# Patient Record
Sex: FEMALE | Race: WHITE | NOT HISPANIC OR LATINO | Employment: OTHER | URBAN - METROPOLITAN AREA
[De-identification: names, ages, dates, MRNs, and addresses within clinical notes are randomized per-mention and may not be internally consistent; named-entity substitution may affect disease eponyms.]

---

## 2017-01-24 ENCOUNTER — ALLSCRIPTS OFFICE VISIT (OUTPATIENT)
Dept: OTHER | Facility: OTHER | Age: 67
End: 2017-01-24

## 2017-07-19 ENCOUNTER — ALLSCRIPTS OFFICE VISIT (OUTPATIENT)
Dept: OTHER | Facility: OTHER | Age: 67
End: 2017-07-19

## 2017-07-19 DIAGNOSIS — Z13.820 ENCOUNTER FOR SCREENING FOR OSTEOPOROSIS: ICD-10-CM

## 2017-07-19 DIAGNOSIS — J45.909 UNCOMPLICATED ASTHMA: ICD-10-CM

## 2017-07-24 ENCOUNTER — OFFICE VISIT (OUTPATIENT)
Dept: URGENT CARE | Facility: MEDICAL CENTER | Age: 67
End: 2017-07-24
Payer: MEDICARE

## 2017-07-24 PROCEDURE — G0463 HOSPITAL OUTPT CLINIC VISIT: HCPCS

## 2017-07-24 PROCEDURE — 99203 OFFICE O/P NEW LOW 30 MIN: CPT

## 2017-08-02 ENCOUNTER — HOSPITAL ENCOUNTER (OUTPATIENT)
Dept: BONE DENSITY | Facility: MEDICAL CENTER | Age: 67
Discharge: HOME | End: 2017-08-02
Payer: MEDICARE

## 2017-08-02 DIAGNOSIS — Z13.820 ENCOUNTER FOR SCREENING FOR OSTEOPOROSIS: ICD-10-CM

## 2017-08-02 PROCEDURE — 77080 DXA BONE DENSITY AXIAL: CPT

## 2017-08-08 ENCOUNTER — GENERIC CONVERSION - ENCOUNTER (OUTPATIENT)
Dept: OTHER | Facility: OTHER | Age: 67
End: 2017-08-08

## 2017-08-12 LAB
A/G RATIO (HISTORICAL): 1.8 (CALC) (ref 1–2.5)
ALBUMIN SERPL BCP-MCNC: 4.3 G/DL (ref 3.6–5.1)
ALP SERPL-CCNC: 67 U/L (ref 33–130)
ALT SERPL W P-5'-P-CCNC: 16 U/L (ref 6–29)
AST SERPL W P-5'-P-CCNC: 20 U/L (ref 10–35)
BASOPHILS # BLD AUTO: 0.8 %
BASOPHILS # BLD AUTO: 71 CELLS/UL (ref 0–200)
BILIRUB SERPL-MCNC: 0.4 MG/DL (ref 0.2–1.2)
BUN SERPL-MCNC: 10 MG/DL (ref 7–25)
BUN/CREA RATIO (HISTORICAL): NORMAL (CALC) (ref 6–22)
CALCIUM SERPL-MCNC: 8.9 MG/DL (ref 8.6–10.4)
CHLORIDE SERPL-SCNC: 105 MMOL/L (ref 98–110)
CO2 SERPL-SCNC: 29 MMOL/L (ref 20–31)
CREAT SERPL-MCNC: 0.78 MG/DL (ref 0.5–0.99)
DEPRECATED RDW RBC AUTO: 12.8 % (ref 11–15)
EGFR AFRICAN AMERICAN (HISTORICAL): 92 ML/MIN/1.73M2
EGFR-AMERICAN CALC (HISTORICAL): 80 ML/MIN/1.73M2
EOSINOPHIL # BLD AUTO: 1.7 %
EOSINOPHIL # BLD AUTO: 151 CELLS/UL (ref 15–500)
GAMMA GLOBULIN (HISTORICAL): 2.4 G/DL (CALC) (ref 1.9–3.7)
GLUCOSE (HISTORICAL): 94 MG/DL (ref 65–99)
HCT VFR BLD AUTO: 45.2 % (ref 35–45)
HGB BLD-MCNC: 15.1 G/DL (ref 11.7–15.5)
LYMPHOCYTES # BLD AUTO: 2278 CELLS/UL (ref 850–3900)
LYMPHOCYTES # BLD AUTO: 25.6 %
MCH RBC QN AUTO: 29.5 PG (ref 27–33)
MCHC RBC AUTO-ENTMCNC: 33.4 G/DL (ref 32–36)
MCV RBC AUTO: 88.3 FL (ref 80–100)
MONOCYTES # BLD AUTO: 703 CELLS/UL (ref 200–950)
MONOCYTES (HISTORICAL): 7.9 %
NEUTROPHILS # BLD AUTO: 5696 CELLS/UL (ref 1500–7800)
NEUTROPHILS # BLD AUTO: 64 %
PLATELET # BLD AUTO: 228 THOUSAND/UL (ref 140–400)
PMV BLD AUTO: 11 FL (ref 7.5–12.5)
POTASSIUM SERPL-SCNC: 4.7 MMOL/L (ref 3.5–5.3)
RBC # BLD AUTO: 5.12 MILLION/UL (ref 3.8–5.1)
SODIUM SERPL-SCNC: 138 MMOL/L (ref 135–146)
TOTAL PROTEIN (HISTORICAL): 6.7 G/DL (ref 6.1–8.1)
WBC # BLD AUTO: 8.9 THOUSAND/UL (ref 3.8–10.8)

## 2017-08-15 ENCOUNTER — GENERIC CONVERSION - ENCOUNTER (OUTPATIENT)
Dept: OTHER | Facility: OTHER | Age: 67
End: 2017-08-15

## 2017-09-11 ENCOUNTER — ALLSCRIPTS OFFICE VISIT (OUTPATIENT)
Dept: OTHER | Facility: OTHER | Age: 67
End: 2017-09-11

## 2017-10-23 ENCOUNTER — LAB REQUISITION (OUTPATIENT)
Dept: LAB | Facility: HOSPITAL | Age: 67
End: 2017-10-23
Payer: MEDICARE

## 2017-10-23 ENCOUNTER — ALLSCRIPTS OFFICE VISIT (OUTPATIENT)
Dept: OTHER | Facility: OTHER | Age: 67
End: 2017-10-23

## 2017-10-23 DIAGNOSIS — Z12.4 ENCOUNTER FOR SCREENING FOR MALIGNANT NEOPLASM OF CERVIX: ICD-10-CM

## 2017-10-23 DIAGNOSIS — Z12.31 ENCOUNTER FOR SCREENING MAMMOGRAM FOR MALIGNANT NEOPLASM OF BREAST: ICD-10-CM

## 2017-10-23 PROCEDURE — G0145 SCR C/V CYTO,THINLAYER,RESCR: HCPCS | Performed by: OBSTETRICS & GYNECOLOGY

## 2017-10-24 NOTE — PROGRESS NOTES
Assessment  1  Encounter for screening mammogram for breast cancer (V76 12) (Z12 31)  2  Encounter for cervical Pap smear with pelvic exam (V76 2,V72 31) (Z01 419)1   3  Encounter for preventive health examination (V70 0) (Z00 00)1      1 Amended By: Payton Galarza; Oct 23 2017 9:37 AM EST    Plan  Cervical cancer screening    · (1) THIN PREP PAP WITH IMAGING; Status:Active - Retrospective By Protocol  Authorization; Requested F:[id number];   Perform:[address]; BMJ:[id number]; Last Updated   Katarina Ricketts; 10/23/2017 9:30:08 AM;Ordered; For:Cervical cancer screening;   Ordered By:Riedel, C William;  PAP Maturation index required? : No  LMP: : menopause  Reflex HPV? : if ASCUS  Encounter for screening mammogram for breast cancer    · MAMMO SCREENING BILATERAL W 3D & CAD; Status:Hold For - Scheduling; Requested  BKY:[id number];   Perform:[facility]; [phone]; Ordered; For:Encounter for screening   mammogram for breast cancer; Ordered By:Riedel, C William;    Discussion/Summary  health maintenance visit healthy adult female Currently, she eats a healthy diet  the risks and benefits of cervical cancer screening were discussed cervical cancer screening is current Pap test was done today Pap test with reflex HPV testing was done today Breast cancer screening: the risks and benefits of breast cancer screening were discussed, self breast exam technique was taught, monthly self breast exam was advised, mammogram is current and mammogram has been ordered  Colorectal cancer screening: the risks and benefits of colorectal cancer screening were discussed and colorectal cancer screening is current  Osteoporosis screening: the risks and benefits of osteoporosis screening were discussed and bone mineral density testing is current  Patient discussion: discussed with the patient, 30 minute visit, greater than half of the time was spent on counseling       As above patient here for annual exam Pap pelvic and breast exam  Her exam today was normal screening mammography ordered  DEXA bone scan had been previously ordered by her PCP  She is scheduled to follow up with her PCP soon to order any additional screening laboratory studies as indicated  Patient is otherwise in good health and has recently retired from her position as a  for over 28 years  Recommend follow-up in 2 years and lyle Peguero Chief Complaint  PT IS HERE FOR HER ANNUAL EXAM, PT NEED A MAMMO SCRIP AND PT WOULD LIKE TO HAVE SOME BLOOD WORK SCRIPT TOO, PT HAD NO COMPLAINTS  History of Present Illness  HPI: 19-year-old female nulliparous here for annual exam Pap pelvic and breast exam  Patient does have occasional hot flashes and night sweats  Is not currently on any hormone replacement therapy  Otherwise she has no gyn complaints or concerns at this time  GYN HM, Adult Female [facility]: The patient is being seen for a health maintenance and gynecology evaluation  The last health maintenance visit was 2016 year(s) ago  General Health: The patient's health since the last visit is described as good  She has regular dental visits  -- She denies vision problems  -- She denies hearing loss  Immunizations status: up to date  Lifestyle:  She consumes a diverse and healthy diet  -- She does not have any weight concerns  -- She exercises regularly  -- She does not use tobacco -- She denies alcohol use  -- She denies drug use  Reproductive health: the patient is postmenopausal    Screening: cancer screening reviewed and current  Review of Systems    Constitutional: No fever, no chills, feels well, no tiredness, no recent weight gain or loss  ENT: no ear ache, no loss of hearing, no nosebleeds or nasal discharge, no sore throat or hoarseness  Cardiovascular: no complaints of slow or fast heart rate, no chest pain, no palpitations, no leg claudication or lower extremity edema     Respiratory: no complaints of shortness of breath, no wheezing, no dyspnea on exertion, no orthopnea or PND  Breasts: no complaints of breast pain, breast lump or nipple discharge  Gastrointestinal: no complaints of abdominal pain, no constipation, no nausea or diarrhea, no vomiting, no bloody stools  Genitourinary: no complaints of dysuria, no incontinence, no pelvic pain, no dysmenorrhea, no vaginal discharge or abnormal vaginal bleeding  Musculoskeletal: no complaints of arthralgia, no myalgia, no joint swelling or stiffness, no limb pain or swelling  Integumentary: no complaints of skin rash or lesion, no itching or dry skin, no skin wounds  Neurological: no complaints of headache, no confusion, no numbness or tingling, no dizziness or fainting  Active Problems  1  Acute maxillary sinusitis (461 0) (J01 00)  2  Allergic rhinitis (477 9) (J30 9)  3  Asthma (493 90) (J45 909)  4  Cellulitis of left lower leg (682 6) (L03 116)  5  Depression screening (V79 0) (Z13 89)  6  Dysfunction of left eustachian tube (381 81) (H69 82)  7  Edema (782 3) (R60 9)  8  Encounter for cervical Pap smear with pelvic exam (V76 2,V72 31) (Z01 419)  9  Encounter for screening colonoscopy (V76 51) (Z12 11)  10  Encounter for screening mammogram for breast cancer (V76 12) (Z12 31)  11  Fatigue (780 79) (R53 83)  12  History of dysthymic disorder (V11 9) (Z86 59)  13  Hyperlipidemia (272 4) (E78 5)  14  Nocturnal leg cramps (327 52) (G47 62)  15  Osteoporosis screening (V82 81) (Z13 820)  16  Screening for other and unspecified genitourinary condition (V81 6) (Z13 89)  17  Special screening for other neurological conditions (V80 09) (Z13 89)  18   Visit for routine gyn exam (V72 31) (Z01 419)    Surgical History   · History of Breast Surgery Lumpectomy   · History of Oral Surgery Tooth Extraction Bearden Tooth   · History of Tonsillectomy    Family History  Mother    · Family history of hypertension (V17 49) (Z82 49)   · Family history of malignant neoplasm of uterus (V16 49) (Z80 49)  Father    · Family history of cardiac disorder (V17 49) (Z82 49)  Brother    · Family history of heart attack (V17 3) (Z82 49)  Grandmother    · Family history of diabetes mellitus (V18 0) (Z83 3)    Social History   · Always uses seat belt   · Caffeine use (V49 89) (F15 90)   · Former smoker (V15 82) (W15 609)   · Single   · Social alcohol use (Z78 9)    Current Meds  1  LevoFLOXacin 500 MG Oral Tablet; Take 1 tablet daily; Therapy: 36Jkk8694 to (Last Rx:91Dph8401)  Requested for: 97Ays9625 Ordered  2  PredniSONE 10 MG Oral Tablet; Take 6 pills x 3 days, 4 pills x 3 days, 2 pills x 3   days, then 1 pill x 3 days; Therapy: 18Zko3308 to (Evaluate:00Qqh9529)  Requested for: 79Isr3683; Last   Rx:30Kqd8655 Ordered  3  Ventolin  (90 Base) MCG/ACT Inhalation Aerosol Solution; INHALE 1 TO 2   PUFFS EVERY 4 TO 6 HOURS AS NEEDED; Therapy: 80GSY9749 to (Last Rx:45Zyz9709)  Requested for: 99LWW3558 Ordered    Allergies  1  Penicillins  2  Animal dander - Cats  3  Animal dander - Dogs  4  Grass  5  Mold  6  Ragweed  7  Soy  8   Wasp    Vitals   Recorded: 61OGL9615 22:29UL   Systolic 592, LUE, Sitting   Diastolic 65, LUE, Sitting   Height 5 ft 4 in   Weight 126 lb    BMI Calculated 21 63   BSA Calculated 1 61   LMP MENOPAUSE     Signatures   Electronically signed by : Jose David Salazar DO; Oct 23 2017  9:37AM EST                       (Author)

## 2017-10-31 LAB
LAB AP GYN PRIMARY INTERPRETATION: NORMAL
Lab: NORMAL

## 2017-12-01 ENCOUNTER — ALLSCRIPTS OFFICE VISIT (OUTPATIENT)
Dept: OTHER | Facility: OTHER | Age: 67
End: 2017-12-01

## 2017-12-01 DIAGNOSIS — J06.9 ACUTE UPPER RESPIRATORY INFECTION: ICD-10-CM

## 2017-12-01 DIAGNOSIS — J45.909 UNCOMPLICATED ASTHMA: ICD-10-CM

## 2017-12-01 DIAGNOSIS — E55.9 VITAMIN D DEFICIENCY: ICD-10-CM

## 2017-12-04 ENCOUNTER — APPOINTMENT (OUTPATIENT)
Dept: LAB | Facility: HOSPITAL | Age: 67
End: 2017-12-04
Payer: MEDICARE

## 2017-12-04 ENCOUNTER — HOSPITAL ENCOUNTER (OUTPATIENT)
Dept: RADIOLOGY | Facility: HOSPITAL | Age: 67
Discharge: HOME | End: 2017-12-04
Payer: MEDICARE

## 2017-12-04 DIAGNOSIS — J45.909 UNCOMPLICATED ASTHMA: ICD-10-CM

## 2017-12-04 DIAGNOSIS — J06.9 ACUTE UPPER RESPIRATORY INFECTION: ICD-10-CM

## 2017-12-04 DIAGNOSIS — E55.9 VITAMIN D DEFICIENCY: ICD-10-CM

## 2017-12-04 LAB — 25(OH)D3 SERPL-MCNC: 22.4 NG/ML (ref 30–100)

## 2017-12-04 PROCEDURE — 71020 HB CHEST X-RAY 2VW FRONTAL&LATL: CPT

## 2017-12-04 PROCEDURE — 82306 VITAMIN D 25 HYDROXY: CPT

## 2017-12-04 PROCEDURE — 36415 COLL VENOUS BLD VENIPUNCTURE: CPT

## 2017-12-05 ENCOUNTER — GENERIC CONVERSION - ENCOUNTER (OUTPATIENT)
Dept: OTHER | Facility: OTHER | Age: 67
End: 2017-12-05

## 2017-12-08 ENCOUNTER — GENERIC CONVERSION - ENCOUNTER (OUTPATIENT)
Dept: OTHER | Facility: OTHER | Age: 67
End: 2017-12-08

## 2017-12-08 PROCEDURE — 88305 TISSUE EXAM BY PATHOLOGIST: CPT | Performed by: FAMILY MEDICINE

## 2017-12-11 ENCOUNTER — LAB REQUISITION (OUTPATIENT)
Dept: LAB | Facility: HOSPITAL | Age: 67
End: 2017-12-11
Payer: MEDICARE

## 2017-12-11 DIAGNOSIS — L98.9 DISORDER OF SKIN OR SUBCUTANEOUS TISSUE: ICD-10-CM

## 2017-12-12 ENCOUNTER — HOSPITAL ENCOUNTER (OUTPATIENT)
Dept: MAMMOGRAPHY | Facility: MEDICAL CENTER | Age: 67
Discharge: HOME | End: 2017-12-12
Payer: MEDICARE

## 2017-12-12 DIAGNOSIS — Z12.31 ENCOUNTER FOR SCREENING MAMMOGRAM FOR MALIGNANT NEOPLASM OF BREAST: ICD-10-CM

## 2017-12-12 PROCEDURE — 77063 BREAST TOMOSYNTHESIS BI: CPT

## 2017-12-12 PROCEDURE — G0202 SCR MAMMO BI INCL CAD: HCPCS

## 2017-12-19 ENCOUNTER — GENERIC CONVERSION - ENCOUNTER (OUTPATIENT)
Dept: OTHER | Facility: OTHER | Age: 67
End: 2017-12-19

## 2018-01-05 ENCOUNTER — GENERIC CONVERSION - ENCOUNTER (OUTPATIENT)
Dept: OTHER | Facility: OTHER | Age: 68
End: 2018-01-05

## 2018-01-12 VITALS
HEIGHT: 64 IN | SYSTOLIC BLOOD PRESSURE: 120 MMHG | DIASTOLIC BLOOD PRESSURE: 80 MMHG | WEIGHT: 124.13 LBS | BODY MASS INDEX: 21.19 KG/M2

## 2018-01-12 NOTE — RESULT NOTES
Verified Results  (1) CBC/PLT/DIFF 80WMP5757 11:26AM Tamie Dee Dee   REPORT COMMENT:  FASTING:YES     Test Name Result Flag Reference   WHITE BLOOD CELL COUNT 8 9 Thousand/uL  3 8-10 8   RED BLOOD CELL COUNT 5 12 Million/uL H 3 80-5 10   HEMOGLOBIN 15 1 g/dL  11 7-15 5   HEMATOCRIT 45 2 % H 35 0-45 0   MCV 88 3 fL  80 0-100 0   MCH 29 5 pg  27 0-33 0   MCHC 33 4 g/dL  32 0-36 0   RDW 12 8 %  11 0-15 0   PLATELET COUNT 794 Thousand/uL  140-400   ABSOLUTE NEUTROPHILS 5696 cells/uL  5431-1498   ABSOLUTE LYMPHOCYTES 2278 cells/uL  850-3900   ABSOLUTE MONOCYTES 703 cells/uL  200-950   ABSOLUTE EOSINOPHILS 151 cells/uL     ABSOLUTE BASOPHILS 71 cells/uL  0-200   NEUTROPHILS 64 %     LYMPHOCYTES 25 6 %     MONOCYTES 7 9 %     EOSINOPHILS 1 7 %     BASOPHILS 0 8 %     MPV 11 0 fL  7 5-12 5     (1) COMPREHENSIVE METABOLIC PANEL 13UWJ7975 22:34WZ Tamie Dee Dee     Test Name Result Flag Reference   GLUCOSE 94 mg/dL  65-99   Fasting reference interval   UREA NITROGEN (BUN) 10 mg/dL  7-25   CREATININE 0 78 mg/dL  0 50-0 99   For patients >52years of age, the reference limit  for Creatinine is approximately 13% higher for people  identified as -American  eGFR NON-AFR   AMERICAN 80 mL/min/1 73m2  > OR = 60   eGFR AFRICAN AMERICAN 92 mL/min/1 73m2  > OR = 60   BUN/CREATININE RATIO   3-20   NOT APPLICABLE (calc)   SODIUM 138 mmol/L  135-146   POTASSIUM 4 7 mmol/L  3 5-5 3   CHLORIDE 105 mmol/L     CARBON DIOXIDE 29 mmol/L  20-31   CALCIUM 8 9 mg/dL  8 6-10 4   PROTEIN, TOTAL 6 7 g/dL  6 1-8 1   ALBUMIN 4 3 g/dL  3 6-5 1   GLOBULIN 2 4 g/dL (calc)  1 9-3 7   ALBUMIN/GLOBULIN RATIO 1 8 (calc)  1 0-2 5   BILIRUBIN, TOTAL 0 4 mg/dL  0 2-1 2   ALKALINE PHOSPHATASE 67 U/L     AST 20 U/L  10-35   ALT 16 U/L  6-29       Signatures   Electronically signed by : ABBY Sánchez ; Aug 15 2017  1:22PM EST                       (Author)

## 2018-01-13 VITALS
DIASTOLIC BLOOD PRESSURE: 70 MMHG | WEIGHT: 123 LBS | HEIGHT: 65 IN | BODY MASS INDEX: 20.49 KG/M2 | SYSTOLIC BLOOD PRESSURE: 122 MMHG

## 2018-01-13 VITALS
HEIGHT: 64 IN | SYSTOLIC BLOOD PRESSURE: 112 MMHG | DIASTOLIC BLOOD PRESSURE: 65 MMHG | WEIGHT: 126 LBS | BODY MASS INDEX: 21.51 KG/M2

## 2018-01-14 VITALS
BODY MASS INDEX: 21.25 KG/M2 | SYSTOLIC BLOOD PRESSURE: 110 MMHG | TEMPERATURE: 98.5 F | WEIGHT: 124.5 LBS | HEIGHT: 64 IN | DIASTOLIC BLOOD PRESSURE: 70 MMHG

## 2018-01-15 NOTE — RESULT NOTES
Verified Results  * DXA BONE DENSITY SPINE HIP AND PELVIS 02Wtu6045 07:47AM [facility] Order Number: [id number]    - Patient Instructions: To schedule this appointment, please contact Central Scheduling at [phone]  Test Name Result Flag Reference   DXA BONE DENSITY SPINE HIP AND PELVIS (Report)     CENTRAL DXA SCAN     CLINICAL HISTORY:  74year old post-menopausal  female risk factors include estrogen deficiency  TECHNIQUE: Bone densitometry was performed using a Blitsy's W bone densitometer  Regions of interest appear properly placed  There are no obvious fractures or other confounding variables which could limit the study  Degenerative or    osteoarthritic changes present in the spine image which may falsely alter the true bone density  A Lateral thoracic lumbar spine x-ray may be of interest in this patient  COMPARISON: Baseline     RESULTS:    LUMBAR SPINE: L1-L4:   BMD 1 086 gm/cm2   T-score normal, 0 4   Z-score +2 2     LEFT TOTAL HIP:   BMD 0 960 gm/cm2   T-score normal, 0 1   Z-score +1 4     LEFT FEMORAL NECK:   BMD 0 918 gm/cm2   T-score normal, 0 6   Z-score +2 2             IMPRESSION:   1  Based on the [facility] classification, this study is normal and the patient is considered at low risk for fracture  2  A daily intake of calcium of at least 1200 mg and vitamin D, 800-1000 IU, as well as weight bearing and muscle strengthening exercise, fall prevention and avoidance of tobacco and excessive alcohol intake as basic preventive measures are recommended  3  Repeat DXA scan on the same equipment in 24-36 months as clinically indicated  The 10 year risk of hip fracture is 0 1%, with the 10 year risk of major osteoporotic fracture being 5 5%, as calculated by the [facility] fracture risk assessment tool (FRAX)   The current NOF guidelines recommend treating patients with FRAX 10 year risk score   of >3% for hip fracture and >20% for major osteoporotic fracture  WHO CLASSIFICATION:   Normal (a T-score of -1 0 or higher)   Low bone mineral density (a T-score of less than -1 0 but higher than -2 5)   Osteoporosis (a T-score of -2 5 or less)   Severe osteoporosis (a T-score of -2 5 or less with a fragility fracture)      Thank you for allowing us to participate in your patient care  The expanded DXA report will no longer be arriving in your mail  If you desire to view the full report please contact [address] [place] [address] or access the PACS system         Workstation performed: [id number]     Signed by:   Milton Connelly MD   8/7/17       Signatures   Electronically signed by : ABBY Garcia ; Aug  8 2017  7:49AM EST                       (Author)

## 2018-01-23 VITALS
BODY MASS INDEX: 21.68 KG/M2 | TEMPERATURE: 99.2 F | DIASTOLIC BLOOD PRESSURE: 78 MMHG | HEIGHT: 64 IN | SYSTOLIC BLOOD PRESSURE: 118 MMHG | WEIGHT: 127 LBS

## 2018-01-23 NOTE — RESULT NOTES
Verified Results  * XR CHEST PA & LATERAL [id number] 04:16PM Nataly Beltran Order Number: [id number]     Test Name Result Flag Reference   XR CHEST PA & LATERAL (Report)     CHEST      INDICATION: Chronic cough, uncomplicated asthma  COMPARISON: 5/11/2017     VIEWS: Frontal and lateral projections     IMAGES: 2     FINDINGS:        Cardiomediastinal silhouette appears unremarkable  The lungs are clear  Mild right apical pleural thickening  No pneumothorax or pleural effusion  Degenerative changes of the spine  IMPRESSION:     No active pulmonary disease         Workstation performed: [id number]     Signed by:   Alex Loera DO   12/7/17       Signatures   Electronically signed by : ABBY Puri ; Dec  8 2017  8:09AM EST                       (Author)

## 2018-01-23 NOTE — RESULT NOTES
Verified Results  (1) TISSUE EXAM 47YTQ1979 10:13AM Fredy Alvarez     Test Name Result Flag Reference   LAB AP CASE REPORT (Report)     Surgical Pathology Report             Case: [id number]                   Authorizing Provider: Peter Smith MD   Collected:      12/08/2017           Pathologist:      Susie Arriaza MD      Received:      12/12/2017 1024        Specimen:  Skin, Other, Lower back   LAB AP FINAL DIAGNOSIS      A  Skin, Lower back, shave biopsy:  - Seborrheic keratosis, pigmented and hyperkeratotic  Interpretation performed at [place] [address]  Electronically signed by Susie Arriaza MD on 12/15/2017 at 10:13 AM   LAB AP SURGICAL ADDITIONAL INFORMATION (Report)     All controls performed with the immunohistochemical stains reported above   reacted appropriately  These tests were developed and their performance   characteristics determined by [facility] or   [facility]  They may not be cleared or approved by the [place]  Food and Drug Administration  The FDA has determined that such clearance   or approval is not necessary  These tests are used for clinical purposes  They should not be regarded as investigational or for research  This   laboratory has been approved by [place] 88, designated as a high-complexity   laboratory and is qualified to perform these tests  LAB AP GROSS DESCRIPTION (Report)     A  The specimen is received in formalin, labeled with the patient's name   and hospital number, and is designated tissue specimen from lower back  The specimen consists of a tan superficial shave of skin measuring 0 7 x   0 4 x 0 1 cm  The skin surface appears keratotic  The margin of   resection is inked green  The skin surface is inked red  The specimen is   bisected lengthwise  Entirely submitted  One cassette      Note: The estimated total formalin fixation time based upon information   provided by the submitting clinician and the standard processing schedule   is over 72 hours   [facility]       Signatures   Electronically signed by : ABBY Esparza ; Dec 19 2017  7:41AM EST                       (Author)

## 2018-01-23 NOTE — PROGRESS NOTES
Assessment    1  Asthma (493 90) (J45 909)   2  Acute upper respiratory infection (465 9) (J06 9)   3  Varicose veins with pain (454 8) (I83 819)   4  Changing skin lesion (709 9) (L98 9)   5  Vitamin D deficiency (268 9) (E55 9)   6  Depression (311) (F32 9)    Plan  Acute upper respiratory infection, Asthma    · Azithromycin 250 MG Oral Tablet; TAKE 2 TABLETS ON DAY 1 THEN TAKE 1  TABLET A DAY FOR 4 DAYS   · * XR CHEST PA & LATERAL; Status:Active; Requested for:55Agy2408; Asthma    · Flovent  MCG/ACT Inhalation Aerosol; INHALE 2 PUFFS TWICE DAILY  RINSE MOUTH AFTER USE   · PredniSONE 20 MG Oral Tablet; TAKE 2 TABLETS DAILY  Depression    · Escitalopram Oxalate 5 MG Oral Tablet; TAKE 1 TABLET DAILY  Health Maintenance    · EKG/ECG- POC; Status:Complete;   Done: 07FJX7467 11:56AM  Varicose veins with pain    · *1 -  [facility]-Management  *  Status: Active  Requested for:  31XIV1047  Care Summary provided  : Yes  Vitamin D deficiency    · (1) VITAMIN D 25-HYDROXY; Status:Active; Requested for:93Nnq1491;     Discussion/Summary    19-year-old woman with: Welcome to Medicare visit  Discussed various safety and health maintenance issues  Encouraged healthy diet like the Mediterranean diet, exercise, stress reduction ample sleep  Patient courage to follow up with her gyn  Patient received the flu shot  Impression: Welcome to Medicare Visit  Chief Complaint  Patient presents today for welcome to Medicare AWV  She requests an EKG and chest Xray referral       History of Present Illness  Patient is a 19-year-old woman who presents for a welcome to Medicare visit  Patient admits that she is physically active in eats a healthy diet patient also follows with her gynecologist    The patient is being seen for the welcome to medicare visit  Medicare Screening and Risk Factors   Hospitalizations: no previous hospitalizations  Medicare Screening Tests Risk Questions   Drug and Alcohol Use:  The patient is a former cigarette smoker  The patient reports rare alcohol use  She has never used illicit drugs  Diet and Physical Activity: Current diet includes well balanced meals, 3 servings of fruit per day, 3 servings of vegetables per day, 1 servings of meat per day, 2 servings of whole grains per day, 4 servings of dairy products per day and 2 cups of coffee per day  She exercises daily  Exercise: walking, strength training, YOGA  Mood Disorder and Cognitive Impairment Screening: She reports feeling down, depressed, or hopeless over the past two weeks  She reports feeling little interest or pleasure in doing things over the past two weeks  Cognitive impairment screening: denies difficulty learning/retaining new information, denies difficulty handling complex tasks, denies difficulty with reasoning, denies difficulty with spatial ability and orientation, denies difficulty with language and denies difficulty with behavior  Functional Ability/Level of Safety: She denies hearing difficulties  She does not use a hearing aid  Fall risk factors: The patient fell 0 times in the past 12 months  Home safety risk factors:  no unfamiliar surroundings, no loose rugs, no poor household lighting, no uneven floors, no household clutter, grab bars in the bathroom and handrails on the stairs  Advance Directives: Advance directives: no living will, no durable power of  for health care directives and no advance directives  Co-Managers and Medical Equipment/Suppliers: See Patient Care Team      Patient Care Team    Care Team Member Role Specialty Office Number   [phone]     Review of Systems    Constitutional: negative  Eyes: negative  ENT: negative  Cardiovascular: negative  Respiratory: cough, but as noted in HPI  Gastrointestinal: negative  Genitourinary: negative  Musculoskeletal: negative  Integumentary and Breasts: negative  Neurological: negative  Psychiatric: depression, but as noted in HPI  Endocrine: negative  Hematologic and Lymphatic: negative  Active Problems    1  Acute maxillary sinusitis (461 0) (J01 00)   2  Allergic rhinitis (477 9) (J30 9)   3  Asthma (493 90) (J45 909)   4  Cellulitis of left lower leg (682 6) (L03 116)   5  Cervical cancer screening (V76 2) (Z12 4)   6  Depression screening (V79 0) (Z13 89)   7  Dysfunction of left eustachian tube (381 81) (H69 82)   8  Edema (782 3) (R60 9)   9  Encounter for cervical Pap smear with pelvic exam (V76 2,V72 31) (Z01 419)   10  Encounter for screening colonoscopy (V76 51) (Z12 11)   11  Encounter for screening mammogram for breast cancer (V76 12) (Z12 31)   12  Fatigue (780 79) (R53 83)   13  History of dysthymic disorder (V11 9) (Z86 59)   14  Hyperlipidemia (272 4) (E78 5)   15  Nocturnal leg cramps (327 52) (G47 62)   16  Osteoporosis screening (V82 81) (Z13 820)   17  Screening for other and unspecified genitourinary condition (V81 6) (Z13 89)   18  Special screening for other neurological conditions (V80 09) (Z13 89)   19  Visit for routine gyn exam (V72 31) (Z01 419)    Past Medical History    1  History of Cervical cancer screening (V76 2) (Z12 4)    Surgical History    1  History of Breast Surgery Lumpectomy   2  History of Oral Surgery Tooth Extraction Cade Tooth   3  History of Tonsillectomy    Family History  Mother    1  Family history of hypertension (V17 49) (Z82 49)   2  Family history of malignant neoplasm of uterus (V16 49) (Z80 49)  Father    3  Family history of cardiac disorder (V17 49) (Z82 49)  Brother    4  Family history of heart attack (V17 3) (Z82 49)  Grandmother    5  Family history of diabetes mellitus (V18 0) (Z83 3)    Social History    · Always uses seat belt   · Caffeine use (V49 89) (F15 90)   · Former smoker (V15 82) (P43 077)   · Single   · Social alcohol use (Z78 9)    Current Meds   1   LevoFLOXacin 500 MG Oral Tablet; Take 1 tablet daily; Therapy: 20Ala9216 to (Last Rx:55Byq8378)  Requested for: 58Orj9278 Ordered   2  PredniSONE 10 MG Oral Tablet; Take 6 pills x 3 days, 4 pills x 3 days, 2 pills x 3   days, then 1 pill x 3 days; Therapy: 78Hvf3938 to (Evaluate:11Oct2017)  Requested for: 50Oqj3569; Last   Rx:47Lya0886 Ordered   3  Ventolin  (90 Base) MCG/ACT Inhalation Aerosol Solution; INHALE 1 TO 2 PUFFS   EVERY 4 TO 6 HOURS AS NEEDED; Therapy: 91SGW0146 to (Last Rx:91Ogh4217)  Requested for: 37AGW6412 Ordered    The medication list was reviewed and updated today  Allergies    1  Penicillins    2  Animal dander - Cats   3  Animal dander - Dogs   4  Grass   5  Mold   6  Ragweed   7  Soy   8  Wasp    Vitals  Signs   Recorded: 11BRS6131 11:03AM   Temperature: 99 2 F, Tympanic  Systolic: 588, RUE, Sitting  Diastolic: 78, RUE, Sitting  Height: 5 ft 4 in  Weight: 127 lb   BMI Calculated: 21 8  BSA Calculated: 1 61    Results/Data  EKG/ECG- POC 08LYC7780 11:56AM John Leiva     Test Name Result Flag Reference   EKG/ECG see scanned image         Future Appointments    Date/Time Provider Specialty Site   01/02/2018 08:30 AM ABBY Negron   [address] [facility] [address]     Signatures   Electronically signed by : ABBY Puri ; Dec  1 2017 12:33PM EST                       (Author)

## 2018-01-23 NOTE — RESULT NOTES
Verified Results  (1) VITAMIN D 25-HYDROXY 70BHK0278 04:18PM Adrianna Velazquez Order Number: [id number]     Test Name Result Flag Reference   VIT D 25-HYDROX 22 4 ng/mL L 30 0-100 0   This assay is a certified procedure of the CDC Vitamin D Standardization Certification Program (VDSCP)     Deficiency <20ng/ml   Insufficiency 20-30ng/ml   Sufficient  ng/ml     *Patients undergoing fluorescein dye angiography may retain small amounts of fluorescein in the body for 48-72 hours post procedure  Samples containing fluorescein can produce falsely elevated Vitamin D values  If the patient had this procedure, a specimen should be resubmitted post fluorescein clearance         Signatures   Electronically signed by : ABBY Be ; Dec  5 2017  9:43AM EST                       (Author)

## 2018-01-24 VITALS
HEART RATE: 94 BPM | BODY MASS INDEX: 22.11 KG/M2 | WEIGHT: 129.5 LBS | OXYGEN SATURATION: 99 % | DIASTOLIC BLOOD PRESSURE: 74 MMHG | SYSTOLIC BLOOD PRESSURE: 120 MMHG | TEMPERATURE: 97.6 F | HEIGHT: 64 IN

## 2018-01-24 VITALS
TEMPERATURE: 99.4 F | DIASTOLIC BLOOD PRESSURE: 66 MMHG | HEIGHT: 64 IN | WEIGHT: 127 LBS | BODY MASS INDEX: 21.68 KG/M2 | SYSTOLIC BLOOD PRESSURE: 118 MMHG

## 2018-02-12 ENCOUNTER — CONSULT (OUTPATIENT)
Dept: VASCULAR SURGERY | Facility: CLINIC | Age: 68
End: 2018-02-12
Payer: MEDICARE

## 2018-02-12 VITALS
BODY MASS INDEX: 21.56 KG/M2 | RESPIRATION RATE: 20 BRPM | HEART RATE: 77 BPM | HEIGHT: 65 IN | DIASTOLIC BLOOD PRESSURE: 64 MMHG | SYSTOLIC BLOOD PRESSURE: 118 MMHG | WEIGHT: 129.4 LBS

## 2018-02-12 DIAGNOSIS — E78.5 HYPERLIPIDEMIA, UNSPECIFIED HYPERLIPIDEMIA TYPE: ICD-10-CM

## 2018-02-12 DIAGNOSIS — E55.9 VITAMIN D DEFICIENCY: ICD-10-CM

## 2018-02-12 DIAGNOSIS — I83.819 VARICOSE VEINS WITH PAIN: Primary | ICD-10-CM

## 2018-02-12 PROBLEM — J92.9 PLEURAL THICKENING: Status: ACTIVE | Noted: 2018-01-05

## 2018-02-12 PROBLEM — L98.9 CHANGING SKIN LESION: Status: ACTIVE | Noted: 2017-12-01

## 2018-02-12 PROBLEM — F32.A DEPRESSION: Status: ACTIVE | Noted: 2017-12-01

## 2018-02-12 PROCEDURE — 99204 OFFICE O/P NEW MOD 45 MIN: CPT | Performed by: PHYSICIAN ASSISTANT

## 2018-02-12 RX ORDER — EPINEPHRINE 0.3 MG/.3ML
0.3 INJECTION SUBCUTANEOUS
COMMUNITY
End: 2020-08-19 | Stop reason: SDUPTHER

## 2018-02-12 RX ORDER — DEXAMETHASONE 4 MG/1
2 TABLET ORAL 2 TIMES DAILY
Refills: 3 | COMMUNITY
Start: 2017-12-01 | End: 2018-05-05

## 2018-02-12 RX ORDER — LORATADINE 10 MG/1
10 TABLET ORAL
COMMUNITY
Start: 2015-07-31 | End: 2018-05-05

## 2018-02-12 RX ORDER — ALBUTEROL SULFATE 90 UG/1
1 AEROSOL, METERED RESPIRATORY (INHALATION) AS NEEDED
COMMUNITY
End: 2019-04-12 | Stop reason: SDUPTHER

## 2018-02-12 NOTE — ASSESSMENT & PLAN NOTE
Symptomatic bilateral lower extremity varicose veins, R>L  --compression stockings  --intermittent bilateral lower extremity elevation  --skin moisturization  --low-sodium diet  --continued aerobic activity  --return to office in 3 months with surgeon with [facility] for reassessment of symptoms and to discuss surgical options  --contact the office in the interim with any questions, concerns or new symptoms
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
## 2018-02-12 NOTE — PATIENT INSTRUCTIONS
Varicose Veins   AMBULATORY CARE:   Varicose veins  are veins that become large, twisted, and swollen  They are common on the back of the calves, knees, and thighs  Varicose veins are caused by valves in your veins that do not work properly  This causes blood to collect and increase pressure in the veins of your legs  The increased pressure causes your veins to stretch, get larger, swell, and twist        Common symptoms include the following: Your symptoms may be worse after you stand or sit for long periods of time  You may have any of the following:  · Blue, purple, or bulging veins in your legs     · Pain, swelling, or muscle cramps in your legs    · Feeling of fatigue or heaviness in your legs    · Cramping in your legs  Seek care immediately if:   · You have a wound that does not heal or is infected  · You have an injury that has broken your skin and caused your varicose veins to bleed  · Your leg is swollen and hard  · You notice that your legs or feet are turning blue or black  · Your leg feels warm, tender, and painful  It may look swollen and red  Contact your healthcare provider if:   · You have pain in your leg that does not go away or gets worse  · You notice sudden large bruising on your legs  · You have a rash on your leg  · Your symptoms keep you from doing your daily activities  · You have questions or concerns about your condition or care  Treatment of varicose veins  aims to decrease symptoms, improve appearance, and prevent further problems  Treatment will depend on which veins are affected and how severe your condition is  You may need procedures to treat or remove your varicose veins  For example, your healthcare provider may inject a solution or use a laser to close the varicose veins  Surgery to remove long veins may also be done  Ask your healthcare provider for more information about procedures used to treat varicose veins    Manage varicose veins:   · Do not sit or stand for long periods of time  This can cause the blood to collect in your legs and make your symptoms worse  Bend or rotate your ankles several times every hour  Walk around for a few minutes every hour to get blood moving in your legs  · Do not cross your legs when you sit  This decreases blood flow to your feet and can make your symptoms worse  · Do not wear tight clothing or shoes  Do not wear high-heeled shoes  Do not wear clothes that are tight around the waist or knees  · Maintain a healthy weight  Being overweight or obese can make your varicose veins worse  Ask your healthcare provider how much you should weigh  Ask him or her to help you create a weight loss plan if you are overweight  · Wear pressure stockings as directed  The stockings are tight and put pressure on your legs  They improve blood flow and help prevent clots  · Elevate your legs  Keep them above the level of your heart for 15 to 30 minutes several times a day  You can also prop the end of your bed up slightly to elevate your legs while you sleep  This will help blood to flow back to your heart  · Get regular exercise  Talk to your healthcare provider about the best exercise plan for you  Exercise can improve blood flow to your legs and feet  Follow up with your healthcare provider as directed:  Write down your questions so you remember to ask them during your visits  © 2017 [address] [employer] Information is for End User's use only and may not be sold, redistributed or otherwise used for commercial purposes  All illustrations and images included in CareNotes® are the copyrighted property of A D A M , Inc  or [employer]  The above information is an  only  It is not intended as medical advice for individual conditions or treatments   Talk to your doctor, nurse or pharmacist before following any medical regimen to see if it is safe and effective for you     --compression stockings, intermittent bilateral lower extremity elevation, low-sodium diet, skin moisturization and continued aerobic activity  --return to office in 3 months with surgeon with [facility] to reassess symptoms discuss surgical options  --contact the office in the interim with any questions, concerns or new symptoms

## 2018-03-16 ENCOUNTER — OFFICE VISIT (OUTPATIENT)
Dept: FAMILY MEDICINE CLINIC | Facility: CLINIC | Age: 68
End: 2018-03-16
Payer: MEDICARE

## 2018-03-16 VITALS
WEIGHT: 125 LBS | SYSTOLIC BLOOD PRESSURE: 130 MMHG | OXYGEN SATURATION: 96 % | BODY MASS INDEX: 20.83 KG/M2 | HEART RATE: 93 BPM | HEIGHT: 65 IN | DIASTOLIC BLOOD PRESSURE: 86 MMHG | TEMPERATURE: 98.7 F

## 2018-03-16 DIAGNOSIS — J01.81 OTHER ACUTE RECURRENT SINUSITIS: Primary | ICD-10-CM

## 2018-03-16 PROCEDURE — 99213 OFFICE O/P EST LOW 20 MIN: CPT | Performed by: FAMILY MEDICINE

## 2018-03-16 RX ORDER — AZITHROMYCIN 250 MG/1
TABLET, FILM COATED ORAL
Qty: 6 TABLET | Refills: 0 | Status: SHIPPED | OUTPATIENT
Start: 2018-03-16 | End: 2018-03-21

## 2018-03-16 RX ORDER — FLUTICASONE PROPIONATE 50 MCG
1 SPRAY, SUSPENSION (ML) NASAL DAILY
Qty: 16 G | Refills: 0 | Status: SHIPPED | OUTPATIENT
Start: 2018-03-16 | End: 2020-08-19

## 2018-03-16 NOTE — PROGRESS NOTES
Assessment/Plan:    72-year-old woman with:  Acute sinusitis  Discussed supportive care return parameters  Will give Z-Jayden and Flonase  Advised patient to call back if symptoms are not improving or if they worsen  No problem-specific Assessment & Plan notes found for this encounter  There are no diagnoses linked to this encounter  Subjective:   Chief Complaint   Patient presents with    Cold Like Symptoms    Fatigue    Nose Bleed      Patient ID: Kylie Liu is a 79 y o  female  Patient is a 72-year-old woman who presents complaining of 1 week of cough congestion and sinus pressure  Patient had a nose bleed as well  No fevers chills nausea vomiting  Tolerating p o  intake  The following portions of the patient's history were reviewed and updated as appropriate: allergies, current medications, past family history, past medical history, past social history, past surgical history and problem list     Review of Systems   Constitutional: Negative  HENT: Positive for congestion, sinus pressure and sore throat  Eyes: Negative  Respiratory: Positive for cough  Cardiovascular: Negative  Gastrointestinal: Negative  Endocrine: Negative  Genitourinary: Negative  Musculoskeletal: Negative  Allergic/Immunologic: Negative  Neurological: Negative  Hematological: Negative  Psychiatric/Behavioral: Negative  All other systems reviewed and are negative  Objective:      /86 (BP Location: Left arm, Patient Position: Sitting, Cuff Size: Standard)   Pulse 93   Temp 98 7 °F (37 1 °C) (Tympanic)   Ht 5' 5" (1 651 m)   Wt 56 7 kg (125 lb)   SpO2 96%   Breastfeeding? No   BMI 20 80 kg/m²          Physical Exam   Constitutional: She is oriented to person, place, and time  She appears well-developed and well-nourished  HENT:   Head: Atraumatic     Right Ear: External ear normal    Left Ear: External ear normal    Eyes: Conjunctivae and EOM are normal  Pupils are equal, round, and reactive to light  Neck: Normal range of motion  Cardiovascular: Normal rate, regular rhythm and normal heart sounds  Pulmonary/Chest: Effort normal and breath sounds normal  No respiratory distress  Abdominal: Soft  Bowel sounds are normal  She exhibits no distension  There is no tenderness  There is no rebound and no guarding  Musculoskeletal: Normal range of motion  Neurological: She is alert and oriented to person, place, and time  No cranial nerve deficit  Skin: Skin is warm and dry  Psychiatric: She has a normal mood and affect   Her behavior is normal  Judgment and thought content normal

## 2018-04-02 ENCOUNTER — OFFICE VISIT (OUTPATIENT)
Dept: OBGYN CLINIC | Facility: CLINIC | Age: 68
End: 2018-04-02
Payer: MEDICARE

## 2018-04-02 VITALS
SYSTOLIC BLOOD PRESSURE: 118 MMHG | BODY MASS INDEX: 21.39 KG/M2 | DIASTOLIC BLOOD PRESSURE: 80 MMHG | WEIGHT: 128.4 LBS | HEIGHT: 65 IN

## 2018-04-02 DIAGNOSIS — N76.0 BV (BACTERIAL VAGINOSIS): Primary | ICD-10-CM

## 2018-04-02 DIAGNOSIS — N89.8 DISCHARGE FROM THE VAGINA: ICD-10-CM

## 2018-04-02 DIAGNOSIS — B96.89 BV (BACTERIAL VAGINOSIS): Primary | ICD-10-CM

## 2018-04-02 LAB — SL AMB POCT WET MOUNT: ABNORMAL

## 2018-04-02 PROCEDURE — 87210 SMEAR WET MOUNT SALINE/INK: CPT | Performed by: OBSTETRICS & GYNECOLOGY

## 2018-04-02 PROCEDURE — 99214 OFFICE O/P EST MOD 30 MIN: CPT | Performed by: OBSTETRICS & GYNECOLOGY

## 2018-04-02 RX ORDER — METRONIDAZOLE 500 MG/1
500 TABLET ORAL EVERY 12 HOURS SCHEDULED
Qty: 14 TABLET | Refills: 0 | Status: SHIPPED | OUTPATIENT
Start: 2018-04-02 | End: 2018-04-09

## 2018-04-02 NOTE — PROGRESS NOTES
Assessment/Plan:   Vaginal discharge  Bacterial vaginosis     Diagnoses and all orders for this visit:    BV (bacterial vaginosis)  -     POCT wet mount  -     metroNIDAZOLE (FLAGYL) 500 mg tablet; Take 1 tablet (500 mg total) by mouth every 12 (twelve) hours for 7 days    Discharge from the vagina        Subjective:  Vaginal discharge     Patient ID: Nadege Perez is a 79 y o  female  HPI   51-year-old female here for complaints of vaginal discharge and odor  Patient states she was on antibiotics several weeks ago for a sinusitis infection  States she has never had this type of discharge before  Was recently here for annual exam 2 months ago is otherwise doing well  Review of Systems   Genitourinary: Positive for vaginal discharge  Negative for pelvic pain and vaginal bleeding  All other systems reviewed and are negative  Objective:     Physical Exam   Constitutional: She is oriented to person, place, and time  She appears well-developed and well-nourished  HENT:   Head: Normocephalic  Eyes: Pupils are equal, round, and reactive to light  Neck: Normal range of motion  Cardiovascular: Normal rate and regular rhythm  Pulmonary/Chest: Effort normal and breath sounds normal    Abdominal: Soft  Genitourinary: Vagina normal and uterus normal    Genitourinary Comments: Pelvic exam  Normal external genitalia  Light grayish discharge with odor  Flattened shortened cervix which is closed  Normal size uterus  No pelvic masses   Neurological: She is alert and oriented to person, place, and time  Skin: Skin is warm and dry  Psychiatric: She has a normal mood and affect   Her behavior is normal  Thought content normal

## 2018-05-05 ENCOUNTER — HOSPITAL ENCOUNTER (EMERGENCY)
Facility: HOSPITAL | Age: 68
Discharge: HOME | End: 2018-05-05
Attending: EMERGENCY MEDICINE
Payer: MEDICARE

## 2018-05-05 VITALS
OXYGEN SATURATION: 96 % | HEART RATE: 92 BPM | BODY MASS INDEX: 20.97 KG/M2 | SYSTOLIC BLOOD PRESSURE: 125 MMHG | WEIGHT: 126 LBS | TEMPERATURE: 98 F | DIASTOLIC BLOOD PRESSURE: 60 MMHG | RESPIRATION RATE: 18 BRPM

## 2018-05-05 DIAGNOSIS — H43.392 VITREOUS FLOATERS OF LEFT EYE: Primary | ICD-10-CM

## 2018-05-05 PROCEDURE — 99283 EMERGENCY DEPT VISIT LOW MDM: CPT

## 2018-05-05 RX ORDER — PROPARACAINE HYDROCHLORIDE 5 MG/ML
1 SOLUTION/ DROPS OPHTHALMIC ONCE
Status: COMPLETED | OUTPATIENT
Start: 2018-05-05 | End: 2018-05-05

## 2018-05-05 RX ORDER — CARBOXYMETHYLCELLULOSE SODIUM 5 MG/ML
2 SOLUTION/ DROPS OPHTHALMIC 3 TIMES DAILY PRN
Qty: 1 BOTTLE | Refills: 0 | Status: SHIPPED | OUTPATIENT
Start: 2018-05-05 | End: 2019-04-12

## 2018-05-05 RX ADMIN — FLUORESCEIN SODIUM 1 STRIP: 0.6 STRIP OPHTHALMIC at 13:47

## 2018-05-05 RX ADMIN — PROPARACAINE HYDROCHLORIDE 1 DROP: 5 SOLUTION/ DROPS OPHTHALMIC at 13:47

## 2018-05-05 NOTE — ED PROVIDER NOTES
History  Chief Complaint   Patient presents with    Eye Problem     Yesterday was gardening and noted black spot in visionof left eye  Today woke and there was a dark spot and has been seeing bright flashes       History provided by:  Patient   used: No    Eye Pain   Quality:  Left eye floaters  Severity:  Mild  Onset quality:  Gradual  Duration:  1 day  Timing:  Intermittent  Progression:  Waxing and waning  Chronicity:  New  Context:  Patient was doing yard work yesterday, has been having left eye floaters  Relieved by:  Nothing  Worsened by:  None  Ineffective treatments:  None  Associated symptoms: no abdominal pain, no chest pain, no congestion, no cough, no diarrhea, no fever, no headaches, no loss of consciousness, no nausea, no rash, no shortness of breath, no sore throat and no vomiting    Risk factors:  None      Prior to Admission Medications   Prescriptions Last Dose Informant Patient Reported? Taking? EPINEPHrine (EPIPEN) 0 3 mg/0 3 mL SOAJ   Yes Yes   Sig: Inject 0 3 mg into the shoulder, thigh, or buttocks   albuterol (PROVENTIL HFA,VENTOLIN HFA) 90 mcg/act inhaler   Yes Yes   Sig: Inhale 1 puff as needed     fluticasone (FLONASE) 50 mcg/act nasal spray   No Yes   Si spray into each nostril daily      Facility-Administered Medications: None       Past Medical History:   Diagnosis Date    [facility]     [facility]       Past Surgical History:   Procedure Laterality Date    BREAST LUMPECTOMY      BREAST LUMPECTOMY Bilateral     TONSILLECTOMY      WISDOM TOOTH EXTRACTION         Family History   Problem Relation Age of Onset    Uterine cancer Mother     Varicose Veins Mother     Hypertension Mother     Dysfunctional uterine bleeding Mother     COPD Father     Heart disease Father      Cardiac disorder    Heart attack Brother     Diabetes Family      I have reviewed and agree with the history as documented      Social History   Substance Use Topics    Smoking status: Former Smoker    Smokeless tobacco: Never Used      Comment: Per allscripts: former smoker    Alcohol use Yes      Comment: occasional        Review of Systems   Constitutional: Negative for activity change, chills and fever  HENT: Negative for congestion, facial swelling, sore throat and trouble swallowing  Eyes: Positive for pain  Negative for visual disturbance  Respiratory: Negative for cough, chest tightness and shortness of breath  Cardiovascular: Negative for chest pain and leg swelling  Gastrointestinal: Negative for abdominal pain, blood in stool, diarrhea, nausea and vomiting  Genitourinary: Negative for dysuria and flank pain  Musculoskeletal: Negative for back pain, neck pain and neck stiffness  Skin: Negative for pallor and rash  Allergic/Immunologic: Negative for environmental allergies and immunocompromised state  Neurological: Negative for dizziness, loss of consciousness and headaches  Hematological: Negative for adenopathy  Does not bruise/bleed easily  Psychiatric/Behavioral: Negative for agitation and behavioral problems  All other systems reviewed and are negative  Physical Exam  ED Triage Vitals   Temperature Pulse Respirations Blood Pressure SpO2   05/05/18 1243 05/05/18 1243 05/05/18 1314 05/05/18 1243 05/05/18 1243   98 °F (36 7 °C) 92 18 125/60 96 %      Temp src Heart Rate Source Patient Position - Orthostatic VS BP Location FiO2 (%)   -- -- -- -- --             Pain Score       05/05/18 1243       No Pain           Orthostatic Vital Signs  Vitals:    05/05/18 1243   BP: 125/60   Pulse: 92       Physical Exam   Constitutional: She is oriented to person, place, and time  She appears well-developed and well-nourished  No distress  HENT:   Head: Normocephalic and atraumatic  Eyes: EOM are normal  Pupils are equal, round, and reactive to light  Neck: Normal range of motion  Neck supple     Cardiovascular: Normal rate, regular rhythm, normal heart sounds and intact distal pulses  Pulmonary/Chest: Effort normal and breath sounds normal    Abdominal: Soft  Bowel sounds are normal  There is no tenderness  There is no rebound and no guarding  Musculoskeletal: Normal range of motion  Neurological: She is alert and oriented to person, place, and time  Skin: Skin is warm and dry  Psychiatric: She has a normal mood and affect  Nursing note and vitals reviewed  ED Medications  Medications   fluorescein sodium sterile ophthalmic strip 1 strip (1 strip Left Eye Given 5/5/18 1347)   proparacaine (ALCAINE) 0 5 % ophthalmic solution 1 drop (1 drop Left Eye Given 5/5/18 1347)       Diagnostic Studies  Results Reviewed     None                 No orders to display              Procedures  Procedures       Phone Contacts  ED Phone Contact    ED Course                               MDM  Number of Diagnoses or Management Options  Vitreous floaters of left eye: new and does not require workup  Diagnosis management comments: Left eye floaters, normal vision, no diplopia, no HA, fever, no eye lid trauma, no FB seen, Fluorescein stain negative  We will give Eye lubricant eye drops, follow up with ophtho on Monday  CritCare Time    Disposition  Final diagnoses:   Vitreous floaters of left eye     Time reflects when diagnosis was documented in both MDM as applicable and the Disposition within this note     Time User Action Codes Description Comment    5/5/2018  2:03 PM James Light Add [R80 [id number]] Vitreous floaters of left eye       ED Disposition     ED Disposition Condition Comment    Discharge  Brianna Omer discharge to home/self care  Condition at discharge: Stable        Follow-up Information     Follow up With Specialties Details Why 2000 Radha Ortega MD [facility] Medicine   [facility] [address] [address]      Stanley Marroquin MD Ophthalmology Schedule an appointment as soon as possible for a visit  [address] [facility]  [phone]          Discharge Medication List as of 5/5/2018  2:07 PM      START taking these medications    Details   carboxymethylcellulose 0 5 % SOLN Administer 2 drops into the left eye 3 (three) times a day as needed for dry eyes, Starting Sat 5/5/2018, Print         CONTINUE these medications which have NOT CHANGED    Details   albuterol (PROVENTIL HFA,VENTOLIN HFA) 90 mcg/act inhaler Inhale 1 puff as needed  , Historical Med      EPINEPHrine (EPIPEN) 0 3 mg/0 3 mL SOAJ Inject 0 3 mg into the shoulder, thigh, or buttocks, Historical Med      fluticasone (FLONASE) 50 mcg/act nasal spray 1 spray into each nostril daily, Starting Fri 3/16/2018, Normal           No discharge procedures on file      ED Provider  Electronically Signed by           Jose J Coto MD  05/05/18 5818

## 2018-05-05 NOTE — DISCHARGE INSTRUCTIONS
Visual Floaters   WHAT YOU NEED TO KNOW:   Floaters are specks that appear to move around in your field of vision  They are dark and shaped like spots or cobwebs  If you try to look straight at them, they seem to dart away  DISCHARGE INSTRUCTIONS:   Follow up with your healthcare provider as directed: You may be referred to an ophthalmologist  Write down your questions so you remember to ask them during your visits  Protect your eyes:   · Get annual eye exams  Your ophthalmologist or optometrist will examine your eyes and test your vision  · Wear a hat and sunglasses  with ultraviolet (UV) protection lenses to protect your eyes when you are outdoors  · Manage your health conditions  See your healthcare provider regularly if you have a health condition that may affect your vision, such as diabetes or high blood pressure  Contact your healthcare provider or ophthalmologist if:   · You start to see more floaters over time  · You have eye pain or blurred vision, or light hurts your eyes  · You have questions or concerns about your care  Return to the emergency department if:   · You suddenly see more floaters, or flashing lights  · You have a gradual or sudden loss of vision, or a change in your vision  © 2017 [address]  Information is for End User's use only and may not be sold, redistributed or otherwise used for commercial purposes  All illustrations and images included in CareNotes® are the copyrighted property of A D A M , Inc  or [facility]  The above information is an  only  It is not intended as medical advice for individual conditions or treatments  Talk to your doctor, nurse or pharmacist before following any medical regimen to see if it is safe and effective for you

## 2018-05-09 DIAGNOSIS — I83.819 VARICOSE VEINS WITH PAIN: Primary | ICD-10-CM

## 2018-05-14 ENCOUNTER — HOSPITAL ENCOUNTER (OUTPATIENT)
Dept: NON INVASIVE DIAGNOSTICS | Facility: CLINIC | Age: 68
Discharge: HOME | End: 2018-05-14
Payer: MEDICARE

## 2018-05-14 DIAGNOSIS — I83.819 VARICOSE VEINS WITH PAIN: ICD-10-CM

## 2018-05-14 PROBLEM — I83.893 SYMPTOMATIC VARICOSE VEINS OF BOTH LOWER EXTREMITIES: Status: ACTIVE | Noted: 2017-12-01

## 2018-05-14 PROCEDURE — 93970 EXTREMITY STUDY: CPT

## 2018-05-14 NOTE — ASSESSMENT & PLAN NOTE
81-year-old female with symptomatic varicose veins of the bilateral lower extremities, R>L, with associated R GSV incompetence  Patient's symptoms unchanged after 3 month trial conservative measures to include daily use of compression stockings  -recommend R EVLT with stab phlebectomies  -risks and benefits of treatment options discussed with patient by Dr Debra Brown and patient wishes to proceed    Operative consent obtained by Dr Debra Brown  -follow-up in office per postoperative protocol  -Clinical images obtained

## 2018-05-15 PROCEDURE — 93970 EXTREMITY STUDY: CPT | Performed by: SURGERY

## 2018-05-16 ENCOUNTER — OFFICE VISIT (OUTPATIENT)
Dept: VASCULAR SURGERY | Facility: CLINIC | Age: 68
End: 2018-05-16
Payer: MEDICARE

## 2018-05-16 VITALS
DIASTOLIC BLOOD PRESSURE: 76 MMHG | RESPIRATION RATE: 18 BRPM | HEIGHT: 65 IN | SYSTOLIC BLOOD PRESSURE: 102 MMHG | HEART RATE: 94 BPM | BODY MASS INDEX: 21.99 KG/M2 | WEIGHT: 132 LBS

## 2018-05-16 DIAGNOSIS — I83.893 SYMPTOMATIC VARICOSE VEINS OF BOTH LOWER EXTREMITIES: Primary | ICD-10-CM

## 2018-05-16 PROCEDURE — 99213 OFFICE O/P EST LOW 20 MIN: CPT | Performed by: PHYSICIAN ASSISTANT

## 2018-05-16 RX ORDER — SODIUM CHLORIDE 9 MG/ML
100 INJECTION, SOLUTION INTRAVENOUS CONTINUOUS
Status: CANCELLED | OUTPATIENT
Start: 2018-05-16

## 2018-05-16 NOTE — PATIENT INSTRUCTIONS
DISCHARGE INSTRUCTIONS  VARICOSE VEIN SURGERY    1) When released from the hospital, you should have a compression bandage in place on the operated leg  This bandage should feel snug but not too tight  If the bandage becomes blood soaked or painfully tight, elevate your leg and call your surgeon immediately  2) If the operated leg becomes increasingly painful or swollen, or if there is increasing redness or pain around your incision, contact our office  3) On the day of your operation, take it easy and elevate your leg as much as possible  You can take short walks around the house  When sitting, the leg should be elevated  The preferred position is to have the leg at or above the level of the heart  Starting on the first day after surgery, light walking is strongly encouraged as tolerated  After your ultrasound test, you can resume your normal activity, but no heavy lifting or strenuous exercise for 2 weeks  4) Some bruising of the skin is common after varicose vein surgery  This can be lessened by strict elevation of the leg  Many patients will notice some numbness of the shin, ankle, calf, or the top of the foot  This usually fades with time, but may be persistent  After surgery you can expect bruising, swelling and hard knots on your leg  As your body heals the bruising will fade and the swelling and knots will subside  5) Observe incisions daily  Report to our office any of the following:  a) Any areas that are red and angry in appearance  b) Any drainage that is milky or cloudy in appearance or that has a foul odor  c) Elevated temperature of 100 5 degrees F or greater  6) Apply sunscreen with SPF 30 to incisions while sun bathing for up to one year after surgery to reduce the chances of your incisions darkening  7) Your first post-operative appointment will be 2-3 days after your surgery   At this appointment your bandages will be removed and you will be seen by a Vascular Surgeon, Nurse Practicioner, or Physician Assistant  An appointment for a follow up Doppler study will be scheduled 5-7 days after surgery  8)         If have any questions, please call our office at ([phone])  Pre-Surgery Instructions:   Medication Instructions    albuterol (PROVENTIL HFA,VENTOLIN HFA) 90 mcg/act inhaler Take morning of surgery    carboxymethylcellulose 0 5 % SOLN Take morning of surgery    EPINEPHrine (EPIPEN) 0 3 mg/0 3 mL SOAJ per anesthesia guidelines     fluticasone (FLONASE) 50 mcg/act nasal spray Take morning of surgery     -recommend right lower extremity EVLT with stab phlebectomies as described in office  -office will be contacting you with date and time of scheduled surgery  -you will follow up in the office after surgery as discussed    At that time we can discuss surgery on the left leg in the future  -please contact the office in the interim with any questions, concerns or change in symptoms
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
## 2018-05-18 ENCOUNTER — PREP FOR PROCEDURE (OUTPATIENT)
Dept: VASCULAR SURGERY | Facility: CLINIC | Age: 68
End: 2018-05-18

## 2018-05-18 ENCOUNTER — TELEPHONE (OUTPATIENT)
Dept: VASCULAR SURGERY | Facility: CLINIC | Age: 68
End: 2018-05-18

## 2018-05-18 DIAGNOSIS — I83.813 VARICOSE VEINS OF BILATERAL LOWER EXTREMITIES WITH PAIN: Primary | ICD-10-CM

## 2018-05-18 NOTE — TELEPHONE ENCOUNTER
I spoke to pt yesterday to discuss date of EVLT surgery with Dr Sameera Ramirez  Pt was not at home and said she would call me back when she was home  I have not heard back as pf yet, so I called and LMOM

## 2018-05-18 NOTE — TELEPHONE ENCOUNTER
Pt called back and we confirmed date of surgery for 8/3/18 at [facility] with Dr Laurie Burns  She will go for pre admission testing ordered and have an H&P by PCP  All other instructions reviewed

## 2018-06-21 ENCOUNTER — OFFICE VISIT (OUTPATIENT)
Dept: FAMILY MEDICINE CLINIC | Facility: CLINIC | Age: 68
End: 2018-06-21
Payer: MEDICARE

## 2018-06-21 VITALS
DIASTOLIC BLOOD PRESSURE: 78 MMHG | SYSTOLIC BLOOD PRESSURE: 104 MMHG | BODY MASS INDEX: 20.49 KG/M2 | TEMPERATURE: 98.3 F | WEIGHT: 123 LBS | HEIGHT: 65 IN

## 2018-06-21 DIAGNOSIS — J30.1 ALLERGIC RHINITIS DUE TO POLLEN, UNSPECIFIED SEASONALITY: ICD-10-CM

## 2018-06-21 DIAGNOSIS — Z12.11 SCREENING FOR COLON CANCER: ICD-10-CM

## 2018-06-21 DIAGNOSIS — F32.A DEPRESSION, UNSPECIFIED DEPRESSION TYPE: ICD-10-CM

## 2018-06-21 DIAGNOSIS — L30.9 DERMATITIS: Primary | ICD-10-CM

## 2018-06-21 PROCEDURE — 99214 OFFICE O/P EST MOD 30 MIN: CPT | Performed by: FAMILY MEDICINE

## 2018-06-21 RX ORDER — TRIAMCINOLONE ACETONIDE 1 MG/G
CREAM TOPICAL 2 TIMES DAILY
Qty: 30 G | Refills: 0 | Status: SHIPPED | OUTPATIENT
Start: 2018-06-21 | End: 2018-08-01 | Stop reason: CLARIF

## 2018-06-21 RX ORDER — FLUTICASONE PROPIONATE 50 MCG
2 SPRAY, SUSPENSION (ML) NASAL DAILY
Qty: 16 G | Refills: 0 | Status: SHIPPED | OUTPATIENT
Start: 2018-06-21 | End: 2018-08-01 | Stop reason: SDUPTHER

## 2018-06-21 RX ORDER — ESCITALOPRAM OXALATE 10 MG/1
10 TABLET ORAL DAILY
Qty: 30 TABLET | Refills: 0 | Status: SHIPPED | OUTPATIENT
Start: 2018-06-21 | End: 2018-07-18 | Stop reason: SDUPTHER

## 2018-06-21 NOTE — PROGRESS NOTES
Assessment/Plan:    59-year-old woman with:  Dermatitis, depression and allergic rhinitis  Discussed workup and treatment options with risks and benefits  Will give topical steroid to use times 1-2 weeks max and Sarna lotion as as needed for itching  Patient to continue with Flonase and will increase Lexapro to 10 milligrams  Patient follow-up at next scheduled appointment  No problem-specific Assessment & Plan notes found for this encounter  Diagnoses and all orders for this visit:    Dermatitis  -     triamcinolone (KENALOG) 0 1 % cream; Apply topically 2 (two) times a day  -     camphor-menthol (SARNA) lotion; Apply topically as needed for itching    Depression, unspecified depression type  -     escitalopram (LEXAPRO) 10 mg tablet; Take 1 tablet (10 mg total) by mouth daily    Allergic rhinitis due to pollen, unspecified seasonality  -     fluticasone (FLONASE) 50 mcg/act nasal spray; 2 sprays into each nostril daily    Screening for colon cancer  -     Occult Bloood,Fecal Immunochemical; Future          Subjective:   Chief Complaint   Patient presents with    Rash     today is the worst and itchy          Patient ID: Sara Chavez is a 79 y o  female  Patient is a 59-year-old woman who presents complaining of a red itchy rash on her arms  No fevers chills nausea vomiting  Patient does admit some allergies and also would like to try higher dose of her Lexapro for her mood  Patient denies other complaints at this time  Rash         The following portions of the patient's history were reviewed and updated as appropriate: allergies, current medications, past family history, past medical history, past social history, past surgical history and problem list     Review of Systems   Constitutional: Negative  HENT: Negative  Eyes: Negative  Respiratory: Negative  Cardiovascular: Negative  Gastrointestinal: Negative  Endocrine: Negative  Genitourinary: Negative  Musculoskeletal: Negative  Skin: Positive for rash  Allergic/Immunologic: Negative  Neurological: Negative  Hematological: Negative  Psychiatric/Behavioral: Negative  All other systems reviewed and are negative  Objective:      /78 (BP Location: Left arm, Patient Position: Sitting, Cuff Size: Standard)   Temp 98 3 °F (36 8 °C) (Tympanic)   Ht 5' 5" (1 651 m)   Wt 55 8 kg (123 lb)   BMI 20 47 kg/m²          Physical Exam   Constitutional: She is oriented to person, place, and time  She appears well-developed and well-nourished  HENT:   Head: Atraumatic  Right Ear: External ear normal    Left Ear: External ear normal    Eyes: Conjunctivae and EOM are normal  Pupils are equal, round, and reactive to light  Neck: Normal range of motion  Cardiovascular: Normal rate, regular rhythm and normal heart sounds  Pulmonary/Chest: Effort normal and breath sounds normal  No respiratory distress  Abdominal: Soft  She exhibits no distension  There is no tenderness  There is no rebound and no guarding  Musculoskeletal: Normal range of motion  Neurological: She is alert and oriented to person, place, and time  No cranial nerve deficit  Skin: Skin is warm and dry  Psychiatric: She has a normal mood and affect   Her behavior is normal  Judgment and thought content normal

## 2018-07-06 ENCOUNTER — PREP FOR PROCEDURE (OUTPATIENT)
Dept: VASCULAR SURGERY | Facility: CLINIC | Age: 68
End: 2018-07-06

## 2018-07-06 ENCOUNTER — TELEPHONE (OUTPATIENT)
Dept: VASCULAR SURGERY | Facility: CLINIC | Age: 68
End: 2018-07-06

## 2018-07-06 DIAGNOSIS — I83.93 VARICOSE VEINS OF BOTH LOWER EXTREMITIES: Primary | ICD-10-CM

## 2018-07-06 NOTE — TELEPHONE ENCOUNTER
Pt called to reschedule her EVLT surgery with Dr Latha Funk due to a family emergency  She wishes to reschedule for October  I called pt back with a reschedule date of 10/5/18  I left a message on her voice mail that I am scheduling it for that date  I will mail to her new surgery papers, PAT Rxs and an HIP form, explaining that it needs to be done within 30 days of surgery  She is to call back if this date is not good for her

## 2018-07-18 DIAGNOSIS — F32.A DEPRESSION, UNSPECIFIED DEPRESSION TYPE: ICD-10-CM

## 2018-07-18 RX ORDER — ESCITALOPRAM OXALATE 10 MG/1
TABLET ORAL
Qty: 30 TABLET | Refills: 0 | Status: SHIPPED | OUTPATIENT
Start: 2018-07-18 | End: 2018-09-04 | Stop reason: SDUPTHER

## 2018-07-31 ENCOUNTER — TELEPHONE (OUTPATIENT)
Dept: VASCULAR SURGERY | Facility: CLINIC | Age: 68
End: 2018-07-31

## 2018-07-31 PROBLEM — I80.01 SUPERFICIAL PHLEBITIS OF LEG, RIGHT: Status: ACTIVE | Noted: 2018-07-31

## 2018-07-31 NOTE — TELEPHONE ENCOUNTER
Pt called, she is sched for R evlt in October  Area of 2 bulging VV on R calf are now larger, she has a redness along the vein, slightly tender to touch, a little warm  She noticed approx one week ago after working in garden  She is wearing comp hose and elevating  It has improved but not resolved completely  S/w R Denis CRESPO  Pt to cont comp hose, elevation, she should use warm compresses and nsaids  She should f/u w/ someone in office or pcp  Pt informed of same, she has been using nsaids also, will cont to use, advised to take w/ food  She will begin warm compresses, if no improvement or worsens she will call us  She would like to sched ov here, transf to [facility] to sched

## 2018-08-01 ENCOUNTER — OFFICE VISIT (OUTPATIENT)
Dept: VASCULAR SURGERY | Facility: CLINIC | Age: 68
End: 2018-08-01
Payer: MEDICARE

## 2018-08-01 VITALS
WEIGHT: 128 LBS | SYSTOLIC BLOOD PRESSURE: 122 MMHG | TEMPERATURE: 99.7 F | HEIGHT: 65 IN | DIASTOLIC BLOOD PRESSURE: 78 MMHG | BODY MASS INDEX: 21.33 KG/M2

## 2018-08-01 DIAGNOSIS — I80.01 SUPERFICIAL PHLEBITIS OF LEG, RIGHT: Primary | ICD-10-CM

## 2018-08-01 DIAGNOSIS — I83.893 SYMPTOMATIC VARICOSE VEINS OF BOTH LOWER EXTREMITIES: ICD-10-CM

## 2018-08-01 PROCEDURE — 99213 OFFICE O/P EST LOW 20 MIN: CPT | Performed by: PHYSICIAN ASSISTANT

## 2018-08-01 NOTE — PATIENT INSTRUCTIONS
Phlebitis   WHAT YOU NEED TO KNOW:   What is phlebitis? Phlebitis is inflammation of the wall of your vein  Inflammation may be caused by damage to or infection of your vein  Phlebitis may occur in a vein in your arm or leg  Symptoms include pain, redness, and swelling near the vein  Symptoms may appear when you are receiving an IV medication, or 48 to 96 hours after you receive the medicine  What increases my risk for phlebitis? · An IV catheter placed in a vein in your arm or leg    · IV injections of amiodarone, vancomycin, ciprofloxacin, or chemotherapy medicines    · A condition that affects your blood vessels, such as varicose veins or venous insufficiency    · IV drug abuse  How is phlebitis treated? Medicines may be given to decrease pain and swelling  Your IV catheter will be removed  Do the following to manage your symptoms:  · Apply a warm compress to your vein  This will help decrease swelling and pain  Wet a washcloth in warm water  Do not  use hot water  Apply the warm compress for 10 minutes  Repeat this 4 times each day  · Elevate your leg or arm above the level of your heart as often as you can  This will help decrease swelling and pain  Prop your leg or arm on pillows or blankets to keep it elevated comfortably  · Wear pressure stockings if directed  Pressure stockings improve blood flow and help decrease pain and swelling  Pressure stockings can also help decrease your risk for blood clots in your legs  Wear the stockings during the day  Do not wear them overnight when you sleep  · Do not inject illegal drugs  Talk to your healthcare provider if you use IV drugs and need help to quit  When should I seek immediate care? · Your leg or arm turns pale or blue  · Your leg or arm feels hot or cold  · Your arm or leg feels warm, tender, and painful  It may look swollen and red  When should I contact my healthcare provider? · You have a fever       · You have more pain, swelling, or warmth near your vein  · Your symptoms do not improve within 72 hours  · You have questions or concerns about your condition or care  CARE AGREEMENT:   You have the right to help plan your care  Learn about your health condition and how it may be treated  Discuss treatment options with your caregivers to decide what care you want to receive  You always have the right to refuse treatment  The above information is an  only  It is not intended as medical advice for individual conditions or treatments  Talk to your doctor, nurse or pharmacist before following any medical regimen to see if it is safe and effective for you  © 2017 [address]  Information is for End User's use only and may not be sold, redistributed or otherwise used for commercial purposes  All illustrations and images included in CareNotes® are the copyrighted property of A [employer] M [employer]  or [employer]      -warm compresses to affected area as needed  -NSAIDs as needed for pain related to full bite is  -elevate extremities for symptomatic relief  -continue use of compression stockings  -follow-up in office as scheduled after R EVLT

## 2018-08-01 NOTE — ASSESSMENT & PLAN NOTE
45-year-old female with symptomatic varicose veins of the bilateral lower extremities, R>L, with associated R GSV incompetence  Patient's symptoms unchanged after 3 month trial conservative measures to include daily use of compression stockings    -scheduled for R EVLT with Dr Mirta Domínguez on 10/5/2018  -continue as previously scheduled  -follow-up in office as per postoperative protocol

## 2018-08-01 NOTE — PROGRESS NOTES
Superficial phlebitis of leg, right  Acute superficial thrombophlebitis of R medial proximal calf superficial varicosity, resolving  -continue local therapy and conservative measures  -continue warm compresses and NSAIDs p r n   -continue compression stockings, lower extremity elevation, skin moisturization  -instructed to contact the office with any changes in symptoms    Symptomatic varicose veins of both lower extremities  82-year-old female with symptomatic varicose veins of the bilateral lower extremities, R>L, with associated R GSV incompetence  Patient's symptoms unchanged after 3 month trial conservative measures to include daily use of compression stockings  -scheduled for R EVLT with Dr Chito Glass on 10/5/2018  -continue as previously scheduled  -follow-up in office as per postoperative protocol      Assessment/Plan   Diagnoses and all orders for this visit:    Superficial phlebitis of leg, right    Symptomatic varicose veins of both lower extremities        No chief complaint on file  Subjective   Patient ID: Mohit Villagran is a 79 y o  female  Chief complaint: Pt is here c/o warmness and hardness to veins on right leg that has been ongoing for less than a week  Pt c/o discoloration to right ankle  Pt is wearing compression stockings daily  Pt is scheduled for EVLT in October  ]     82-year-old female with HLD and symptomatic varicose veins of the bilateral lower extremities, R>L, with associated R GSV incompetence, who is followed by Dr Chito Glass related to her varicose veins and scheduled for R EVLT and stab phlebectomies on 10/5/2018  The patient contact the office secondary to painful palpable right proximal medial calf varicosity with associated erythema  Local conservative measures were recommended the patient presents to the office for evaluation  Patient denies fever, chills, sweats  Her symptoms have significantly improved since the utilization of warm compresses and NSAIDs    Patient denies shortness of breath, chest pain, orthopnea, PND, right calf pain or right lower extremity edema  There are no noticeable skin changes but a palpable likely thrombosed small local varicosity at the medial proximal calf  The following portions of the patient's history were reviewed and updated as appropriate: allergies, current medications, past family history, past medical history, past social history, past surgical history and problem list     Review of Systems    Patient Active Problem List   Diagnosis    Depression    Hyperlipidemia    Pleural thickening    Symptomatic varicose veins of both lower extremities    Vitamin D deficiency    Changing skin lesion    Asthma    Other acute recurrent sinusitis    Dermatitis    Superficial phlebitis of leg, right       Past Surgical History:   Procedure Laterality Date    BREAST LUMPECTOMY      BREAST LUMPECTOMY Bilateral     TONSILLECTOMY      WISDOM TOOTH EXTRACTION         Family History   Problem Relation Age of Onset    Uterine cancer Mother     Varicose Veins Mother     Hypertension Mother     Dysfunctional uterine bleeding Mother     COPD Father     Heart disease Father         Cardiac disorder    Heart attack Brother     Diabetes Family        Social History     Social History    Marital status: Single     Spouse name: N/A    Number of children: N/A    Years of education: N/A     Occupational History    Not on file       Social History Main Topics    Smoking status: Former Smoker    Smokeless tobacco: Never Used      Comment: Per allscripts: former smoker    Alcohol use Yes      Comment: occasional    Drug use: No    Sexual activity: Not Currently     Other Topics Concern    Not on file     Social History Narrative    Always uses seatbelt        Caffeine use               Allergies   Allergen Reactions    Bee Venom Hives    Isoflavones     Mold Extract  [Trichophyton]     Penicillin V     Penicillins          Current Outpatient Prescriptions:     albuterol (PROVENTIL HFA,VENTOLIN HFA) 90 mcg/act inhaler, Inhale 1 puff as needed  , Disp: , Rfl:     carboxymethylcellulose 0 5 % SOLN, Administer 2 drops into the left eye 3 (three) times a day as needed for dry eyes, Disp: 1 Bottle, Rfl: 0    EPINEPHrine (EPIPEN) 0 3 mg/0 3 mL SOAJ, Inject 0 3 mg into the shoulder, thigh, or buttocks, Disp: , Rfl:     fluticasone (FLONASE) 50 mcg/act nasal spray, 1 spray into each nostril daily, Disp: 16 g, Rfl: 0    escitalopram (LEXAPRO) 10 mg tablet, TAKE 1 TABLET BY MOUTH EVERY DAY, Disp: 30 tablet, Rfl: 0    Objective     Physical Exam:    General appearance: alert and oriented, in no acute distress  Skin: Skin color, texture, turgor normal  No rashes or lesions  Neurologic: Grossly normal  Head: Normocephalic, without obvious abnormality, atraumatic  Neck: no adenopathy, no carotid bruit, no JVD, supple, symmetrical, trachea midline and thyroid not enlarged, symmetric, no tenderness/mass/nodules  Back: symmetric, no curvature  ROM normal  No CVA tenderness  Lungs: clear to auscultation bilaterally  Chest wall: no tenderness  Heart: regular rate and rhythm, S1, S2 normal, no murmur, click, rub or gallop  Abdomen: soft, non-tender; bowel sounds normal; no masses,  no organomegaly  Extremities: extremities normal, warm and well-perfused; no cyanosis, clubbing, or edema and No lower extremity venous ulcerations, hemosiderin staining or bleeding varicosities  Superficial varicosities noted bilateral lower extremities with reticular varicosities noted of right posterior calf  Palpable small thrombosed varicosity at the proximal medial calf without associated erythema, induration, cellulitis or discoloration  Minimal tenderness with palpation area      Pulse exam:  Radial: Right: 2+ Left[de-identified] 2+  Popliteal: Right: 1+ Left: 1+  DP: Right: 2+ Left: 2+

## 2018-08-01 NOTE — ASSESSMENT & PLAN NOTE
Acute superficial thrombophlebitis of R medial proximal calf superficial varicosity, resolving  -continue local therapy and conservative measures  -continue warm compresses and NSAIDs p r n   -continue compression stockings, lower extremity elevation, skin moisturization  -instructed to contact the office with any changes in symptoms

## 2018-08-21 ENCOUNTER — TELEPHONE (OUTPATIENT)
Dept: VASCULAR SURGERY | Facility: CLINIC | Age: 68
End: 2018-08-21

## 2018-08-21 NOTE — TELEPHONE ENCOUNTER
Due to a change in Dr Tapia Scales schedule we had to reschedule pts surgery to 10/12/18  I spoke with her today and confirmed date, she is okay with the change  She will get blood work and H&P within 30 days of surgery  All other instructions clear

## 2018-09-04 DIAGNOSIS — F32.A DEPRESSION, UNSPECIFIED DEPRESSION TYPE: ICD-10-CM

## 2018-09-04 RX ORDER — ESCITALOPRAM OXALATE 10 MG/1
TABLET ORAL
Qty: 30 TABLET | Refills: 0 | Status: SHIPPED | OUTPATIENT
Start: 2018-09-04 | End: 2019-04-12

## 2018-09-13 ENCOUNTER — TELEPHONE (OUTPATIENT)
Dept: VASCULAR SURGERY | Facility: CLINIC | Age: 68
End: 2018-09-13

## 2018-09-13 NOTE — TELEPHONE ENCOUNTER
I called pt to move up the date of her surgery by one week  Pt could not do this date  She asked that we just wait until the new year and she will call back to reschedule  She has a problem getting a  right now  She will call back when ready to reschedule  Her paperwork was placed in the holding bin

## 2018-12-24 NOTE — TELEPHONE ENCOUNTER
Scanned in surgery consent  Patient will require a new office visit prior to scheduling surgery  Last office visit was 5/16/18 with Dr Stephanie Beaulieu

## 2019-04-12 ENCOUNTER — OFFICE VISIT (OUTPATIENT)
Dept: FAMILY MEDICINE CLINIC | Facility: CLINIC | Age: 69
End: 2019-04-12
Payer: MEDICARE

## 2019-04-12 VITALS
DIASTOLIC BLOOD PRESSURE: 80 MMHG | HEART RATE: 76 BPM | HEIGHT: 64 IN | BODY MASS INDEX: 21.27 KG/M2 | RESPIRATION RATE: 16 BRPM | OXYGEN SATURATION: 99 % | SYSTOLIC BLOOD PRESSURE: 126 MMHG | WEIGHT: 124.6 LBS

## 2019-04-12 DIAGNOSIS — Z00.00 MEDICARE ANNUAL WELLNESS VISIT, SUBSEQUENT: ICD-10-CM

## 2019-04-12 DIAGNOSIS — Z12.11 SCREENING FOR COLON CANCER: Primary | ICD-10-CM

## 2019-04-12 DIAGNOSIS — E55.9 VITAMIN D DEFICIENCY: ICD-10-CM

## 2019-04-12 DIAGNOSIS — F32.A DEPRESSION, UNSPECIFIED DEPRESSION TYPE: ICD-10-CM

## 2019-04-12 DIAGNOSIS — Z23 ENCOUNTER FOR VACCINATION: ICD-10-CM

## 2019-04-12 DIAGNOSIS — E78.5 HYPERLIPIDEMIA, UNSPECIFIED HYPERLIPIDEMIA TYPE: ICD-10-CM

## 2019-04-12 DIAGNOSIS — Z01.419 ENCOUNTER FOR ANNUAL ROUTINE GYNECOLOGICAL EXAMINATION: ICD-10-CM

## 2019-04-12 DIAGNOSIS — Z12.39 BREAST CANCER SCREENING: ICD-10-CM

## 2019-04-12 DIAGNOSIS — J45.909 UNCOMPLICATED ASTHMA, UNSPECIFIED ASTHMA SEVERITY, UNSPECIFIED WHETHER PERSISTENT: ICD-10-CM

## 2019-04-12 DIAGNOSIS — F33.41 RECURRENT MAJOR DEPRESSIVE DISORDER, IN PARTIAL REMISSION (HCC): ICD-10-CM

## 2019-04-12 DIAGNOSIS — Z11.59 NEED FOR HEPATITIS C SCREENING TEST: ICD-10-CM

## 2019-04-12 PROCEDURE — 99214 OFFICE O/P EST MOD 30 MIN: CPT | Performed by: FAMILY MEDICINE

## 2019-04-12 PROCEDURE — G0439 PPPS, SUBSEQ VISIT: HCPCS | Performed by: FAMILY MEDICINE

## 2019-04-12 RX ORDER — ESCITALOPRAM OXALATE 10 MG/1
10 TABLET ORAL DAILY
Qty: 90 TABLET | Refills: 1 | Status: SHIPPED | OUTPATIENT
Start: 2019-04-12 | End: 2020-04-15

## 2019-04-12 RX ORDER — ALBUTEROL SULFATE 90 UG/1
1 AEROSOL, METERED RESPIRATORY (INHALATION) AS NEEDED
Qty: 1 INHALER | Refills: 0 | Status: SHIPPED | OUTPATIENT
Start: 2019-04-12 | End: 2019-07-27 | Stop reason: SDUPTHER

## 2019-04-12 RX ORDER — PREDNISONE 20 MG/1
40 TABLET ORAL DAILY
Qty: 10 TABLET | Refills: 0 | Status: SHIPPED | OUTPATIENT
Start: 2019-04-12 | End: 2020-04-15

## 2019-04-15 ENCOUNTER — HOSPITAL ENCOUNTER (OUTPATIENT)
Dept: MAMMOGRAPHY | Facility: MEDICAL CENTER | Age: 69
Discharge: HOME | End: 2019-04-15
Payer: MEDICARE

## 2019-04-15 DIAGNOSIS — Z12.39 BREAST CANCER SCREENING: ICD-10-CM

## 2019-04-15 LAB
25(OH)D3 SERPL-MCNC: 35 NG/ML (ref 30–100)
ALBUMIN SERPL-MCNC: 4 G/DL (ref 3.6–5.1)
ALBUMIN/GLOB SERPL: 1.7 (CALC) (ref 1–2.5)
ALP SERPL-CCNC: 71 U/L (ref 33–130)
ALT SERPL-CCNC: 12 U/L (ref 6–29)
AST SERPL-CCNC: 17 U/L (ref 10–35)
BASOPHILS # BLD AUTO: 94 CELLS/UL (ref 0–200)
BASOPHILS NFR BLD AUTO: 1.2 %
BILIRUB SERPL-MCNC: 0.5 MG/DL (ref 0.2–1.2)
BUN SERPL-MCNC: 12 MG/DL (ref 7–25)
BUN/CREAT SERPL: NORMAL (CALC) (ref 6–22)
CALCIUM SERPL-MCNC: 9.2 MG/DL (ref 8.6–10.4)
CHLORIDE SERPL-SCNC: 105 MMOL/L (ref 98–110)
CHOLEST SERPL-MCNC: 186 MG/DL
CHOLEST/HDLC SERPL: 2.4 (CALC)
CO2 SERPL-SCNC: 28 MMOL/L (ref 20–32)
CREAT SERPL-MCNC: 0.8 MG/DL (ref 0.5–0.99)
EOSINOPHIL # BLD AUTO: 140 CELLS/UL (ref 15–500)
EOSINOPHIL NFR BLD AUTO: 1.8 %
ERYTHROCYTE [DISTWIDTH] IN BLOOD BY AUTOMATED COUNT: 12.6 % (ref 11–15)
GLOBULIN SER CALC-MCNC: 2.4 G/DL (CALC) (ref 1.9–3.7)
GLUCOSE SERPL-MCNC: 85 MG/DL (ref 65–99)
HCT VFR BLD AUTO: 45.1 % (ref 35–45)
HCV AB S/CO SERPL IA: 0
HCV AB SERPL QL IA: NORMAL
HDLC SERPL-MCNC: 77 MG/DL
HGB BLD-MCNC: 14.9 G/DL (ref 11.7–15.5)
LDLC SERPL CALC-MCNC: 88 MG/DL (CALC)
LYMPHOCYTES # BLD AUTO: 1638 CELLS/UL (ref 850–3900)
LYMPHOCYTES NFR BLD AUTO: 21 %
MCH RBC QN AUTO: 30 PG (ref 27–33)
MCHC RBC AUTO-ENTMCNC: 33 G/DL (ref 32–36)
MCV RBC AUTO: 90.7 FL (ref 80–100)
MONOCYTES # BLD AUTO: 562 CELLS/UL (ref 200–950)
MONOCYTES NFR BLD AUTO: 7.2 %
NEUTROPHILS # BLD AUTO: 5366 CELLS/UL (ref 1500–7800)
NEUTROPHILS NFR BLD AUTO: 68.8 %
NONHDLC SERPL-MCNC: 109 MG/DL (CALC)
PLATELET # BLD AUTO: 194 THOUSAND/UL (ref 140–400)
PMV BLD REES-ECKER: 11.7 FL (ref 7.5–12.5)
POTASSIUM SERPL-SCNC: 4.6 MMOL/L (ref 3.5–5.3)
PROT SERPL-MCNC: 6.4 G/DL (ref 6.1–8.1)
RBC # BLD AUTO: 4.97 MILLION/UL (ref 3.8–5.1)
SL AMB EGFR AFRICAN AMERICAN: 88 ML/MIN/1.73M2
SL AMB EGFR NON AFRICAN AMERICAN: 76 ML/MIN/1.73M2
SODIUM SERPL-SCNC: 138 MMOL/L (ref 135–146)
TRIGL SERPL-MCNC: 111 MG/DL
TSH SERPL-ACNC: 0.68 MIU/L (ref 0.4–4.5)
WBC # BLD AUTO: 7.8 THOUSAND/UL (ref 3.8–10.8)

## 2019-04-15 PROCEDURE — 77063 BREAST TOMOSYNTHESIS BI: CPT

## 2019-04-15 PROCEDURE — 77067 SCR MAMMO BI INCL CAD: CPT

## 2019-04-16 ENCOUNTER — TELEPHONE (OUTPATIENT)
Dept: FAMILY MEDICINE CLINIC | Facility: CLINIC | Age: 69
End: 2019-04-16

## 2019-04-16 ENCOUNTER — APPOINTMENT (OUTPATIENT)
Dept: LAB | Facility: HOSPITAL | Age: 69
End: 2019-04-16
Payer: MEDICARE

## 2019-04-16 DIAGNOSIS — Z12.11 SCREENING FOR COLON CANCER: ICD-10-CM

## 2019-04-16 LAB — HEMOCCULT STL QL IA: NEGATIVE

## 2019-04-16 PROCEDURE — G0328 FECAL BLOOD SCRN IMMUNOASSAY: HCPCS

## 2019-07-27 DIAGNOSIS — J45.909 UNCOMPLICATED ASTHMA, UNSPECIFIED ASTHMA SEVERITY, UNSPECIFIED WHETHER PERSISTENT: ICD-10-CM

## 2019-07-29 RX ORDER — ALBUTEROL SULFATE 90 UG/1
1 AEROSOL, METERED RESPIRATORY (INHALATION) AS NEEDED
Qty: 8.5 INHALER | Refills: 0 | Status: SHIPPED | OUTPATIENT
Start: 2019-07-29 | End: 2020-03-11 | Stop reason: SDUPTHER

## 2019-09-18 ENCOUNTER — OFFICE VISIT (OUTPATIENT)
Dept: FAMILY MEDICINE CLINIC | Facility: CLINIC | Age: 69
End: 2019-09-18
Payer: MEDICARE

## 2019-09-18 VITALS
HEIGHT: 64 IN | DIASTOLIC BLOOD PRESSURE: 68 MMHG | BODY MASS INDEX: 21.17 KG/M2 | SYSTOLIC BLOOD PRESSURE: 122 MMHG | WEIGHT: 124 LBS | TEMPERATURE: 98.9 F

## 2019-09-18 DIAGNOSIS — J45.909 UNCOMPLICATED ASTHMA, UNSPECIFIED ASTHMA SEVERITY, UNSPECIFIED WHETHER PERSISTENT: ICD-10-CM

## 2019-09-18 DIAGNOSIS — S89.92XA INJURY OF LEFT KNEE, INITIAL ENCOUNTER: Primary | ICD-10-CM

## 2019-09-18 PROCEDURE — 99213 OFFICE O/P EST LOW 20 MIN: CPT | Performed by: FAMILY MEDICINE

## 2019-09-18 RX ORDER — PREDNISONE 20 MG/1
40 TABLET ORAL DAILY
Qty: 10 TABLET | Refills: 0 | Status: SHIPPED | OUTPATIENT
Start: 2019-09-18 | End: 2019-09-23

## 2019-09-19 ENCOUNTER — APPOINTMENT (OUTPATIENT)
Dept: RADIOLOGY | Facility: MEDICAL CENTER | Age: 69
End: 2019-09-19
Payer: MEDICARE

## 2019-09-19 DIAGNOSIS — S89.92XA INJURY OF LEFT KNEE, INITIAL ENCOUNTER: ICD-10-CM

## 2019-09-19 PROCEDURE — 73564 X-RAY EXAM KNEE 4 OR MORE: CPT

## 2019-09-19 NOTE — PROGRESS NOTES
Assessment/Plan:    82 y/o woman with: Left knee injury and asthma  Discussed work-up and treatment options  Will check Xray and refer to PT and give steroid burst  Discussed heat and cold, stretching and bracing and advised to call back if not improving or worsening  No problem-specific Assessment & Plan notes found for this encounter  Diagnoses and all orders for this visit:    Injury of left knee, initial encounter  -     XR knee 4+ vw left injury; Future  -     Ambulatory referral to Physical Therapy; Future  -     predniSONE 20 mg tablet; Take 2 tablets (40 mg total) by mouth daily for 5 days    Uncomplicated asthma, unspecified asthma severity, unspecified whether persistent  -     predniSONE 20 mg tablet; Take 2 tablets (40 mg total) by mouth daily for 5 days          Subjective:     Chief Complaint   Patient presents with    Knee Pain     left knee    Immunizations     listed in         Patient ID: Topher Antunez is a 81 y o  female  Patient is a 82 y/o woman who presents c/o pain and swelling in left knee for a few weeks and some cough and wheezing  No fevers, chills, nausea or vomiting  The following portions of the patient's history were reviewed and updated as appropriate: allergies, current medications, past family history, past medical history, past social history, past surgical history and problem list     Review of Systems   Constitutional: Negative  HENT: Negative  Eyes: Negative  Respiratory: Positive for cough and wheezing  Cardiovascular: Negative  Gastrointestinal: Negative  Endocrine: Negative  Genitourinary: Negative  Musculoskeletal: Positive for arthralgias  Allergic/Immunologic: Negative  Neurological: Negative  Hematological: Negative  Psychiatric/Behavioral: Negative  All other systems reviewed and are negative          Objective:      /68 (BP Location: Left arm, Patient Position: Sitting, Cuff Size: Standard)   Temp 98 9 °F (37 2 °C) (Tympanic)   Ht 5' 4" (1 626 m)   Wt 56 2 kg (124 lb)   BMI 21 28 kg/m²          Physical Exam   Constitutional: She is oriented to person, place, and time  She appears well-developed and well-nourished  HENT:   Head: Atraumatic  Right Ear: External ear normal    Left Ear: External ear normal    Eyes: Pupils are equal, round, and reactive to light  Conjunctivae and EOM are normal    Neck: Normal range of motion  Cardiovascular: Normal rate, regular rhythm and normal heart sounds  Pulmonary/Chest: Effort normal  No respiratory distress  She has wheezes  Abdominal: Soft  She exhibits no distension  There is no tenderness  There is no rebound and no guarding  Musculoskeletal: Normal range of motion  Effusion and tenderness to left knee   Neurological: She is alert and oriented to person, place, and time  No cranial nerve deficit  Skin: Skin is warm and dry  Psychiatric: She has a normal mood and affect   Her behavior is normal  Judgment and thought content normal

## 2019-09-20 ENCOUNTER — TELEPHONE (OUTPATIENT)
Dept: FAMILY MEDICINE CLINIC | Facility: CLINIC | Age: 69
End: 2019-09-20

## 2019-09-20 NOTE — TELEPHONE ENCOUNTER
----- Message from Daisy Painting MD sent at 9/19/2019  3:42 PM EDT -----  Please call pt   To discuss that Xray shows arthritis but no fracture

## 2019-09-24 ENCOUNTER — TELEPHONE (OUTPATIENT)
Dept: FAMILY MEDICINE CLINIC | Facility: CLINIC | Age: 69
End: 2019-09-24

## 2019-09-24 DIAGNOSIS — J01.81 OTHER ACUTE RECURRENT SINUSITIS: Primary | ICD-10-CM

## 2019-09-24 NOTE — TELEPHONE ENCOUNTER
PT CALLED AND ISN'T FEELING ANY BETTER, SHE WOULD LIKE A REFILL ON HER MEDICATION FOR:  PREDNISONE 20 MG - 10 TABLETS    PATIENT WAS SEEN ON 09/18/19    PHARMACY: [facility] [address]

## 2019-09-25 RX ORDER — PREDNISONE 10 MG/1
TABLET ORAL
Qty: 20 TABLET | Refills: 0 | Status: SHIPPED | OUTPATIENT
Start: 2019-09-25 | End: 2019-10-31 | Stop reason: SDUPTHER

## 2019-09-26 NOTE — TELEPHONE ENCOUNTER
Spoke with pharmacy per Dr Rivka Carson to correct number of tablets to be ordered: 30 tablets instead of 20 tablets

## 2019-09-27 ENCOUNTER — EVALUATION (OUTPATIENT)
Dept: PHYSICAL THERAPY | Facility: CLINIC | Age: 69
End: 2019-09-27
Payer: MEDICARE

## 2019-09-27 DIAGNOSIS — M25.562 ACUTE PAIN OF LEFT KNEE: ICD-10-CM

## 2019-09-27 DIAGNOSIS — S89.92XA INJURY OF LEFT KNEE, INITIAL ENCOUNTER: Primary | ICD-10-CM

## 2019-09-27 PROCEDURE — 97110 THERAPEUTIC EXERCISES: CPT | Performed by: PHYSICAL THERAPIST

## 2019-09-27 PROCEDURE — 97161 PT EVAL LOW COMPLEX 20 MIN: CPT | Performed by: PHYSICAL THERAPIST

## 2019-09-27 NOTE — PROGRESS NOTES
PT Evaluation     Today's date: 2019  Patient name: Meenu Avila  : 1950  MRN: [id number]  Referring provider: Sarah Sherman MD  Dx:   Encounter Diagnosis     ICD-10-CM    1  Acute pain of left knee M25 562    2  Injury of left knee, initial encounter S89  92XA Ambulatory referral to Physical Therapy                  Assessment  Assessment details: Meenu Avila is a pleasant 81 y o  female who presents with signs and symptoms correlating with referring diagnosis  No further referral appears necessary at this time based upon examination results  The patient's greatest concerns are mowing the lawn, completing all ADLs around the house as she lives alone and continuing with everything without pain  She presents with a movement impairment diagnosis of patellar hypomobile lateral glide L side  This also presents with decreased strength, balance, functional tolearnce to walking more than 30 min outside and squatting more than 70 degrees  All of which is limiting her ability to complete functional independence as she lives alone at home  Negative prognostic indicators: none  Positive prognostic indicators: good motivation and compliance  Please contact me if you have any further questions or recommendations  Thank you very much for the kind referral       Impairments: abnormal or restricted ROM, abnormal movement, activity intolerance, impaired physical strength and pain with function    Symptom irritability: lowUnderstanding of Dx/Px/POC: excellent  Goals  STGs  1  Decrease pain by 20% in 2-4 weeks  2  Improve functional squat by 10 degrees  3  Improve hip and knee strength by 1/3 grade in 2-4 weeks  LTGs  1  Decrease pain by 60% in 6-8 weeks  2  Improve walking tolerance to >60 minutes in 6-8 weeks  3  Perform home activities like mowing lawn without pain in 6-8 weeks          Plan  Patient would benefit from: skilled physical therapy  Planned modality interventions: TENS, thermotherapy: hydrocollator packs and cryotherapy  Planned therapy interventions: manual therapy, therapeutic training, stretching, strengthening, therapeutic activities, therapeutic exercise, patient education and activity modification  Frequency: 2x week  Duration in weeks: 8  Treatment plan discussed with: patient        Subjective Evaluation    History of Present Illness  Mechanism of injury: trauma  Mechanism of injury: Pt is a 81 y o  female presenting w/ L knee pain with insidious onset with swelling  Pt states that the pain started 3 weeks ago with her knee swelling up like a balloon and since then has gone to chiropractic care where they did ultrasound and tens with some relief of symptoms, but continued to worry about the pain and went to PCP for further follow up  She was prescribed prednisone that has helped with the pain drastically and swelling at this time  She is feeling better at the moment than she did when she made the appointment but states she has not been completing her normal activities around the house that she used to do       Neurological signs: numbness and tingling in the R foot with saphenous   Red Flags: none  PMH: back surgery,  breast lumps           Not a recurrent problem   Quality of life: good    Pain  Current pain ratin  At best pain ratin  At worst pain rating: 10  Location: medial knee  Quality: sharp  Relieving factors: ice, rest, heat and medications  Aggravating factors: walking and standing  Progression: improved    Social Support  Stairs in house: yes (13)   Lives in: one-story house  Lives with: alone    Employment status: not working  Hand dominance: right      Diagnostic Tests  X-ray: abnormal (arthritic normal)  Treatments  Previous treatment: physical therapy (shoulder)  Patient Goals  Patient goals for therapy: improved balance, increased strength, decreased edema and decreased pain          Objective     Active Range of Motion   Left Knee   Flexion: 140 degrees Extension: 0 degrees   Extensor lag: WFL    Right Knee   Flexion: 135 degrees   Extension: 0 degrees   Extensor lag: WFL    Additional Active Range of Motion Details  Observation: valgus of B knees R>L    Gait: normal upright other than valgus at knees  Functional squat: pain at 70 degrees poor form knees over toes  Slump: + R side SLR: Cheryle Lamprey: - Faddir: -      Meniscal Pathology Composite Score:   Hx of locking/catching: + Joint line tenderness: - Pain with hyperextension: - Pain with hyperflexion:-  Ramonita:-    Lachmans: -Posterior drawer: -Anterior drawer:- MCL0:-  MCL30: - LCL0: -LCL30:-     STEPH: Pain during lateral glide of patella on L side normal mobility on R side      Strength/Myotome Testing     Left Hip   Planes of Motion   Flexion: 4  Extension: 3+  Abduction: 3+  External rotation: 4-  Internal rotation: 4-    Right Hip   Planes of Motion   Flexion: 4  Extension: 4-  Abduction: 3+  External rotation: 4-  Internal rotation: 4-    Left Knee   Flexion: 4+  Extension: 4+    Right Knee   Flexion: 4+  Extension: 4+    Left Ankle/Foot   Dorsiflexion: 4+  Plantar flexion: 4+  Great toe extension: 4+    Right Ankle/Foot   Dorsiflexion: 4+  Plantar flexion: 4+  Great toe extension: 4+      Flowsheet Rows      Most Recent Value   PT/OT G-Codes   Current Score  48   Projected Score  63             Precautions: none    Daily Treatment Diary     Date 9/27            FOTO IE             Re-eval IE              Manuals 9/27         MFD lateral/medial quad                                                   Exercise Diary          Bike          gastroc and soleus stretch standing 10x3         Hip abd standing          Hip ext standing          SLS foam          Side step TB          TKE          LAQ           SLR flex          Bridge          Clam with TB                                                                                                              Modalities

## 2019-10-01 ENCOUNTER — OFFICE VISIT (OUTPATIENT)
Dept: PHYSICAL THERAPY | Facility: CLINIC | Age: 69
End: 2019-10-01
Payer: MEDICARE

## 2019-10-01 DIAGNOSIS — S89.92XA INJURY OF LEFT KNEE, INITIAL ENCOUNTER: Primary | ICD-10-CM

## 2019-10-01 DIAGNOSIS — M25.562 ACUTE PAIN OF LEFT KNEE: ICD-10-CM

## 2019-10-01 PROCEDURE — 97112 NEUROMUSCULAR REEDUCATION: CPT

## 2019-10-01 PROCEDURE — 97110 THERAPEUTIC EXERCISES: CPT

## 2019-10-01 NOTE — PROGRESS NOTES
Daily Note     Today's date: 10/1/2019  Patient name: Baljinder Ariza  : 1950  MRN: [id number]  Referring provider: Kris Stephens MD  Dx:   Encounter Diagnosis     ICD-10-CM    1  Injury of left knee, initial encounter S89  92XA    2  Acute pain of left knee M25 562                   Subjective: Pt presents to PT reporting "severe muscle cramp" in posterior R LE in the early morning  Pt offers no new complaints of pain in L knee  Pt denies pain post PT session  Objective: See treatment diary below      Assessment: Tolerated treatment well  Performed long sit soleus stretch in long sit for improved technique and demonstrated long sit gastroc stretch for HEP  Added hamstring stretch for c/o cramping in R LE  Issued pt hamstring stretch to HEP  Patient demonstrated fatigue post treatment, exhibited good technique with therapeutic exercises and would benefit from continued PT to increase strength and ROM in L LE  Plan: Continue per plan of care        Precautions: none    Daily Treatment Diary     Date 9/27 10/1           FOTO IE             Re-eval IE              Manuals 9/27 10/1           MFD lateral/medial quad                                                                  Exercise Diary  10/1           Bike  5'           gastroc and soleus stretch standing 10x3 20" x 5 ea, soleus long sit           Hip abd standing  15x ea OTB          Hip ext standing  15x ea OTB          SLS foam  grn  20" x 3           Side step TB  grn 3 laps           TKE             LAQ              SLR flex  15x            Bridge  15x           Clam with TB             Hamstring stretch  20" x 5                                                                                                                                Modalities

## 2019-10-02 ENCOUNTER — OFFICE VISIT (OUTPATIENT)
Dept: FAMILY MEDICINE CLINIC | Facility: CLINIC | Age: 69
End: 2019-10-02
Payer: MEDICARE

## 2019-10-02 VITALS
BODY MASS INDEX: 21 KG/M2 | SYSTOLIC BLOOD PRESSURE: 130 MMHG | HEART RATE: 89 BPM | DIASTOLIC BLOOD PRESSURE: 70 MMHG | WEIGHT: 123 LBS | HEIGHT: 64 IN

## 2019-10-02 DIAGNOSIS — S89.92XA INJURY OF LEFT KNEE, INITIAL ENCOUNTER: ICD-10-CM

## 2019-10-02 DIAGNOSIS — E55.9 VITAMIN D DEFICIENCY: ICD-10-CM

## 2019-10-02 DIAGNOSIS — E78.5 HYPERLIPIDEMIA, UNSPECIFIED HYPERLIPIDEMIA TYPE: ICD-10-CM

## 2019-10-02 DIAGNOSIS — Z12.83 SKIN CANCER SCREENING: Primary | ICD-10-CM

## 2019-10-02 PROCEDURE — 99214 OFFICE O/P EST MOD 30 MIN: CPT | Performed by: FAMILY MEDICINE

## 2019-10-02 NOTE — PROGRESS NOTES
Assessment/Plan:    73-year-old woman with:  Hyperlipidemia, left knee pain and vitamin-D deficiency  Discussed workup and treatment options with risks and benefits  Discussed heat and cold stretching and anti-inflammatories and will refer to nutritionist per her request and encouraged her to continue her therapy advised to call back if not improving or worsening  Patient also requests her referral to Dermatology for yearly skin screening exam     No problem-specific Assessment & Plan notes found for this encounter  Diagnoses and all orders for this visit:    Skin cancer screening  -     Ambulatory referral to Dermatology; Future    Hyperlipidemia, unspecified hyperlipidemia type  -     Ambulatory referral to Nutrition Services; Future    Injury of left knee, initial encounter    Vitamin D deficiency          Subjective:     Chief Complaint   Patient presents with    Follow-up     pt presents for a 6m f/u  pt reports doing well over all   Knee Pain     pt reprots left knee pain has gotten better since 4/2019    Labs Only     labs doen 4/16/2019  pt wants to belia somethings  Patient ID: Natali Burton is a 81 y o  female  Patient is a 73-year-old woman who presents for follow-up on hyperlipidemia, left knee pain and vitamin-D deficiency  She admits significant improvement on her current regimen with therapy and she denies other acute complaints  She would like to see a nutritionist for her cholesterol  No fevers chills nausea vomiting  The following portions of the patient's history were reviewed and updated as appropriate: allergies, current medications, past family history, past medical history, past social history, past surgical history and problem list     Review of Systems   Constitutional: Negative  HENT: Negative  Eyes: Negative  Respiratory: Negative  Cardiovascular: Negative  Gastrointestinal: Negative  Endocrine: Negative  Genitourinary: Negative  Musculoskeletal: Positive for arthralgias  Allergic/Immunologic: Negative  Neurological: Negative  Hematological: Negative  Psychiatric/Behavioral: Negative  All other systems reviewed and are negative  Objective:      /70 (BP Location: Right arm, Patient Position: Sitting, Cuff Size: Standard)   Pulse 89   Ht 5' 4" (1 626 m)   Wt 55 8 kg (123 lb)   LMP  (LMP Unknown)   BMI 21 11 kg/m²          Physical Exam   Constitutional: She is oriented to person, place, and time  She appears well-developed and well-nourished  HENT:   Head: Atraumatic  Right Ear: External ear normal    Left Ear: External ear normal    Eyes: Pupils are equal, round, and reactive to light  Conjunctivae and EOM are normal    Neck: Normal range of motion  Cardiovascular: Normal rate, regular rhythm and normal heart sounds  Pulmonary/Chest: Effort normal and breath sounds normal  No respiratory distress  Abdominal: Soft  She exhibits no distension  There is no tenderness  There is no rebound and no guarding  Musculoskeletal: Normal range of motion  Neurological: She is alert and oriented to person, place, and time  No cranial nerve deficit  Skin: Skin is warm and dry  Psychiatric: She has a normal mood and affect   Her behavior is normal  Judgment and thought content normal

## 2019-10-03 ENCOUNTER — OFFICE VISIT (OUTPATIENT)
Dept: PHYSICAL THERAPY | Facility: CLINIC | Age: 69
End: 2019-10-03
Payer: MEDICARE

## 2019-10-03 DIAGNOSIS — M25.562 ACUTE PAIN OF LEFT KNEE: ICD-10-CM

## 2019-10-03 DIAGNOSIS — S89.92XA INJURY OF LEFT KNEE, INITIAL ENCOUNTER: Primary | ICD-10-CM

## 2019-10-03 PROCEDURE — 97112 NEUROMUSCULAR REEDUCATION: CPT

## 2019-10-03 PROCEDURE — 97110 THERAPEUTIC EXERCISES: CPT

## 2019-10-03 NOTE — PROGRESS NOTES
Daily Note     Today's date: 10/3/2019  Patient name: Marianna Hatchet  : 1950  MRN: [id number]  Referring provider: Washington Dunn MD  Dx:   Encounter Diagnosis     ICD-10-CM    1  Injury of left knee, initial encounter S89  92XA    2  Acute pain of left knee M25 562                   Subjective: Pt presents to PT reports she walked 1 25 miles with less pain after last session stating she was unable to finish her walk prior to PT sessions secondary to pain  Pt reports "I feel better now than when I came in" post PT session  Objective: See treatment diary below      Assessment: Tolerated treatment well noting appropriate challenge with progressions  Patient demonstrated fatigue post treatment, exhibited good technique with therapeutic exercises and would benefit from continued PT to increase strength and flexibility in L LE  Plan: Continue per plan of care  Asked pt to wear or bring shorts NV to perform MFD        Precautions: none    Daily Treatment Diary     Date 9/27 10/1 10/3          FOTO IE             Re-eval IE              Manuals 9/27 10/1 10/3          MFD lateral/medial quad                                                                  Exercise Diary  10/1 10/3          Bike  5' 5'          gastroc and soleus stretch standing 10x3 20" x 5 ea, soleus long sit 20" x 5 ea, soleus long sit          Hip abd standing  15x ea OTB  15x ea          Hip ext standing  15x ea OTB  15x ea          SLS foam  grn  20" x 3 grn  20" x 3          Side step TB  grn 3 laps grn 4 laps          TKE   GTB  5" x 15          LAQ    5" x 15          SLR flex  15x  20x          Bridge  15x 20x          Clam with TB   GTB  3" x 15          Hamstring stretch  20" x 5 20" x 5                                                                                                                               Modalities

## 2019-10-08 ENCOUNTER — OFFICE VISIT (OUTPATIENT)
Dept: PHYSICAL THERAPY | Facility: CLINIC | Age: 69
End: 2019-10-08
Payer: MEDICARE

## 2019-10-08 DIAGNOSIS — M25.562 ACUTE PAIN OF LEFT KNEE: ICD-10-CM

## 2019-10-08 DIAGNOSIS — S89.92XA INJURY OF LEFT KNEE, INITIAL ENCOUNTER: Primary | ICD-10-CM

## 2019-10-08 PROCEDURE — 97110 THERAPEUTIC EXERCISES: CPT

## 2019-10-08 PROCEDURE — 97530 THERAPEUTIC ACTIVITIES: CPT

## 2019-10-08 PROCEDURE — 97112 NEUROMUSCULAR REEDUCATION: CPT

## 2019-10-08 NOTE — PROGRESS NOTES
Daily Note     Today's date: 10/8/2019  Patient name: Jessica Mckeon  : 1950  MRN: [id number]  Referring provider: Donnell Cutler MD  Dx:   Encounter Diagnosis     ICD-10-CM    1  Injury of left knee, initial encounter S89  92XA    2  Acute pain of left knee M25 562                   Subjective: Pt c/o "stiffness" only today but reports pain "on and off"  She states it does not keep her from doing daily activities  Objective: See treatment diary below      Assessment: Tolerated treatment well  Reviewed and issued pt an updated HEP with orange and green theraband  Pt demonstrates good tolerance to progressions of increased weight and resistance  Patient demonstrated fatigue post treatment, exhibited good technique with therapeutic exercises and would benefit from continued PT to increase L LE strength  Plan: Continue per plan of care        Precautions: none    Daily Treatment Diary     Date 9/27 10/1 10/3 10/8         FOTO IE             Re-eval IE              Manuals 9/27 10/1 10/3 10/8         MFD lateral/medial quad    nv                                                              Exercise Diary  10/1 10/3 10/8         Bike  5' 5' 5'         gastroc and soleus stretch standing 10x3 20" x 5 ea, soleus long sit 20" x 5 ea, soleus long sit 30" x 3 ea, soleus long sit         Hip abd standing  15x ea OTB  15x ea OTB  15x ea           Hip ext standing  15x ea OTB  15x ea OTB  15x ea         SLS foam  grn  20" x 3 grn  20" x 3 grn  30" x 3         Side step TB  grn 3 laps grn 4 laps grn 4 laps         TKE   GTB  5" x 15 GTB  5" x 15         LAQ    5" x 15 2# 5" x 15         SLR flex  15x  20x 1# x 15         Bridge  15x 20x 20x         Clam with TB   GTB  3" x 15 GTB  3" x 15         Hamstring stretch  20" x 5 20" x 5 nv                                                                                                                              Modalities

## 2019-10-10 ENCOUNTER — OFFICE VISIT (OUTPATIENT)
Dept: PHYSICAL THERAPY | Facility: CLINIC | Age: 69
End: 2019-10-10
Payer: MEDICARE

## 2019-10-10 DIAGNOSIS — S89.92XA INJURY OF LEFT KNEE, INITIAL ENCOUNTER: Primary | ICD-10-CM

## 2019-10-10 DIAGNOSIS — M25.562 ACUTE PAIN OF LEFT KNEE: ICD-10-CM

## 2019-10-10 PROCEDURE — 97530 THERAPEUTIC ACTIVITIES: CPT

## 2019-10-10 PROCEDURE — 97110 THERAPEUTIC EXERCISES: CPT

## 2019-10-10 NOTE — PROGRESS NOTES
Daily Note     Today's date: 10/10/2019  Patient name: Jaiden Huynh  : 1950  MRN: [id number]  Referring provider: Katrina Kamara MD  Dx:   Encounter Diagnosis     ICD-10-CM    1  Injury of left knee, initial encounter S89  92XA    2  Acute pain of left knee M25 562                   Subjective: Pt presents to PT reporting dull pain at inferior L patella that began last night for unknown reason  Pt denies increased pain post PT session  Objective: See treatment diary below      Assessment: Attempted TM today however pt reports increased pain in L inferior patella  Added SLR ext and abd to increase HS and glute strength; also added these to her Hep  Pt does required cues for correct technique for clamshells  Patient demonstrated fatigue post treatment and would benefit from continued PT to increase strength and flexibility  Plan: Continue per plan of care        Precautions: none    Daily Treatment Diary     Date 9/27 10/1 10/3 10/8 10/10        FOTO IE     NV        Re-eval IE              Manuals 9/27 10/1 10/3 10/8 10/10        MFD lateral/medial quad    nv                                                              Exercise Diary  10/1 10/3 10/8 10/10        Bike  5' 5' 5' 5'        TM when able     attempted        gastroc and soleus stretch standing 10x3 20" x 5 ea, soleus long sit 20" x 5 ea, soleus long sit 30" x 3 ea, soleus long sit 30" x 3 ea, soleus long sit        Hip abd standing  15x ea OTB  15x ea OTB  15x ea           Hip ext standing  15x ea OTB  15x ea OTB  15x ea         SLS foam  grn  20" x 3 grn  20" x 3 grn  30" x 3         Side step TB  grn 3 laps grn 4 laps grn 4 laps         TKE   GTB  5" x 15 GTB  5" x 15         LAQ    5" x 15 2# 5" x 15 HOLD        SLR flex  15x  20x 1# x 15 0# x 15        SLR ext & abd    15x 15x        Bridge  15x 20x 20x         Clam with TB   GTB  3" x 15 GTB  3" x 15 GTB  3" x 15        Hamstring stretch  20" x 5 20" x 5 nv 30" x 3        Lunges Step downs                                                                                                        Modalities

## 2019-10-15 ENCOUNTER — OFFICE VISIT (OUTPATIENT)
Dept: PHYSICAL THERAPY | Facility: CLINIC | Age: 69
End: 2019-10-15
Payer: MEDICARE

## 2019-10-15 DIAGNOSIS — M25.562 ACUTE PAIN OF LEFT KNEE: ICD-10-CM

## 2019-10-15 DIAGNOSIS — S89.92XA INJURY OF LEFT KNEE, INITIAL ENCOUNTER: Primary | ICD-10-CM

## 2019-10-15 PROCEDURE — 97110 THERAPEUTIC EXERCISES: CPT | Performed by: PHYSICAL THERAPIST

## 2019-10-15 PROCEDURE — 97112 NEUROMUSCULAR REEDUCATION: CPT | Performed by: PHYSICAL THERAPIST

## 2019-10-15 NOTE — PROGRESS NOTES
Daily Note     Today's date: 10/15/2019  Patient name: Saud Fu  : 1950  MRN: [id number]  Referring provider: Cee Downing MD  Dx:   Encounter Diagnosis     ICD-10-CM    1  Injury of left knee, initial encounter S89  92XA    2  Acute pain of left knee M25 562                   Subjective: Pt states she is doing better today and since last session she was able to walk 2 more times with reduced pain compared to last session  Pt is completing interventions at home with no questions at this time  Objective: See treatment diary below      Assessment: Tolerated treatment well  Patient exhibited good technique with therapeutic exercises, but demonstrated decreased ROM during quad stretch on R side  Was given updated HEP  Plan: Continue per plan of care        Precautions: none    Daily Treatment Diary     Date 9/27 10/1 10/3 10/8 10/10 10/15       FOTO IE     NV xx       Re-eval IE              Manuals 9/27 10/1 10/3 10/8 10/10        MFD lateral/medial quad    nv                                                              Exercise Diary  10/1 10/3 10/8 10/10 10/15       Bike  5' 5' 5' 5' elliptical 5'        TM when able     attempted        gastroc and soleus stretch standing 10x3 20" x 5 ea, soleus long sit 20" x 5 ea, soleus long sit 30" x 3 ea, soleus long sit 30" x 3 ea, soleus long sit Prostretch 20"x5       Hip abd standing  15x ea OTB  15x ea OTB  15x ea    HEP       Hip ext standing  15x ea OTB  15x ea OTB  15x ea  HEP       SLS foam  grn  20" x 3 grn  20" x 3 grn  30" x 3  Blue 20"x3       Side step TB  grn 3 laps grn 4 laps grn 4 laps  GTB 4 laps       TKE   GTB  5" x 15 GTB  5" x 15  DC       LAQ    5" x 15 2# 5" x 15 HOLD        SLR flex  15x  20x 1# x 15 0# x 15        SLR ext & abd    15x 15x Ext 15x       Bridge  15x 20x 20x         Clam with TB   GTB  3" x 15 GTB  3" x 15 GTB  3" x 15 GTB 3"x15       Hamstring stretch  20" x 5 20" x 5 nv 30" x 3        Lunges      10x B Step downs      +1 6x Pain 10x R       Quad stretch      5x20"                                                                                      Modalities

## 2019-10-16 ENCOUNTER — OFFICE VISIT (OUTPATIENT)
Dept: FAMILY MEDICINE CLINIC | Facility: CLINIC | Age: 69
End: 2019-10-16
Payer: MEDICARE

## 2019-10-16 VITALS
BODY MASS INDEX: 20.83 KG/M2 | SYSTOLIC BLOOD PRESSURE: 118 MMHG | WEIGHT: 122 LBS | HEIGHT: 64 IN | TEMPERATURE: 97.2 F | DIASTOLIC BLOOD PRESSURE: 72 MMHG

## 2019-10-16 DIAGNOSIS — Z23 NEED FOR IMMUNIZATION AGAINST INFLUENZA: ICD-10-CM

## 2019-10-16 DIAGNOSIS — J11.1 FLU: Primary | ICD-10-CM

## 2019-10-16 DIAGNOSIS — M25.512 CHRONIC LEFT SHOULDER PAIN: ICD-10-CM

## 2019-10-16 DIAGNOSIS — G89.29 CHRONIC LEFT SHOULDER PAIN: ICD-10-CM

## 2019-10-16 PROCEDURE — 90662 IIV NO PRSV INCREASED AG IM: CPT

## 2019-10-16 PROCEDURE — G0008 ADMIN INFLUENZA VIRUS VAC: HCPCS

## 2019-10-16 PROCEDURE — 99213 OFFICE O/P EST LOW 20 MIN: CPT | Performed by: FAMILY MEDICINE

## 2019-10-16 RX ORDER — METHOCARBAMOL 500 MG/1
500 TABLET, FILM COATED ORAL
Qty: 30 TABLET | Refills: 0 | Status: SHIPPED | OUTPATIENT
Start: 2019-10-16 | End: 2020-04-15

## 2019-10-17 ENCOUNTER — OFFICE VISIT (OUTPATIENT)
Dept: PHYSICAL THERAPY | Facility: CLINIC | Age: 69
End: 2019-10-17
Payer: MEDICARE

## 2019-10-17 DIAGNOSIS — S89.92XA INJURY OF LEFT KNEE, INITIAL ENCOUNTER: Primary | ICD-10-CM

## 2019-10-17 DIAGNOSIS — M25.562 ACUTE PAIN OF LEFT KNEE: ICD-10-CM

## 2019-10-17 PROCEDURE — 97140 MANUAL THERAPY 1/> REGIONS: CPT

## 2019-10-17 PROCEDURE — 97110 THERAPEUTIC EXERCISES: CPT

## 2019-10-17 PROCEDURE — 97112 NEUROMUSCULAR REEDUCATION: CPT

## 2019-10-17 NOTE — PROGRESS NOTES
Daily Note     Today's date: 10/17/2019  Patient name: Baljinder Ariza  : 1950  MRN: [id number]  Referring provider: Kris Stephens MD  Dx:   Encounter Diagnosis     ICD-10-CM    1  Injury of left knee, initial encounter S89  92XA    2  Acute pain of left knee M25 562                   Subjective:  Pt denies pain in L knee today  She does reports L shoulder soreness secondary to previous issues with tendonitis  Pt denies pain in L knee post PT session  Objective: See treatment diary below      Assessment: Tolerated treatment well  Pt advised to use stationary handle on elliptical secondary to shoulder pain  Patient demonstrated fatigue post treatment, exhibited good technique with therapeutic exercises and would benefit from continued PT      Plan: Continue per plan of care        Precautions: none    Daily Treatment Diary     Date 9/27 10/1 10/3 10/8 10/10 10/15 10/17      FOTO IE     NV xx       Re-eval IE              Manuals 9/27 10/1 10/3 10/8 10/10  10/17      MFD lateral/medial quad    nv   PK                                                           Exercise Diary  10/1 10/3 10/8 10/10 10/15 10/17      Bike  5' 5' 5' 5' elliptical 5'  elliptical 5'       TM when able     attempted        gastroc and soleus stretch standing 10x3 20" x 5 ea, soleus long sit 20" x 5 ea, soleus long sit 30" x 3 ea, soleus long sit 30" x 3 ea, soleus long sit Prostretch 20"x5 Prostretch 20"x5      Hip abd standing  15x ea OTB  15x ea OTB  15x ea    HEP -- -- -- --   Hip ext standing  15x ea OTB  15x ea OTB  15x ea  HEP -- -- -- --   SLS foam  grn  20" x 3 grn  20" x 3 grn  30" x 3  Blue 20"x3 Blue 20"x3      Side step TB  grn 3 laps grn 4 laps grn 4 laps  GTB 4 laps GTB 4 laps      TKE   GTB  5" x 15 GTB  5" x 15  DC -- -- -- --   LAQ    5" x 15 2# 5" x 15 HOLD -- 5" x 15      SLR flex  15x  20x 1# x 15 0# x 15  1# x 20      SLR ext & abd    15x 15x Ext 15x 1# x 20 ea      Bridge  15x 20x 20x         Clam with TB GTB  3" x 15 GTB  3" x 15 GTB  3" x 15 GTB 3"x15 nv      Hamstring stretch  20" x 5 20" x 5 nv 30" x 3  nv      Lunges      10x B  10x B       Step downs      +1 6x Pain 10x R np      Quad stretch      5x20"  5x20"                                                                                     Modalities

## 2019-10-17 NOTE — PROGRESS NOTES
Assessment/Plan:    53-year-old woman with:  Chronic left shoulder pain worsening  Patient failed supportive care and greater than 4 weeks of therapy so will refer to Orthopedic surgery and discuss that patient may be recommended for MR arthrogram as previously recommended by her MRI last year  However she prefers to discuss this with her surgeon 1st   Will give Robaxin for breakthrough symptoms at bedtime and discussed other supportive care return parameters  No problem-specific Assessment & Plan notes found for this encounter  Diagnoses and all orders for this visit:    Flu  -     Cancel: influenza vaccine, 1490-4092, high-dose, PF 0 5 mL (FLUZONE HIGH-DOSE)    Chronic left shoulder pain  -     Ambulatory referral to Orthopedic Surgery; Future  -     methocarbamol (ROBAXIN) 500 mg tablet; Take 1 tablet (500 mg total) by mouth daily at bedtime as needed for muscle spasms    Need for immunization against influenza  -     FLUZONE HIGH-DOSE: influenza vaccine, high-dose, preservative-free 0 5 mL          Subjective:     Chief Complaint   Patient presents with    Shoulder Pain     left shoulder    Flu Vaccine     patient had an reqction to HD last year,but wants lower dosage    Insect Bite     under left arm pit  Patient ID: Rob Payne is a 81 y o  female  Patient is a 53-year-old woman who presents for follow-up on left shoulder pain  She admits she has been doing physical therapy with supportive care and that her symptoms are not improving at all  She has a history of possible labral tear on MRI seen a year ago along with rotator cuff syndrome  Patient never went for follow-up MR arthrogram and did not follow up with Ortho since then  She admits that her symptoms have gotten worse since then        The following portions of the patient's history were reviewed and updated as appropriate: allergies, current medications, past family history, past medical history, past social history, past surgical history and problem list     Review of Systems   Constitutional: Negative  HENT: Negative  Eyes: Negative  Respiratory: Negative  Cardiovascular: Negative  Gastrointestinal: Negative  Endocrine: Negative  Genitourinary: Negative  Musculoskeletal: Positive for arthralgias  Allergic/Immunologic: Negative  Neurological: Negative  Hematological: Negative  Psychiatric/Behavioral: Negative  All other systems reviewed and are negative  Objective:      /72 (BP Location: Left arm, Patient Position: Sitting, Cuff Size: Standard)   Temp (!) 97 2 °F (36 2 °C) (Tympanic)   Ht 5' 4" (1 626 m)   Wt 55 3 kg (122 lb)   LMP  (LMP Unknown)   BMI 20 94 kg/m²          Physical Exam   Constitutional: She is oriented to person, place, and time  She appears well-developed and well-nourished  HENT:   Head: Atraumatic  Right Ear: External ear normal    Left Ear: External ear normal    Eyes: Pupils are equal, round, and reactive to light  Conjunctivae and EOM are normal    Neck: Normal range of motion  Cardiovascular: Normal rate, regular rhythm and normal heart sounds  Pulmonary/Chest: Effort normal and breath sounds normal  No respiratory distress  Abdominal: Soft  She exhibits no distension  There is no tenderness  There is no rebound and no guarding  Musculoskeletal: Normal range of motion  Decreased abduction and external rotation of the left shoulder  Positive empty can and Gilmore testing  Neurological: She is alert and oriented to person, place, and time  No cranial nerve deficit  Skin: Skin is warm and dry  Psychiatric: She has a normal mood and affect   Her behavior is normal  Judgment and thought content normal

## 2019-10-22 ENCOUNTER — APPOINTMENT (OUTPATIENT)
Dept: PHYSICAL THERAPY | Facility: CLINIC | Age: 69
End: 2019-10-22
Payer: MEDICARE

## 2019-10-24 ENCOUNTER — OFFICE VISIT (OUTPATIENT)
Dept: PHYSICAL THERAPY | Facility: CLINIC | Age: 69
End: 2019-10-24
Payer: MEDICARE

## 2019-10-24 DIAGNOSIS — M25.562 ACUTE PAIN OF LEFT KNEE: ICD-10-CM

## 2019-10-24 DIAGNOSIS — S89.92XA INJURY OF LEFT KNEE, INITIAL ENCOUNTER: Primary | ICD-10-CM

## 2019-10-24 PROCEDURE — 97112 NEUROMUSCULAR REEDUCATION: CPT

## 2019-10-24 PROCEDURE — 97110 THERAPEUTIC EXERCISES: CPT

## 2019-10-24 NOTE — PROGRESS NOTES
Daily Note     Today's date: 10/24/2019  Patient name: Sonja Paris  : 1950  MRN: [id number]  Referring provider: Ju Virk MD  Dx:   Encounter Diagnosis     ICD-10-CM    1  Injury of left knee, initial encounter S89  92XA    2  Acute pain of left knee M25 562                   Subjective:  Pt denies pain in L knee today  She does report she mowed lawn taking about 1 1/2, going up ladder to clean the gutters and taking her daily walk which is about 1 1/4 mile  Pt denies pain post PT session  Objective: See treatment diary below      Assessment: Tolerated treatment well with occ cuing for correct technique  Pt requested bike secondary to having a cold recently  Patient demonstrated fatigue post treatment, exhibited good technique with therapeutic exercises and would benefit from continued PT to increase strength and flexibility  Plan: Continue per plan of care        Precautions: none    Daily Treatment Diary     Date 9/27 10/1 10/3 10/8 10/10 10/15 10/17 10/24     FOTO IE     NV xx       Re-eval IE              Manuals 9/27 10/1 10/3 10/8 10/10  10/17 10/24     MFD lateral/medial quad    nv   PK nv                                                          Exercise Diary  10/1 10/3 10/8 10/10 10/15 10/17 10/24     Bike  5' 5' 5' 5' elliptical 5'  elliptical 5'  Bike 7'     TM when able     attempted        gastroc and soleus stretch standing 10x3 20" x 5 ea, soleus long sit 20" x 5 ea, soleus long sit 30" x 3 ea, soleus long sit 30" x 3 ea, soleus long sit Prostretch 20"x5 Prostretch 20"x5 Prostretch 30"x3     Hip abd standing  15x ea OTB  15x ea OTB  15x ea    HEP -- -- -- --   Hip ext standing  15x ea OTB  15x ea OTB  15x ea  HEP -- -- -- --   SLS foam  grn  20" x 3 grn  20" x 3 grn  30" x 3  Blue 20"x3 Blue 20"x3 1' blue x 1; blk 30" x 2     Side step TB  grn 3 laps grn 4 laps grn 4 laps  GTB 4 laps GTB 4 laps GTB 4 laps     TKE   GTB  5" x 15 GTB  5" x 15  DC -- -- -- --   LAROCK    5" x 15 2# 5" x 15 HOLD -- 5" x 15 1', 5" x 15     SLR flex  15x  20x 1# x 15 0# x 15  1# x 20 1# x 20     SLR ext & abd    15x 15x Ext 15x 1# x 20 ea 1# x 20 ea     Bridge  15x 20x 20x         Clam with TB   GTB  3" x 15 GTB  3" x 15 GTB  3" x 15 GTB 3"x15 nv GTB 3"x15     Hamstring stretch  20" x 5 20" x 5 nv 30" x 3  nv 30" x 3     Lunges      10x B  10x B  10x ea B     Step downs      +1 6x Pain 10x R np      Quad stretch      5x20"  5x20"  30" x 3     Tandem AMB foam beam        3 laps     Side step foam        3 laps                                                         Modalities

## 2019-10-25 ENCOUNTER — OFFICE VISIT (OUTPATIENT)
Dept: FAMILY MEDICINE CLINIC | Facility: CLINIC | Age: 69
End: 2019-10-25
Payer: MEDICARE

## 2019-10-25 VITALS
TEMPERATURE: 98.8 F | SYSTOLIC BLOOD PRESSURE: 122 MMHG | WEIGHT: 122 LBS | HEIGHT: 64 IN | DIASTOLIC BLOOD PRESSURE: 72 MMHG | BODY MASS INDEX: 20.83 KG/M2

## 2019-10-25 DIAGNOSIS — J45.909 UNCOMPLICATED ASTHMA, UNSPECIFIED ASTHMA SEVERITY, UNSPECIFIED WHETHER PERSISTENT: ICD-10-CM

## 2019-10-25 DIAGNOSIS — J06.9 ACUTE UPPER RESPIRATORY INFECTION: Primary | ICD-10-CM

## 2019-10-25 PROCEDURE — 99213 OFFICE O/P EST LOW 20 MIN: CPT | Performed by: FAMILY MEDICINE

## 2019-10-25 RX ORDER — PREDNISONE 20 MG/1
40 TABLET ORAL DAILY
Qty: 10 TABLET | Refills: 0 | Status: SHIPPED | OUTPATIENT
Start: 2019-10-25 | End: 2020-04-15

## 2019-10-25 RX ORDER — AZITHROMYCIN 250 MG/1
TABLET, FILM COATED ORAL
Qty: 6 TABLET | Refills: 0 | Status: SHIPPED | OUTPATIENT
Start: 2019-10-25 | End: 2019-10-30

## 2019-10-25 RX ORDER — FLUTICASONE PROPIONATE 50 MCG
2 SPRAY, SUSPENSION (ML) NASAL DAILY
Qty: 16 G | Refills: 0 | Status: SHIPPED | OUTPATIENT
Start: 2019-10-25 | End: 2020-01-08 | Stop reason: SDUPTHER

## 2019-10-25 RX ORDER — FLUTICASONE PROPIONATE 220 UG/1
2 AEROSOL, METERED RESPIRATORY (INHALATION) 2 TIMES DAILY
Qty: 3 INHALER | Refills: 3 | Status: SHIPPED | OUTPATIENT
Start: 2019-10-25 | End: 2020-11-06

## 2019-10-25 NOTE — PROGRESS NOTES
Assessment/Plan:    82-year-old woman with:  Acute URI and asthma exacerbation  Discussed supportive care return parameters  Will give Z-Jayden along with Flonase restart her Flovent and steroid burst   Advised to call back if not improving worsening    No problem-specific Assessment & Plan notes found for this encounter  Diagnoses and all orders for this visit:    Acute upper respiratory infection  -     azithromycin (ZITHROMAX) 250 mg tablet; Take 2 tablets (500 mg total) by mouth daily for 1 day, THEN 1 tablet (250 mg total) daily for 4 days  -     fluticasone (FLONASE) 50 mcg/act nasal spray; 2 sprays into each nostril daily  -     fluticasone (FLOVENT HFA) 220 mcg/act inhaler; Inhale 2 puffs 2 (two) times a day Rinse mouth after use  -     predniSONE 20 mg tablet; Take 2 tablets (40 mg total) by mouth daily    Uncomplicated asthma, unspecified asthma severity, unspecified whether persistent  -     azithromycin (ZITHROMAX) 250 mg tablet; Take 2 tablets (500 mg total) by mouth daily for 1 day, THEN 1 tablet (250 mg total) daily for 4 days  -     fluticasone (FLONASE) 50 mcg/act nasal spray; 2 sprays into each nostril daily  -     fluticasone (FLOVENT HFA) 220 mcg/act inhaler; Inhale 2 puffs 2 (two) times a day Rinse mouth after use  -     predniSONE 20 mg tablet; Take 2 tablets (40 mg total) by mouth daily          Subjective:     Chief Complaint   Patient presents with    Cold Like Symptoms     Patient reports 9 days of cold and cough symptoms   Medication Refill     [facility] on [facility] for today's visit  Patient ID: Saud Fu is a 81 y o  female  Patient is a 82-year-old woman who presents she is complaining of cough congestion wheezing and tightness in her chest over the past week or so  Some fevers and chills, no nausea or vomiting  Tolerating p o  Intake        The following portions of the patient's history were reviewed and updated as appropriate: allergies, current medications, past family history, past medical history, past social history, past surgical history and problem list     Review of Systems   Constitutional: Positive for chills and fever  HENT: Positive for congestion, sinus pressure and sore throat  Eyes: Negative  Respiratory: Positive for cough and wheezing  Cardiovascular: Negative  Gastrointestinal: Negative  Endocrine: Negative  Genitourinary: Negative  Musculoskeletal: Negative  Allergic/Immunologic: Negative  Neurological: Negative  Hematological: Negative  Psychiatric/Behavioral: Negative  All other systems reviewed and are negative  Objective:      /72 (BP Location: Left arm)   Temp 98 8 °F (37 1 °C)   Ht 5' 4" (1 626 m)   Wt 55 3 kg (122 lb)   LMP  (LMP Unknown)   BMI 20 94 kg/m²          Physical Exam   Constitutional: She is oriented to person, place, and time  She appears well-developed and well-nourished  HENT:   Head: Atraumatic  Right Ear: External ear normal    Left Ear: External ear normal    Eyes: Pupils are equal, round, and reactive to light  Conjunctivae and EOM are normal    Neck: Normal range of motion  Cardiovascular: Normal rate, regular rhythm and normal heart sounds  Pulmonary/Chest: Effort normal  No respiratory distress  She has wheezes  Abdominal: Soft  She exhibits no distension  There is no tenderness  There is no rebound and no guarding  Musculoskeletal: Normal range of motion  Neurological: She is alert and oriented to person, place, and time  No cranial nerve deficit  Skin: Skin is warm and dry  Psychiatric: She has a normal mood and affect   Her behavior is normal  Judgment and thought content normal

## 2019-10-29 ENCOUNTER — APPOINTMENT (OUTPATIENT)
Dept: RADIOLOGY | Facility: MEDICAL CENTER | Age: 69
End: 2019-10-29
Payer: MEDICARE

## 2019-10-29 ENCOUNTER — OFFICE VISIT (OUTPATIENT)
Dept: OBGYN CLINIC | Facility: MEDICAL CENTER | Age: 69
End: 2019-10-29
Payer: MEDICARE

## 2019-10-29 ENCOUNTER — EVALUATION (OUTPATIENT)
Dept: PHYSICAL THERAPY | Facility: CLINIC | Age: 69
End: 2019-10-29
Payer: MEDICARE

## 2019-10-29 VITALS
BODY MASS INDEX: 21.11 KG/M2 | HEART RATE: 78 BPM | SYSTOLIC BLOOD PRESSURE: 113 MMHG | WEIGHT: 123 LBS | DIASTOLIC BLOOD PRESSURE: 71 MMHG

## 2019-10-29 DIAGNOSIS — M25.512 LEFT SHOULDER PAIN, UNSPECIFIED CHRONICITY: ICD-10-CM

## 2019-10-29 DIAGNOSIS — M25.512 CHRONIC LEFT SHOULDER PAIN: ICD-10-CM

## 2019-10-29 DIAGNOSIS — G89.29 CHRONIC LEFT SHOULDER PAIN: ICD-10-CM

## 2019-10-29 DIAGNOSIS — M75.82 ROTATOR CUFF TENDINITIS, LEFT: Primary | ICD-10-CM

## 2019-10-29 DIAGNOSIS — S89.92XA INJURY OF LEFT KNEE, INITIAL ENCOUNTER: Primary | ICD-10-CM

## 2019-10-29 DIAGNOSIS — M25.562 ACUTE PAIN OF LEFT KNEE: ICD-10-CM

## 2019-10-29 PROCEDURE — 97110 THERAPEUTIC EXERCISES: CPT | Performed by: PHYSICAL THERAPIST

## 2019-10-29 PROCEDURE — 99203 OFFICE O/P NEW LOW 30 MIN: CPT | Performed by: ORTHOPAEDIC SURGERY

## 2019-10-29 PROCEDURE — 97112 NEUROMUSCULAR REEDUCATION: CPT | Performed by: PHYSICAL THERAPIST

## 2019-10-29 PROCEDURE — 97140 MANUAL THERAPY 1/> REGIONS: CPT | Performed by: PHYSICAL THERAPIST

## 2019-10-29 PROCEDURE — 73030 X-RAY EXAM OF SHOULDER: CPT

## 2019-10-29 NOTE — PROGRESS NOTES
Daily Note     Today's date: 10/29/2019  Patient name: Star Willis  : 1950  MRN: [id number]  Referring provider: Octaviano Kulkarni MD  Dx:   Encounter Diagnosis     ICD-10-CM    1  Injury of left knee, initial encounter S89  92XA    2  Acute pain of left knee M25 562                   Subjective:  Pt states she is interested in this being her last visit today as she is not having any pain at the moment and has returned to almost full function without pain  Pt is still completing interventions at home and would like new HEP  Objective: See treatment diary below      Assessment: Pt was able to tolerate all new interventions and was tested for gross strength and ROM as she has comparable to unaffected side at this time  Pt was educated on new HEP and progression of current interventions  Pt will continue to progress as tolerated at home  Plan: DC post treatment today as Pt is (I) with HEP at this time and was given new HEP to improve symptoms at home  She will contact us if we have any issues in the next 3 weeks        Precautions: none    Daily Treatment Diary     Date 9/27 10/1 10/3 10/8 10/10 10/15 10/17 10/24     FOTO IE     NV xx       Re-eval IE              Manuals 10/17 10/24 10/29          MFD lateral/medial quad PK nv                                                                Exercise Diary 10/17 10/24 10/29          Bike elliptical 5'  Bike 7' 5          TM when able             gastroc and soleus stretch standing Prostretch 20"x5 Prostretch 30"x3 HEP standing          Hip abd standing -- -- --       --   Hip ext standing -- -- --       --   SLS foam Blue 20"x3 1' blue x 1; blk 30" x 2 bosu and minisquats          Side step TB GTB 4 laps GTB 4 laps           TKE -- -- --       --   LAQ  5" x 15 1', 5" x 15 4# 15x          SLR flex 1# x 20 1# x 20 No weight 20x          SLR ext & abd 1# x 20 ea 1# x 20 ea No weight 20x          Bridge             Clam with TB nv GTB 3"x15 Hamstring stretch nv 30" x 3 HEP          Lunges 10x B  10x ea B           Step downs np  HEP          Quad stretch 5x20"  30" x 3 HEP with knee prop          Tandem AMB foam beam  3 laps           Side step foam  3 laps           Side steps   GTB 2x10                                                 Modalities

## 2019-10-29 NOTE — PROGRESS NOTES
Orthopaedic Surgery - Office Note  Marianna Hatchet (49 y o  female)   : 1950   MRN: [id number]  Encounter Date: 10/29/2019    Chief Complaint   Patient presents with    Left Shoulder - Pain       Assessment / Plan  Left rotator cuff tendonitis     · continue with her HEP over the next 2-4 weeks    · Handouts were provided today   · We did discuss with the patient that if she is still symptomatic at her follow up we can consider CSI vs MRI   Return in about 4 weeks (around 2019) for [address]  History of Present Illness  Marianna Hatchet is a 81 y o  female who presents with left shoulder pain that has been ongoing for the past 2 weeks  Patient states that she was doing yard cleanup, sweeping and using a stationary bike at physical therapy for her left knee when she noticed her onset of pain into her left shoulder  Patient states that her pain is waking her from sleep and on some night she cannot even sleep due to the pain into her left shoulder  Patient previously treated her left shoulder last year at [facility] where she was given a steroid injection which provided her with relief until recently  She did do formal physical therapy for rotator cuff tendinitis at that time and she continues with her home exercise program daily  She describes difficulty using the 1-3 lb weights for her exercises and states she is unable to reach overhead  She has tried Tylenol ibuprofen and ice without relief of her symptoms  Patient is currently on prednisone for a sinus infection and states that this is helping her with some of her discomfort  Review of Systems  Pertinent items are noted in HPI  All other systems were reviewed and are negative  Physical Exam  /71   Pulse 78   Wt 55 8 kg (123 lb)   LMP  (LMP Unknown)   BMI 21 11 kg/m²   Cons: Appears well  No apparent distress  Psych: Alert  Oriented x3  Mood and affect normal   Eyes: PERRLA, EOMI  Resp: Normal effort    No audible wheezing or stridor  CV: Palpable pulse  No discernable arrhythmia  No LE edema  Lymph:  No palpable cervical, axillary, or inguinal lymphadenopathy  Skin: Warm  No palpable masses  No visible lesions  Neuro: Normal muscle tone  Normal and symmetric DTR's  LeftLeft Shoulder Exam  Alignment / Posture:  Normal shoulder posture  Inspection:  No swelling  Palpation:  anterior shoulder, AC jt  tenderness  ROM:  Shoulder   Shoulder ER 60  Shoulder IR T6   Strength:  5/5 supraspinatus, infraspinatus, and subscapularis  Stability:  No objective shoulder instability  Tests: (+) [place]more  (+) Cross-body adduction  (-) Neer  (-) Belly press  Neurovascular:  Sensation intact in Ax/R/M/U nerve distributions  2+ radial pulse  Studies Reviewed  I have personally reviewed pertinent films in PACS and my interpretation is no acute fractures or dislocations with minimal degenerative changes  Procedures  No procedures today  Medical, Surgical, Family, and Social History  The patient's medical history, family history, and social history, were reviewed and updated as appropriate      Past Medical History:   Diagnosis Date    Breast cancer ([facility] [address] ) 2003    left breast phyllodes       Past Surgical History:   Procedure Laterality Date    BREAST BIOPSY Right 2005    benign    BREAST LUMPECTOMY Left 2003    phyllodes    TONSILLECTOMY      WISDOM TOOTH EXTRACTION         Family History   Problem Relation Age of Onset    Uterine cancer Mother     Varicose Veins Mother     Hypertension Mother     Dysfunctional uterine bleeding Mother     Endometrial cancer Mother 80    COPD Father     Heart disease Father         Cardiac disorder    Heart attack Brother     Diabetes Family        Social History     Occupational History    Not on file   Tobacco Use    Smoking status: Former Smoker    Smokeless tobacco: Never Used    Tobacco comment: Per allscripts: former smoker   Substance and Sexual Activity    Alcohol use: Yes     Comment: occasional    Drug use: No    Sexual activity: Not Currently       Allergies   Allergen Reactions    Bee Venom Hives    Isoflavones     Mold Extract  [Trichophyton]     Penicillins          Current Outpatient Medications:     albuterol (PROVENTIL HFA,VENTOLIN HFA) 90 mcg/act inhaler, INHALE 1 PUFF AS NEEDED FOR WHEEZING, Disp: 8 5 Inhaler, Rfl: 0    azithromycin (ZITHROMAX) 250 mg tablet, Take 2 tablets (500 mg total) by mouth daily for 1 day, THEN 1 tablet (250 mg total) daily for 4 days  , Disp: 6 tablet, Rfl: 0    EPINEPHrine (EPIPEN) 0 3 mg/0 3 mL SOAJ, Inject 0 3 mg into the shoulder, thigh, or buttocks, Disp: , Rfl:     fluticasone (FLONASE) 50 mcg/act nasal spray, 2 sprays into each nostril daily, Disp: 16 g, Rfl: 0    fluticasone (FLOVENT HFA) 220 mcg/act inhaler, Inhale 2 puffs 2 (two) times a day Rinse mouth after use , Disp: 3 Inhaler, Rfl: 3    methocarbamol (ROBAXIN) 500 mg tablet, Take 1 tablet (500 mg total) by mouth daily at bedtime as needed for muscle spasms, Disp: 30 tablet, Rfl: 0    predniSONE 20 mg tablet, Take 2 tablets (40 mg total) by mouth daily, Disp: 10 tablet, Rfl: 0    escitalopram (LEXAPRO) 10 mg tablet, Take 1 tablet (10 mg total) by mouth daily (Patient not taking: Reported on 9/18/2019), Disp: 90 tablet, Rfl: 1    fluticasone (FLONASE) 50 mcg/act nasal spray, 1 spray into each nostril daily (Patient not taking: Reported on 9/18/2019), Disp: 16 g, Rfl: 0    predniSONE 10 mg tablet, 40 mg for 3 days  30 mg for 3 days  20 mg for 3 days   10 mg for 3 days then stop , Disp: 20 tablet, Rfl: 0    predniSONE 20 mg tablet, Take 2 tablets (40 mg total) by mouth daily, Disp: 10 tablet, Rfl: 0      Scribe Attestation    I,:   Nicholas Kaur am acting as a scribe while in the presence of the attending physician :        I,:   Rafael Friend MD personally performed the services described in this documentation    as scribed in my presence :

## 2019-10-30 ENCOUNTER — TELEPHONE (OUTPATIENT)
Dept: FAMILY MEDICINE CLINIC | Facility: CLINIC | Age: 69
End: 2019-10-30

## 2019-10-30 DIAGNOSIS — J01.81 OTHER ACUTE RECURRENT SINUSITIS: ICD-10-CM

## 2019-10-30 NOTE — TELEPHONE ENCOUNTER
Patient called to say that she has completed course of antibiotics, is feeling better overall, but still has heavy productive cough  She said she was told to call this week if she is not feeling better and possibly more antibiotic could be called in  Patient uses [facility]  Patient can be reached at [phone]  Please advise  Thank you

## 2019-10-30 NOTE — TELEPHONE ENCOUNTER
Recommend sending and steroid taper prednisone 40 mg x3 days then 30 mg x3 days then 20 mg x3 days then 10 mg x3 days then stop    If not improving she should return for evaluation

## 2019-10-31 ENCOUNTER — APPOINTMENT (OUTPATIENT)
Dept: PHYSICAL THERAPY | Facility: CLINIC | Age: 69
End: 2019-10-31
Payer: MEDICARE

## 2019-10-31 RX ORDER — PREDNISONE 10 MG/1
TABLET ORAL
Qty: 30 TABLET | Refills: 0
Start: 2019-10-31 | End: 2020-04-15

## 2019-10-31 NOTE — TELEPHONE ENCOUNTER
Have spoken with patient about medication and called in directly to [facility] pharmacy   Please sign as "no print "

## 2019-11-06 ENCOUNTER — ANNUAL EXAM (OUTPATIENT)
Dept: OBGYN CLINIC | Facility: CLINIC | Age: 69
End: 2019-11-06
Payer: MEDICARE

## 2019-11-06 VITALS
WEIGHT: 124.8 LBS | BODY MASS INDEX: 20.79 KG/M2 | HEIGHT: 65 IN | SYSTOLIC BLOOD PRESSURE: 120 MMHG | DIASTOLIC BLOOD PRESSURE: 80 MMHG

## 2019-11-06 DIAGNOSIS — Z12.31 ENCOUNTER FOR SCREENING MAMMOGRAM FOR BREAST CANCER: ICD-10-CM

## 2019-11-06 DIAGNOSIS — Z01.419 WOMEN'S ANNUAL ROUTINE GYNECOLOGICAL EXAMINATION: Primary | ICD-10-CM

## 2019-11-06 PROCEDURE — G0101 CA SCREEN;PELVIC/BREAST EXAM: HCPCS | Performed by: OBSTETRICS & GYNECOLOGY

## 2019-11-06 PROCEDURE — G0145 SCR C/V CYTO,THINLAYER,RESCR: HCPCS | Performed by: OBSTETRICS & GYNECOLOGY

## 2019-11-06 NOTE — PROGRESS NOTES
Assessment   Annual well-woman exam  No new gyn complaints or concerns        Plan   Screening mammography ordered   All questions answered  Await pap smear results  Subjective here for annual exam     Scotty Haddad is a 81 y o  female who presents for annual exam  Periods are absent patient is in menopause  Denies any pelvic pain symptoms vaginal discharge or bleeding  Denies any hot flashes or vaginal dryness  The patient reports that there is not domestic violence in her life  Current contraception: post menopausal status  History of abnormal Pap smear: no  Family history of uterine or ovarian cancer: no  Regular self breast exam: yes  History of abnormal mammogram: no  Family history of breast cancer: no  History of abnormal lipids: no  Menstrual History:  OB History        0    Para   0    Term   0       0    AB   0    Living   0       SAB   0    TAB   0    Ectopic   0    Multiple   0    Live Births   0                Menarche age: 17  No LMP recorded (lmp unknown)  Patient is postmenopausal      Review of Systems  Pertinent items are noted in HPI        Objective no acute distress   /80 (BP Location: Right arm, Patient Position: Sitting, Cuff Size: Standard)   Ht 5' 5" (1 651 m)   Wt 56 6 kg (124 lb 12 8 oz)   LMP  (LMP Unknown)   BMI 20 77 kg/m²     General:   alert and oriented, in no acute distress, alert, appears stated age and cooperative   Heart: regular rate and rhythm, S1, S2 normal, no murmur, click, rub or gallop   Lungs: clear to auscultation bilaterally   Abdomen: soft, non-tender, without masses or organomegaly   Vulva: normal, Bartholin's, Urethra, [place]'s normal, female escutcheon   Vagina: normal mucosa, normal discharge   Cervix: no bleeding following Pap, no cervical motion tenderness, no lesions and nulliparous appearance   Uterus: normal size, mobile, non-tender, normal shape and consistency   Adnexa: normal adnexa   Bilateral breast exam in the sitting and supine position with chaperone present, no visible or palpable breast lesions identified  No breast masses noted  No supraclavicular or axillary lymphadenopathy noted  No nipple discharge  Reviewed self-breast exam techniques  Rectal exam, no rectal masses, normal tone, no hemorrhoids visible or palpable

## 2019-11-12 LAB
LAB AP GYN PRIMARY INTERPRETATION: NORMAL
Lab: NORMAL

## 2020-01-08 ENCOUNTER — OFFICE VISIT (OUTPATIENT)
Dept: FAMILY MEDICINE CLINIC | Facility: CLINIC | Age: 70
End: 2020-01-08
Payer: MEDICARE

## 2020-01-08 VITALS
DIASTOLIC BLOOD PRESSURE: 66 MMHG | SYSTOLIC BLOOD PRESSURE: 112 MMHG | RESPIRATION RATE: 16 BRPM | WEIGHT: 124 LBS | HEIGHT: 65 IN | BODY MASS INDEX: 20.66 KG/M2

## 2020-01-08 DIAGNOSIS — H61.21 IMPACTED CERUMEN OF RIGHT EAR: Primary | ICD-10-CM

## 2020-01-08 DIAGNOSIS — J45.909 UNCOMPLICATED ASTHMA, UNSPECIFIED ASTHMA SEVERITY, UNSPECIFIED WHETHER PERSISTENT: ICD-10-CM

## 2020-01-08 DIAGNOSIS — J06.9 ACUTE UPPER RESPIRATORY INFECTION: ICD-10-CM

## 2020-01-08 DIAGNOSIS — F33.41 RECURRENT MAJOR DEPRESSIVE DISORDER, IN PARTIAL REMISSION (HCC): ICD-10-CM

## 2020-01-08 PROBLEM — H61.20 CERUMEN IMPACTION: Status: ACTIVE | Noted: 2020-01-08

## 2020-01-08 PROCEDURE — 99214 OFFICE O/P EST MOD 30 MIN: CPT | Performed by: FAMILY MEDICINE

## 2020-01-08 RX ORDER — FLUTICASONE PROPIONATE 50 MCG
2 SPRAY, SUSPENSION (ML) NASAL DAILY
Qty: 16 G | Refills: 0 | Status: SHIPPED | OUTPATIENT
Start: 2020-01-08 | End: 2020-02-04

## 2020-01-09 NOTE — PROGRESS NOTES
Assessment/Plan:    73-year-old woman with:  Cerumen impaction, URI, asthma and major depression  Discussed treatment options with risks and benefits  Will add Debrox drops and refer to ENT if not improving  Will restart Flonase and continue inhalers otherwise along with her other medication advised to call if not improving or worsening    No problem-specific Assessment & Plan notes found for this encounter  Diagnoses and all orders for this visit:    Impacted cerumen of right ear  -     carbamide peroxide (DEBROX) 6 5 % otic solution; Administer 5 drops into both ears 2 (two) times a day  -     Ambulatory Referral to Otolaryngology; Future    Acute upper respiratory infection  -     fluticasone (FLONASE) 50 mcg/act nasal spray; 2 sprays into each nostril daily    Uncomplicated asthma, unspecified asthma severity, unspecified whether persistent  -     fluticasone (FLONASE) 50 mcg/act nasal spray; 2 sprays into each nostril daily    Recurrent major depressive disorder, in partial remission (HCC)          Subjective:     Chief Complaint   Patient presents with    Earache     B/L ear congestion  No cold symptom associated   Patient ID: Jessica Mckeon is a 78 y o  female  Patient is a 73-year-old woman who presents complaining of cough congestion and block sensation in her ears on both sides  No fevers chills nausea vomiting  She does have some wheezing in her chest   She also admits that her major depression has been well controlled on her current medications  No other complaints at this time      The following portions of the patient's history were reviewed and updated as appropriate: allergies, current medications, past family history, past medical history, past social history, past surgical history and problem list     Review of Systems   Constitutional: Negative  HENT: Positive for congestion and hearing loss  Eyes: Negative  Respiratory: Positive for cough      Cardiovascular: Negative  Gastrointestinal: Negative  Endocrine: Negative  Genitourinary: Negative  Musculoskeletal: Negative  Allergic/Immunologic: Negative  Neurological: Negative  Hematological: Negative  Psychiatric/Behavioral: Negative  All other systems reviewed and are negative  Objective:      /66 (BP Location: Left arm)   Resp 16   Ht 5' 5" (1 651 m)   Wt 56 2 kg (124 lb)   LMP  (LMP Unknown)   BMI 20 63 kg/m²          Physical Exam   Constitutional: She is oriented to person, place, and time  She appears well-developed and well-nourished  HENT:   Head: Atraumatic  Bilateral cerumen impaction   Eyes: Pupils are equal, round, and reactive to light  Conjunctivae and EOM are normal    Neck: Normal range of motion  Cardiovascular: Normal rate, regular rhythm and normal heart sounds  Pulmonary/Chest: Effort normal  No respiratory distress  She has wheezes  Abdominal: Soft  She exhibits no distension  There is no tenderness  There is no rebound and no guarding  Musculoskeletal: Normal range of motion  Neurological: She is alert and oriented to person, place, and time  No cranial nerve deficit  Skin: Skin is warm and dry  Psychiatric: She has a normal mood and affect   Her behavior is normal  Judgment and thought content normal

## 2020-02-01 DIAGNOSIS — J06.9 ACUTE UPPER RESPIRATORY INFECTION: ICD-10-CM

## 2020-02-01 DIAGNOSIS — J45.909 UNCOMPLICATED ASTHMA, UNSPECIFIED ASTHMA SEVERITY, UNSPECIFIED WHETHER PERSISTENT: ICD-10-CM

## 2020-02-04 RX ORDER — FLUTICASONE PROPIONATE 50 MCG
SPRAY, SUSPENSION (ML) NASAL
Qty: 16 ML | Refills: 0 | Status: SHIPPED | OUTPATIENT
Start: 2020-02-04 | End: 2020-06-10 | Stop reason: SDUPTHER

## 2020-03-11 DIAGNOSIS — J45.909 UNCOMPLICATED ASTHMA, UNSPECIFIED ASTHMA SEVERITY, UNSPECIFIED WHETHER PERSISTENT: ICD-10-CM

## 2020-03-12 RX ORDER — ALBUTEROL SULFATE 90 UG/1
1 AEROSOL, METERED RESPIRATORY (INHALATION) AS NEEDED
Qty: 8.5 INHALER | Refills: 0 | Status: SHIPPED | OUTPATIENT
Start: 2020-03-12 | End: 2020-04-15 | Stop reason: SDUPTHER

## 2020-04-15 ENCOUNTER — OFFICE VISIT (OUTPATIENT)
Dept: FAMILY MEDICINE CLINIC | Facility: CLINIC | Age: 70
End: 2020-04-15
Payer: MEDICARE

## 2020-04-15 VITALS
TEMPERATURE: 97.1 F | DIASTOLIC BLOOD PRESSURE: 76 MMHG | HEART RATE: 96 BPM | SYSTOLIC BLOOD PRESSURE: 118 MMHG | BODY MASS INDEX: 20.99 KG/M2 | HEIGHT: 65 IN | WEIGHT: 126 LBS

## 2020-04-15 DIAGNOSIS — F32.A DEPRESSION, UNSPECIFIED DEPRESSION TYPE: ICD-10-CM

## 2020-04-15 DIAGNOSIS — Z00.00 MEDICARE ANNUAL WELLNESS VISIT, SUBSEQUENT: ICD-10-CM

## 2020-04-15 DIAGNOSIS — S89.92XA INJURY OF LEFT KNEE, INITIAL ENCOUNTER: Primary | ICD-10-CM

## 2020-04-15 DIAGNOSIS — Z12.11 COLON CANCER SCREENING: ICD-10-CM

## 2020-04-15 DIAGNOSIS — E78.5 HYPERLIPIDEMIA, UNSPECIFIED HYPERLIPIDEMIA TYPE: ICD-10-CM

## 2020-04-15 DIAGNOSIS — L30.9 DERMATITIS: ICD-10-CM

## 2020-04-15 DIAGNOSIS — J45.909 UNCOMPLICATED ASTHMA, UNSPECIFIED ASTHMA SEVERITY, UNSPECIFIED WHETHER PERSISTENT: ICD-10-CM

## 2020-04-15 DIAGNOSIS — E55.9 VITAMIN D DEFICIENCY: ICD-10-CM

## 2020-04-15 PROCEDURE — 1160F RVW MEDS BY RX/DR IN RCRD: CPT | Performed by: FAMILY MEDICINE

## 2020-04-15 PROCEDURE — 1036F TOBACCO NON-USER: CPT | Performed by: FAMILY MEDICINE

## 2020-04-15 PROCEDURE — 1170F FXNL STATUS ASSESSED: CPT | Performed by: FAMILY MEDICINE

## 2020-04-15 PROCEDURE — 1125F AMNT PAIN NOTED PAIN PRSNT: CPT | Performed by: FAMILY MEDICINE

## 2020-04-15 PROCEDURE — G0438 PPPS, INITIAL VISIT: HCPCS | Performed by: FAMILY MEDICINE

## 2020-04-15 PROCEDURE — 99214 OFFICE O/P EST MOD 30 MIN: CPT | Performed by: FAMILY MEDICINE

## 2020-04-15 PROCEDURE — 3008F BODY MASS INDEX DOCD: CPT | Performed by: FAMILY MEDICINE

## 2020-04-15 RX ORDER — PREDNISONE 20 MG/1
40 TABLET ORAL DAILY
Qty: 10 TABLET | Refills: 0 | Status: SHIPPED | OUTPATIENT
Start: 2020-04-15 | End: 2020-07-08 | Stop reason: SDUPTHER

## 2020-04-15 RX ORDER — ESCITALOPRAM OXALATE 10 MG/1
10 TABLET ORAL DAILY
Qty: 90 TABLET | Refills: 1 | Status: SHIPPED | OUTPATIENT
Start: 2020-04-15 | End: 2020-11-06

## 2020-04-15 RX ORDER — ALBUTEROL SULFATE 90 UG/1
1 AEROSOL, METERED RESPIRATORY (INHALATION) AS NEEDED
Qty: 8.5 INHALER | Refills: 0 | Status: SHIPPED | OUTPATIENT
Start: 2020-04-15 | End: 2021-05-19 | Stop reason: SDUPTHER

## 2020-05-27 DIAGNOSIS — Z12.11 COLON CANCER SCREENING: Primary | ICD-10-CM

## 2020-06-10 DIAGNOSIS — J06.9 ACUTE UPPER RESPIRATORY INFECTION: ICD-10-CM

## 2020-06-10 DIAGNOSIS — J45.909 UNCOMPLICATED ASTHMA, UNSPECIFIED ASTHMA SEVERITY, UNSPECIFIED WHETHER PERSISTENT: ICD-10-CM

## 2020-06-10 RX ORDER — FLUTICASONE PROPIONATE 50 MCG
2 SPRAY, SUSPENSION (ML) NASAL DAILY
Qty: 16 ML | Refills: 0 | Status: SHIPPED | OUTPATIENT
Start: 2020-06-10 | End: 2020-11-06

## 2020-07-08 DIAGNOSIS — S89.92XA INJURY OF LEFT KNEE, INITIAL ENCOUNTER: ICD-10-CM

## 2020-07-08 RX ORDER — PREDNISONE 20 MG/1
40 TABLET ORAL DAILY
Qty: 10 TABLET | Refills: 0 | Status: SHIPPED | OUTPATIENT
Start: 2020-07-08 | End: 2020-08-19

## 2020-08-07 ENCOUNTER — APPOINTMENT (OUTPATIENT)
Dept: LAB | Facility: MEDICAL CENTER | Age: 70
End: 2020-08-07
Payer: MEDICARE

## 2020-08-07 DIAGNOSIS — S89.92XA INJURY OF LEFT KNEE, INITIAL ENCOUNTER: ICD-10-CM

## 2020-08-07 DIAGNOSIS — E55.9 VITAMIN D DEFICIENCY: ICD-10-CM

## 2020-08-07 DIAGNOSIS — J45.909 UNCOMPLICATED ASTHMA, UNSPECIFIED ASTHMA SEVERITY, UNSPECIFIED WHETHER PERSISTENT: ICD-10-CM

## 2020-08-07 DIAGNOSIS — L30.9 DERMATITIS: ICD-10-CM

## 2020-08-07 DIAGNOSIS — F32.A DEPRESSION, UNSPECIFIED DEPRESSION TYPE: ICD-10-CM

## 2020-08-07 DIAGNOSIS — E78.5 HYPERLIPIDEMIA, UNSPECIFIED HYPERLIPIDEMIA TYPE: ICD-10-CM

## 2020-08-07 DIAGNOSIS — Z12.11 COLON CANCER SCREENING: ICD-10-CM

## 2020-08-07 LAB
25(OH)D3 SERPL-MCNC: 34 NG/ML (ref 30–100)
ALBUMIN SERPL BCP-MCNC: 3.8 G/DL (ref 3.5–5)
ALP SERPL-CCNC: 78 U/L (ref 46–116)
ALT SERPL W P-5'-P-CCNC: 23 U/L (ref 12–78)
ANION GAP SERPL CALCULATED.3IONS-SCNC: 6 MMOL/L (ref 4–13)
AST SERPL W P-5'-P-CCNC: 20 U/L (ref 5–45)
BASOPHILS # BLD AUTO: 0.1 THOUSANDS/ΜL (ref 0–0.1)
BASOPHILS NFR BLD AUTO: 2 % (ref 0–1)
BILIRUB SERPL-MCNC: 0.58 MG/DL (ref 0.2–1)
BUN SERPL-MCNC: 13 MG/DL (ref 5–25)
CALCIUM SERPL-MCNC: 9 MG/DL (ref 8.3–10.1)
CHLORIDE SERPL-SCNC: 105 MMOL/L (ref 100–108)
CHOLEST SERPL-MCNC: 207 MG/DL (ref 50–200)
CO2 SERPL-SCNC: 27 MMOL/L (ref 21–32)
CREAT SERPL-MCNC: 0.71 MG/DL (ref 0.6–1.3)
EOSINOPHIL # BLD AUTO: 0.14 THOUSAND/ΜL (ref 0–0.61)
EOSINOPHIL NFR BLD AUTO: 2 % (ref 0–6)
ERYTHROCYTE [DISTWIDTH] IN BLOOD BY AUTOMATED COUNT: 13.3 % (ref 11.6–15.1)
GFR SERPL CREATININE-BSD FRML MDRD: 88 ML/MIN/1.73SQ M
GLUCOSE P FAST SERPL-MCNC: 81 MG/DL (ref 65–99)
HCT VFR BLD AUTO: 43.9 % (ref 34.8–46.1)
HDLC SERPL-MCNC: 93 MG/DL
HGB BLD-MCNC: 14.3 G/DL (ref 11.5–15.4)
IMM GRANULOCYTES # BLD AUTO: 0.01 THOUSAND/UL (ref 0–0.2)
IMM GRANULOCYTES NFR BLD AUTO: 0 % (ref 0–2)
LDLC SERPL CALC-MCNC: 102 MG/DL (ref 0–100)
LYMPHOCYTES # BLD AUTO: 1.99 THOUSANDS/ΜL (ref 0.6–4.47)
LYMPHOCYTES NFR BLD AUTO: 30 % (ref 14–44)
MCH RBC QN AUTO: 29.9 PG (ref 26.8–34.3)
MCHC RBC AUTO-ENTMCNC: 32.6 G/DL (ref 31.4–37.4)
MCV RBC AUTO: 92 FL (ref 82–98)
MONOCYTES # BLD AUTO: 0.68 THOUSAND/ΜL (ref 0.17–1.22)
MONOCYTES NFR BLD AUTO: 10 % (ref 4–12)
NEUTROPHILS # BLD AUTO: 3.81 THOUSANDS/ΜL (ref 1.85–7.62)
NEUTS SEG NFR BLD AUTO: 56 % (ref 43–75)
NRBC BLD AUTO-RTO: 0 /100 WBCS
PLATELET # BLD AUTO: 181 THOUSANDS/UL (ref 149–390)
PMV BLD AUTO: 11.4 FL (ref 8.9–12.7)
POTASSIUM SERPL-SCNC: 4.6 MMOL/L (ref 3.5–5.3)
PROT SERPL-MCNC: 7 G/DL (ref 6.4–8.2)
RBC # BLD AUTO: 4.78 MILLION/UL (ref 3.81–5.12)
SODIUM SERPL-SCNC: 138 MMOL/L (ref 136–145)
TRIGL SERPL-MCNC: 61 MG/DL
TSH SERPL DL<=0.05 MIU/L-ACNC: 0.85 UIU/ML (ref 0.36–3.74)
WBC # BLD AUTO: 6.73 THOUSAND/UL (ref 4.31–10.16)

## 2020-08-07 PROCEDURE — 85025 COMPLETE CBC W/AUTO DIFF WBC: CPT

## 2020-08-07 PROCEDURE — 36415 COLL VENOUS BLD VENIPUNCTURE: CPT

## 2020-08-07 PROCEDURE — 84443 ASSAY THYROID STIM HORMONE: CPT

## 2020-08-07 PROCEDURE — 80053 COMPREHEN METABOLIC PANEL: CPT

## 2020-08-07 PROCEDURE — 82306 VITAMIN D 25 HYDROXY: CPT

## 2020-08-07 PROCEDURE — 80061 LIPID PANEL: CPT

## 2020-08-19 ENCOUNTER — OFFICE VISIT (OUTPATIENT)
Dept: FAMILY MEDICINE CLINIC | Facility: CLINIC | Age: 70
End: 2020-08-19
Payer: MEDICARE

## 2020-08-19 VITALS
OXYGEN SATURATION: 96 % | SYSTOLIC BLOOD PRESSURE: 112 MMHG | HEIGHT: 65 IN | HEART RATE: 80 BPM | WEIGHT: 124 LBS | DIASTOLIC BLOOD PRESSURE: 78 MMHG | BODY MASS INDEX: 20.66 KG/M2

## 2020-08-19 DIAGNOSIS — G62.9 PERIPHERAL POLYNEUROPATHY: ICD-10-CM

## 2020-08-19 DIAGNOSIS — T63.463S: Primary | ICD-10-CM

## 2020-08-19 DIAGNOSIS — L30.9 DERMATITIS: ICD-10-CM

## 2020-08-19 PROCEDURE — 1160F RVW MEDS BY RX/DR IN RCRD: CPT | Performed by: FAMILY MEDICINE

## 2020-08-19 PROCEDURE — 99214 OFFICE O/P EST MOD 30 MIN: CPT | Performed by: FAMILY MEDICINE

## 2020-08-19 PROCEDURE — 1036F TOBACCO NON-USER: CPT | Performed by: FAMILY MEDICINE

## 2020-08-19 RX ORDER — TRIAMCINOLONE ACETONIDE 1 MG/G
CREAM TOPICAL 2 TIMES DAILY
Qty: 30 G | Refills: 0 | Status: SHIPPED | OUTPATIENT
Start: 2020-08-19 | End: 2020-11-06

## 2020-08-19 RX ORDER — CYANOCOBALAMIN 1000 UG/ML
1000 INJECTION INTRAMUSCULAR; SUBCUTANEOUS
Status: DISCONTINUED | OUTPATIENT
Start: 2020-08-19 | End: 2021-09-14 | Stop reason: ALTCHOICE

## 2020-08-19 RX ORDER — EPINEPHRINE 0.3 MG/.3ML
0.3 INJECTION SUBCUTANEOUS ONCE
Qty: 0.6 ML | Refills: 0 | Status: SHIPPED | OUTPATIENT
Start: 2020-08-19 | End: 2021-10-14

## 2020-08-19 RX ORDER — PREDNISONE 10 MG/1
TABLET ORAL
Qty: 40 TABLET | Refills: 0 | Status: SHIPPED | OUTPATIENT
Start: 2020-08-19 | End: 2020-11-06

## 2020-08-19 RX ADMIN — CYANOCOBALAMIN 1000 MCG: 1000 INJECTION INTRAMUSCULAR; SUBCUTANEOUS at 15:04

## 2020-08-20 NOTE — PROGRESS NOTES
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, assault, sequela  -     EPINEPHrine (EPIPEN) 0 3 mg/0 3 mL SOAJ; Inject 0 3 mL (0 3 mg total) into a muscle once for 1 dose    Dermatitis  -     triamcinolone (KENALOG) 0 1 % cream; Apply topically 2 (two) times a day  -     predniSONE 10 mg tablet; Take 4 tabs daily x 4d, then 3x4d, then 2x4d, then 1x4d then stop  Peripheral polyneuropathy  -     cyanocobalamin injection 1,000 mcg          Subjective:     Chief Complaint   Patient presents with    Follow-up     bruising on arms and some are bleeding    Medication Refill     epi pen refill        Patient ID: Ly Ochoa is a 78 y o  female  Patient is a 30-year-old woman who presents for follow-up on dermatitis on her legs and history of anaphylaxis she needs a refill EpiPen no fevers chills nausea vomiting  Tolerating p o  Intake  She would like a B12 injection due to her peripheral neuropathy symptoms    Medication Refill         The following portions of the patient's history were reviewed and updated as appropriate: allergies, current medications, past family history, past medical history, past social history, past surgical history and problem list     Review of Systems   Constitutional: Negative  HENT: Negative  Eyes: Negative  Respiratory: Negative  Cardiovascular: Negative  Gastrointestinal: Negative  Endocrine: Negative  Genitourinary: Negative  Musculoskeletal: Negative  Allergic/Immunologic: Negative  Neurological: Negative  Hematological: Negative  Psychiatric/Behavioral: Negative  All other systems reviewed and are negative  Objective:      /78 (BP Location: Left arm, Patient Position: Sitting, Cuff Size: Standard)   Pulse 80   Ht 5' 5" (1 651 m)   Wt 56 2 kg (124 lb)   LMP  (LMP Unknown)   SpO2 96%   BMI 20 63 kg/m²          Physical Exam  Constitutional:       Appearance: She is well-developed  HENT:      Head: Atraumatic  Right Ear: External ear normal       Left Ear: External ear normal    Eyes:      Conjunctiva/sclera: Conjunctivae normal       Pupils: Pupils are equal, round, and reactive to light  Neck:      Musculoskeletal: Normal range of motion  Pulmonary:      Effort: Pulmonary effort is normal  No respiratory distress  Abdominal:      General: There is no distension  Musculoskeletal: Normal range of motion  Skin:     General: Skin is warm and dry  Findings: Rash present  Comments: Erythematous rash bilateral ankles   Neurological:      Mental Status: She is alert and oriented to person, place, and time  Cranial Nerves: No cranial nerve deficit  Psychiatric:         Behavior: Behavior normal          Thought Content:  Thought content normal          Judgment: Judgment normal

## 2020-08-31 ENCOUNTER — TELEPHONE (OUTPATIENT)
Dept: FAMILY MEDICINE CLINIC | Facility: CLINIC | Age: 70
End: 2020-08-31

## 2020-09-01 DIAGNOSIS — E78.5 HYPERLIPIDEMIA, UNSPECIFIED HYPERLIPIDEMIA TYPE: Primary | ICD-10-CM

## 2020-09-01 DIAGNOSIS — E55.9 VITAMIN D DEFICIENCY: ICD-10-CM

## 2020-09-10 ENCOUNTER — CLINICAL SUPPORT (OUTPATIENT)
Dept: NUTRITION | Facility: HOSPITAL | Age: 70
End: 2020-09-10
Payer: MEDICARE

## 2020-09-10 VITALS — BODY MASS INDEX: 20.27 KG/M2 | WEIGHT: 121.8 LBS

## 2020-09-10 DIAGNOSIS — E78.5 HYPERLIPIDEMIA, UNSPECIFIED HYPERLIPIDEMIA TYPE: ICD-10-CM

## 2020-09-10 DIAGNOSIS — E55.9 VITAMIN D DEFICIENCY: ICD-10-CM

## 2020-09-10 PROCEDURE — 97802 MEDICAL NUTRITION INDIV IN: CPT

## 2020-09-10 NOTE — PROGRESS NOTES
Initial Nutrition Assessment Form    Patient Name: Kylie Liu    YOB: 1950    Sex: Female     Assessment Date: 9/10/2020  Start Time: 10 Stop Time: 11 Total Minutes: 61     Data:  Present at session: self   Parent/Patient Concerns: "I don't like meat"   Medical Dx/Reason for Referral: hyperlipidemia   Past Medical History:   Diagnosis Date    Allergic 1999    penicillin,ragweed,cat,dog,mold,grass,moderate soy    Allergic rhinitis     Breast cancer ([address] ) 2003    left breast phyllodes    Bruise easily    Current Outpatient Medications   Medication Sig Dispense Refill    albuterol (PROVENTIL HFA,VENTOLIN HFA) 90 mcg/act inhaler Inhale 1 puff as needed for wheezing 8 5 Inhaler 0    EPINEPHrine (EPIPEN) 0 3 mg/0 3 mL SOAJ Inject 0 3 mL (0 3 mg total) into a muscle once for 1 dose 0 6 mL 0    escitalopram (LEXAPRO) 10 mg tablet Take 1 tablet (10 mg total) by mouth daily 90 tablet 1    fluticasone (FLONASE) 50 mcg/act nasal spray 2 sprays into each nostril daily (Patient not taking: Reported on 8/19/2020) 16 mL 0    fluticasone (FLOVENT HFA) 220 mcg/act inhaler Inhale 2 puffs 2 (two) times a day Rinse mouth after use  (Patient not taking: Reported on 1/14/2020) 3 Inhaler 3    Loratadine-Pseudoephedrine (ALAVERT ALLERGY/SINUS PO) Take by mouth      predniSONE 10 mg tablet Take 4 tabs daily x 4d, then 3x4d, then 2x4d, then 1x4d then stop   40 tablet 0    triamcinolone (KENALOG) 0 1 % cream Apply topically 2 (two) times a day 30 g 0     Current Facility-Administered Medications   Medication Dose Route Frequency Provider Last Rate Last Dose    cyanocobalamin injection 1,000 mcg  1,000 mcg Intramuscular Q30 Days Blanco Toribio MD   1,000 mcg at 08/19/20 1504        Additional Meds/Supplements: Collagen; areds for eyes   Special Learning Needs: n/a   Height: HC Readings from Last 3 Encounters:   No data found for [facility]       Weight: Wt Readings from Last 10 Encounters:   09/10/20 55 2 kg (121 lb 12 8 oz)   08/19/20 56 2 kg (124 lb)   04/15/20 57 2 kg (126 lb)   01/14/20 56 7 kg (125 lb)   01/08/20 56 2 kg (124 lb)   11/06/19 56 6 kg (124 lb 12 8 oz)   10/29/19 55 8 kg (123 lb)   10/25/19 55 3 kg (122 lb)   10/16/19 55 3 kg (122 lb)   10/02/19 55 8 kg (123 lb)     Estimated body mass index is 20 27 kg/m² as calculated from the following:    Height as of 8/19/20: 5' 5" (1 651 m)  Weight as of this encounter: 55 2 kg (121 lb 12 8 oz)  Recent Weight Change: []Yes     [x]No  Amount:       Energy Needs: 1656kcals (30kcals/kg) for maintenance   Allergies   Allergen Reactions    Bee Venom Hives    Mold Extract  [Trichophyton]     Penicillins     Soy Isoflavones        Social History     Substance and Sexual Activity   Alcohol Use Yes    Alcohol/week: 1 0 standard drinks    Types: 1 Glasses of wine per week    Frequency: 2-4 times a month    Drinks per session: 1 or 2    Comment: occasional    One drink a month   Social History     Tobacco Use   Smoking Status Former Smoker    Last attempt to quit: 2010    Years since quitting: 10 6   Smokeless Tobacco Never Used    n/a   Who shops? patient   Who cooks? patient   Exercise: Yard work [employer]-walking 2x/wk 30 mins 1 5miles; stationary bike 3x/wk 7-12minutes; hand held wts 30-40 minutes daily   Prior Counseling? []Yes     [x]No  When:      Why:         Diet Hx: 15 mins meditation then exercise/youtube exercise for seniors and stretching; gallon of water a day; Froylan's killer bread as snack with fruit spread; diet cranberry juice mixed with gatorade  Breakfast:  Coffee- creamer; 9-930 oatmeal 1/2c and pear flax and cinnammon, water; bran with banana cinnamon and flax, 1 egg and 1sl toast-butter with canola or olive or fruit and cheese; yogurt with fruit   730  a m  Lunch:  Tuna 1 sl bread with greens tomatoes and avocado; ensure; salad- olive tomato carrots celery tuna or turkey maybe swiss;    12   p m           Dinner:  Cod potato butter chives; ham and cabbage potatoes and carrots-anali   430    p m  Snacks:  nothing after 730 AM - tina x1-2 or fruit  PM - fruit pretzels- whole grain tortilla chip  HS - 7  chobani with fruit; fage        Nutrition Diagnosis:   Altered Nutrition-Related Laboratory values  related to Kidney, liver, cardiac, endocrine, neurologic, and/or pulmonary dysfunction as evidenced by  Abnormal serum lipids       Medical Nutrition Therapy Intervention:  []Individualized Meal Plan []Understanding Lab Values   []Basic Pathophysiology of Disease []Food/Medication Interactions   []Food Diary []Exercise   []Lifestyle/Behavior Modification Techniques []Medication, Mechanism of Action   []Label Reading []Self Blood Glucose Monitoring   [x]Weight/BMI Goals-maintain current wt [x]Other - meditation then exercise after she wakes up    Other Notes: bed 10 asleep in 30mins, waking up 6; waking up twice during the night with RLS and muscle cramps        Comprehension: []Excellent  [x]Very Good  []Good  []Fair   []Poor    Receptivity: []Excellent  [x]Very Good  []Good  []Fair   []Poor    Expected Compliance: []Excellent  [x]Very Good  []Good  []Fair   []Poor        Goals:  1  1-3oz protein every meal   2  48 oz water daily vs 64 oz   3  No follow-ups on file    Labs:  CMP  Lab Results   Component Value Date     08/11/2017    K 4 6 08/07/2020     08/07/2020    CO2 27 08/07/2020    BUN 13 08/07/2020    CREATININE 0 71 08/07/2020    GLUF 81 08/07/2020    CALCIUM 9 0 08/07/2020    AST 20 08/07/2020    ALT 23 08/07/2020    ALKPHOS 78 08/07/2020    PROT 6 7 08/11/2017    BILITOT 0 4 08/11/2017    EGFR 88 08/07/2020       BMP  Lab Results   Component Value Date    CALCIUM 9 0 08/07/2020     08/11/2017    K 4 6 08/07/2020    CO2 27 08/07/2020     08/07/2020    BUN 13 08/07/2020    CREATININE 0 71 08/07/2020       Lipids  No results found for: CHOL  Lab Results   Component Value Date    HDL 93 08/07/2020    HDL 77 04/13/2019     (H) 10/14/2016     Lab Results   Component Value Date    LDLCALC 102 (H) 08/07/2020    LDLCALC 88 04/13/2019    LDLCALC 71 10/14/2016     Lab Results   Component Value Date    TRIG 61 08/07/2020    TRIG 111 04/13/2019    TRIG 68 10/14/2016     No results found for: CHOLHDL    Hemoglobin A1C  No results found for: HGBA1C    Fasting Glucose  Lab Results   Component Value Date    GLUF 81 08/07/2020       Insulin     Thyroid  No results found for: TSH, L9NLCPV, I8RMLGA, THYROIDAB    Hepatic Function Panel  Lab Results   Component Value Date    ALT 23 08/07/2020    AST 20 08/07/2020    ALKPHOS 78 08/07/2020    BILITOT 0 4 08/11/2017       Celiac Disease Antibody Panel  No results found for: ENDOMYSIAL IGA, GLIADIN IGA, GLIADIN IGG, IGA, TISSUE TRANSGLUT AB, TTG IGA   Iron  No results found for: IRON, TIBC, FERRITIN    Vitamins  No results found for: VITAMIN B2   No results found for: NICOTINAMIDE, NICOTINIC ACID   No results found for: VITAMINB6  No results found for: [id number]  No results found for: VITB5  No results found for: O8GQQZIQ  No results found for: THYROGLB  No results found for: VITAMIN K   No results found for: 25-HYDROXY VIT D   No components found for: VITAMINE     Yuliyaon MS RD LDN  Elyssa Trotter  [address] [place] [id number]

## 2020-09-16 ENCOUNTER — OFFICE VISIT (OUTPATIENT)
Dept: FAMILY MEDICINE CLINIC | Facility: CLINIC | Age: 70
End: 2020-09-16
Payer: MEDICARE

## 2020-09-16 VITALS
WEIGHT: 121 LBS | OXYGEN SATURATION: 98 % | HEIGHT: 65 IN | DIASTOLIC BLOOD PRESSURE: 68 MMHG | HEART RATE: 63 BPM | BODY MASS INDEX: 20.16 KG/M2 | SYSTOLIC BLOOD PRESSURE: 110 MMHG | TEMPERATURE: 98.1 F

## 2020-09-16 DIAGNOSIS — G62.9 PERIPHERAL POLYNEUROPATHY: ICD-10-CM

## 2020-09-16 DIAGNOSIS — M79.671 RIGHT FOOT PAIN: Primary | ICD-10-CM

## 2020-09-16 DIAGNOSIS — R10.813 RIGHT LOWER QUADRANT ABDOMINAL TENDERNESS WITHOUT REBOUND TENDERNESS: ICD-10-CM

## 2020-09-16 DIAGNOSIS — I83.93 VARICOSE VEINS OF BOTH LOWER EXTREMITIES, UNSPECIFIED WHETHER COMPLICATED: ICD-10-CM

## 2020-09-16 PROCEDURE — 99214 OFFICE O/P EST MOD 30 MIN: CPT | Performed by: FAMILY MEDICINE

## 2020-09-16 RX ORDER — METHOCARBAMOL 500 MG/1
500 TABLET, FILM COATED ORAL
Qty: 30 TABLET | Refills: 0 | Status: SHIPPED | OUTPATIENT
Start: 2020-09-16 | End: 2020-11-06

## 2020-09-16 RX ORDER — CYANOCOBALAMIN 1000 UG/ML
1000 INJECTION INTRAMUSCULAR; SUBCUTANEOUS
Status: DISCONTINUED | OUTPATIENT
Start: 2020-09-16 | End: 2020-09-17

## 2020-09-16 RX ADMIN — CYANOCOBALAMIN 1000 MCG: 1000 INJECTION INTRAMUSCULAR; SUBCUTANEOUS at 12:01

## 2020-09-17 NOTE — PROGRESS NOTES
Assessment/Plan:    70-year-old woman with:  right foot pain varicose veins and right lower quadrant pain along with neuropathy symptoms  Discussed workup and treatment options with risks and benefits  Discussed heat and cold stretching anti-inflammatories will check x-ray and refer to Podiatry and give refilled methocarbamol for breakthrough symptoms  Will refer to vascular surgery per patient's request and discussed elevating legs in using compression  Will also check ultrasound right lower quadrant buys to call back if not improving worsening    No problem-specific Assessment & Plan notes found for this encounter  Diagnoses and all orders for this visit:    Right foot pain  -     XR foot 3+ vw right; Future  -     Ambulatory referral to Podiatry; Future  -     methocarbamol (ROBAXIN) 500 mg tablet; Take 1 tablet (500 mg total) by mouth daily at bedtime as needed for muscle spasms    Varicose veins of both lower extremities, unspecified whether complicated  -     Ambulatory referral to Vascular Surgery; Future    Right lower quadrant abdominal tenderness without rebound tenderness  -     US abdomen limited; Future    Peripheral polyneuropathy  -     cyanocobalamin injection 1,000 mcg          Subjective:     Chief Complaint   Patient presents with    Foot Swelling     pateint complains of right foot/ankle pain appox 2 weeks ,         Patient ID: Ephraim Rose is a 78 y o  female  Patient is a 70-year-old male presents for follow-up on right foot pain varicose veins and right lower quadrant pain along with neuropathy symptoms are not getting better no fevers chills nausea vomiting  Tolerating p o   Intake no other complaints at this time      The following portions of the patient's history were reviewed and updated as appropriate: allergies, current medications, past family history, past medical history, past social history, past surgical history and problem list     Review of Systems Constitutional: Negative  HENT: Negative  Eyes: Negative  Respiratory: Negative  Cardiovascular: Negative  Gastrointestinal: Negative  Endocrine: Negative  Genitourinary: Negative  Musculoskeletal: Positive for arthralgias and myalgias  Allergic/Immunologic: Negative  Neurological: Negative  Hematological: Negative  Psychiatric/Behavioral: Negative  All other systems reviewed and are negative  Objective:      /68 (BP Location: Left arm, Patient Position: Sitting, Cuff Size: Standard)   Pulse 63   Temp 98 1 °F (36 7 °C) (Tympanic)   Ht 5' 5" (1 651 m)   Wt 54 9 kg (121 lb)   LMP  (LMP Unknown)   SpO2 98%   BMI 20 14 kg/m²          Physical Exam  Constitutional:       Appearance: She is well-developed  HENT:      Head: Atraumatic  Right Ear: External ear normal       Left Ear: External ear normal    Eyes:      Conjunctiva/sclera: Conjunctivae normal       Pupils: Pupils are equal, round, and reactive to light  Neck:      Musculoskeletal: Normal range of motion  Cardiovascular:      Rate and Rhythm: Normal rate and regular rhythm  Heart sounds: Normal heart sounds  Pulmonary:      Effort: Pulmonary effort is normal  No respiratory distress  Breath sounds: Normal breath sounds  Abdominal:      General: Bowel sounds are normal  There is no distension  Palpations: Abdomen is soft  Tenderness: There is abdominal tenderness  There is no guarding or rebound  Comments: Right lower quadrant tenderness   Musculoskeletal: Normal range of motion  Skin:     General: Skin is warm and dry  Neurological:      Mental Status: She is alert and oriented to person, place, and time  Cranial Nerves: No cranial nerve deficit  Psychiatric:         Behavior: Behavior normal          Thought Content:  Thought content normal          Judgment: Judgment normal

## 2020-09-18 ENCOUNTER — HOSPITAL ENCOUNTER (OUTPATIENT)
Dept: RADIOLOGY | Facility: HOSPITAL | Age: 70
Discharge: HOME | End: 2020-09-18
Payer: MEDICARE

## 2020-09-18 DIAGNOSIS — M79.671 RIGHT FOOT PAIN: ICD-10-CM

## 2020-09-18 PROCEDURE — 73630 X-RAY EXAM OF FOOT: CPT

## 2020-09-22 ENCOUNTER — HOSPITAL ENCOUNTER (OUTPATIENT)
Dept: ULTRASOUND IMAGING | Facility: HOSPITAL | Age: 70
Discharge: HOME | End: 2020-09-22
Payer: MEDICARE

## 2020-09-22 DIAGNOSIS — R10.813 RIGHT LOWER QUADRANT ABDOMINAL TENDERNESS WITHOUT REBOUND TENDERNESS: ICD-10-CM

## 2020-09-22 PROCEDURE — 76705 ECHO EXAM OF ABDOMEN: CPT

## 2020-09-24 DIAGNOSIS — K82.4 POLYP OF GALLBLADDER: Primary | ICD-10-CM

## 2020-10-09 ENCOUNTER — TRANSCRIBE ORDERS (OUTPATIENT)
Dept: ADMINISTRATIVE | Facility: HOSPITAL | Age: 70
End: 2020-10-09

## 2020-10-09 DIAGNOSIS — M84.373G: Primary | ICD-10-CM

## 2020-10-13 ENCOUNTER — HOSPITAL ENCOUNTER (OUTPATIENT)
Dept: MRI IMAGING | Facility: HOSPITAL | Age: 70
Discharge: HOME | End: 2020-10-13
Attending: PODIATRIST
Payer: MEDICARE

## 2020-10-13 DIAGNOSIS — M84.373G: ICD-10-CM

## 2020-10-13 PROCEDURE — G1004 CDSM NDSC: HCPCS

## 2020-10-13 PROCEDURE — 73718 MRI LOWER EXTREMITY W/O DYE: CPT

## 2020-10-15 ENCOUNTER — CLINICAL SUPPORT (OUTPATIENT)
Dept: FAMILY MEDICINE CLINIC | Facility: CLINIC | Age: 70
End: 2020-10-15
Payer: MEDICARE

## 2020-10-15 DIAGNOSIS — E53.8 VITAMIN B 12 DEFICIENCY: Primary | ICD-10-CM

## 2020-10-15 RX ADMIN — CYANOCOBALAMIN 1000 MCG: 1000 INJECTION INTRAMUSCULAR; SUBCUTANEOUS at 10:09

## 2020-10-26 ENCOUNTER — LAB (OUTPATIENT)
Dept: LAB | Facility: MEDICAL CENTER | Age: 70
End: 2020-10-26
Payer: MEDICARE

## 2020-10-26 ENCOUNTER — CONSULT (OUTPATIENT)
Dept: GASTROENTEROLOGY | Facility: MEDICAL CENTER | Age: 70
End: 2020-10-26
Payer: MEDICARE

## 2020-10-26 VITALS
BODY MASS INDEX: 20.16 KG/M2 | HEIGHT: 65 IN | DIASTOLIC BLOOD PRESSURE: 74 MMHG | TEMPERATURE: 98 F | SYSTOLIC BLOOD PRESSURE: 120 MMHG | HEART RATE: 86 BPM | WEIGHT: 121 LBS

## 2020-10-26 DIAGNOSIS — K82.4 POLYP OF GALLBLADDER: ICD-10-CM

## 2020-10-26 DIAGNOSIS — K59.00 CONSTIPATION, UNSPECIFIED CONSTIPATION TYPE: ICD-10-CM

## 2020-10-26 DIAGNOSIS — K59.00 CONSTIPATION, UNSPECIFIED CONSTIPATION TYPE: Primary | ICD-10-CM

## 2020-10-26 DIAGNOSIS — R10.31 RIGHT LOWER QUADRANT ABDOMINAL PAIN: ICD-10-CM

## 2020-10-26 LAB — IGA SERPL-MCNC: 252 MG/DL (ref 70–400)

## 2020-10-26 PROCEDURE — 99203 OFFICE O/P NEW LOW 30 MIN: CPT | Performed by: INTERNAL MEDICINE

## 2020-10-26 PROCEDURE — 83516 IMMUNOASSAY NONANTIBODY: CPT

## 2020-10-26 PROCEDURE — 36415 COLL VENOUS BLD VENIPUNCTURE: CPT

## 2020-10-26 PROCEDURE — 82784 ASSAY IGA/IGD/IGG/IGM EACH: CPT

## 2020-10-27 LAB — TTG IGA SER-ACNC: <2 U/ML (ref 0–3)

## 2020-10-29 ENCOUNTER — TELEPHONE (OUTPATIENT)
Dept: GASTROENTEROLOGY | Facility: CLINIC | Age: 70
End: 2020-10-29

## 2020-11-05 ENCOUNTER — TELEPHONE (OUTPATIENT)
Dept: FAMILY MEDICINE CLINIC | Facility: CLINIC | Age: 70
End: 2020-11-05

## 2020-11-06 ENCOUNTER — OFFICE VISIT (OUTPATIENT)
Dept: FAMILY MEDICINE CLINIC | Facility: CLINIC | Age: 70
End: 2020-11-06
Payer: MEDICARE

## 2020-11-06 VITALS
HEIGHT: 65 IN | DIASTOLIC BLOOD PRESSURE: 70 MMHG | BODY MASS INDEX: 20.66 KG/M2 | SYSTOLIC BLOOD PRESSURE: 110 MMHG | WEIGHT: 124 LBS
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
## 2020-11-06 PROCEDURE — 99213 OFFICE O/P EST LOW 20 MIN: CPT | Performed by: FAMILY MEDICINE

## 2020-11-06 RX ORDER — AZITHROMYCIN 250 MG/1
TABLET, FILM COATED ORAL
Qty: 6 TABLET | Refills: 0 | Status: SHIPPED | OUTPATIENT
Start: 2020-11-06 | End: 2020-11-11

## 2020-11-06 RX ORDER — PREDNISONE 20 MG/1
40 TABLET ORAL DAILY
Qty: 10 TABLET | Refills: 0 | Status: SHIPPED | OUTPATIENT
Start: 2020-11-06 | End: 2020-11-11

## 2020-11-13 ENCOUNTER — TELEPHONE (OUTPATIENT)
Dept: GASTROENTEROLOGY | Facility: AMBULARY SURGERY CENTER | Age: 70
End: 2020-11-13

## 2020-11-13 DIAGNOSIS — R10.31 RLQ ABDOMINAL PAIN: Primary | ICD-10-CM

## 2020-11-18 ENCOUNTER — OFFICE VISIT (OUTPATIENT)
Dept: FAMILY MEDICINE CLINIC | Facility: CLINIC | Age: 70
End: 2020-11-18
Payer: MEDICARE

## 2020-11-18 VITALS
BODY MASS INDEX: 20.66 KG/M2 | OXYGEN SATURATION: 99 % | DIASTOLIC BLOOD PRESSURE: 64 MMHG | SYSTOLIC BLOOD PRESSURE: 110 MMHG | HEART RATE: 67 BPM | WEIGHT: 124 LBS | HEIGHT: 65 IN

## 2020-11-18 DIAGNOSIS — E53.8 VITAMIN B 12 DEFICIENCY: ICD-10-CM

## 2020-11-18 DIAGNOSIS — J45.909 UNCOMPLICATED ASTHMA, UNSPECIFIED ASTHMA SEVERITY, UNSPECIFIED WHETHER PERSISTENT: ICD-10-CM

## 2020-11-18 DIAGNOSIS — F33.41 RECURRENT MAJOR DEPRESSIVE DISORDER, IN PARTIAL REMISSION (HCC): Primary | ICD-10-CM

## 2020-11-18 DIAGNOSIS — E78.5 HYPERLIPIDEMIA, UNSPECIFIED HYPERLIPIDEMIA TYPE: ICD-10-CM

## 2020-11-18 DIAGNOSIS — Z23 ENCOUNTER FOR IMMUNIZATION: ICD-10-CM

## 2020-11-18 DIAGNOSIS — E55.9 VITAMIN D DEFICIENCY: ICD-10-CM

## 2020-11-18 PROCEDURE — 99214 OFFICE O/P EST MOD 30 MIN: CPT | Performed by: FAMILY MEDICINE

## 2020-11-18 PROCEDURE — G0008 ADMIN INFLUENZA VIRUS VAC: HCPCS

## 2020-11-18 PROCEDURE — 90662 IIV NO PRSV INCREASED AG IM: CPT

## 2020-11-18 RX ORDER — BUPROPION HYDROCHLORIDE 150 MG/1
150 TABLET ORAL EVERY MORNING
Qty: 30 TABLET | Refills: 0 | Status: SHIPPED | OUTPATIENT
Start: 2020-11-18 | End: 2021-02-17

## 2020-11-18 RX ORDER — CYANOCOBALAMIN 1000 UG/ML
1000 INJECTION INTRAMUSCULAR; SUBCUTANEOUS
Status: DISCONTINUED | OUTPATIENT
Start: 2020-11-18 | End: 2021-09-14 | Stop reason: ALTCHOICE

## 2020-11-18 RX ADMIN — CYANOCOBALAMIN 1000 MCG: 1000 INJECTION INTRAMUSCULAR; SUBCUTANEOUS at 10:12

## 2020-11-20 RX ADMIN — CYANOCOBALAMIN 1000 MCG: 1000 INJECTION INTRAMUSCULAR; SUBCUTANEOUS at 10:25

## 2020-11-22 ENCOUNTER — TELEPHONE (OUTPATIENT)
Dept: OTHER | Facility: OTHER | Age: 70
End: 2020-11-22

## 2020-11-28 ENCOUNTER — HOSPITAL ENCOUNTER (OUTPATIENT)
Dept: CT IMAGING | Facility: HOSPITAL | Age: 70
Discharge: HOME | End: 2020-11-28
Payer: MEDICARE

## 2020-11-28 DIAGNOSIS — R10.31 RLQ ABDOMINAL PAIN: ICD-10-CM

## 2020-11-28 PROCEDURE — 74177 CT ABD & PELVIS W/CONTRAST: CPT

## 2020-11-28 PROCEDURE — G1004 CDSM NDSC: HCPCS

## 2020-11-28 RX ADMIN — IOHEXOL 100 ML: 350 INJECTION, SOLUTION INTRAVENOUS at 15:26

## 2021-01-20 ENCOUNTER — TELEPHONE (OUTPATIENT)
Dept: FAMILY MEDICINE CLINIC | Facility: CLINIC | Age: 71
End: 2021-01-20

## 2021-01-21 NOTE — TELEPHONE ENCOUNTER
Patient called in and l/m stating she was wondering if there is any problems with her getting the Covid vaccine and having allergies , per DR Bessie Garcia she should be fine but if she has a Epi pen she should take that along with her and they will be asking her questions before giving the vaccine and they most likely will watch over her for a longer time after her injection due to her allergies   Patient verbally aware

## 2021-02-17 ENCOUNTER — OFFICE VISIT (OUTPATIENT)
Dept: FAMILY MEDICINE CLINIC | Facility: CLINIC | Age: 71
End: 2021-02-17
Payer: MEDICARE

## 2021-02-17 VITALS
WEIGHT: 125 LBS | DIASTOLIC BLOOD PRESSURE: 82 MMHG | HEIGHT: 65 IN | HEART RATE: 76 BPM | SYSTOLIC BLOOD PRESSURE: 124 MMHG | BODY MASS INDEX: 20.83 KG/M2

## 2021-02-17 DIAGNOSIS — R10.813 RIGHT LOWER QUADRANT ABDOMINAL TENDERNESS WITHOUT REBOUND TENDERNESS: ICD-10-CM

## 2021-02-17 DIAGNOSIS — M79.671 RIGHT FOOT PAIN: Primary | ICD-10-CM

## 2021-02-17 DIAGNOSIS — E46 PROTEIN-CALORIE MALNUTRITION, UNSPECIFIED SEVERITY (HCC): ICD-10-CM

## 2021-02-17 DIAGNOSIS — F33.41 RECURRENT MAJOR DEPRESSIVE DISORDER, IN PARTIAL REMISSION (HCC): ICD-10-CM

## 2021-02-17 DIAGNOSIS — G57.61 MORTON'S NEUROMA OF RIGHT FOOT: ICD-10-CM

## 2021-02-17 PROCEDURE — 99214 OFFICE O/P EST MOD 30 MIN: CPT | Performed by: FAMILY MEDICINE

## 2021-02-17 RX ORDER — GABAPENTIN 300 MG/1
300 CAPSULE ORAL 3 TIMES DAILY
Qty: 90 CAPSULE | Refills: 1 | Status: SHIPPED | OUTPATIENT
Start: 2021-02-17 | End: 2021-03-22

## 2021-02-17 RX ADMIN — CYANOCOBALAMIN 1000 MCG: 1000 INJECTION INTRAMUSCULAR; SUBCUTANEOUS at 10:14

## 2021-02-17 NOTE — PROGRESS NOTES
Assessment/Plan:     72-year-old woman with:  right foot pain and Wynne's neuroma, RLQ abdominal pain, protein calorie malnutrition major depressive disorder  Discussed workup and treatment options with risks and benefits will refer for 2nd opinion will begin trial of gabapentin will check labs and check a pelvic ultrasound as well discussed supportive care return parameters and follow-up in 1 month    No problem-specific Assessment & Plan notes found for this encounter  Diagnoses and all orders for this visit:    Right foot pain  -     Ambulatory referral to Podiatry; Future  -     gabapentin (NEURONTIN) 300 mg capsule; Take 1 capsule (300 mg total) by mouth 3 (three) times a day  -     Comprehensive metabolic panel; Future  -     CBC and differential; Future  -     TSH, 3rd generation with Free T4 reflex; Future  -     C-reactive protein; Future  -     Lyme Antibody Profile with reflex to WB; Future    Wynne's neuroma of right foot  -     Ambulatory referral to Podiatry; Future  -     gabapentin (NEURONTIN) 300 mg capsule; Take 1 capsule (300 mg total) by mouth 3 (three) times a day  -     Comprehensive metabolic panel; Future  -     CBC and differential; Future  -     TSH, 3rd generation with Free T4 reflex; Future  -     C-reactive protein; Future  -     Lyme Antibody Profile with reflex to WB; Future    Protein-calorie malnutrition, unspecified severity ([facility] [address] )  -     Comprehensive metabolic panel; Future  -     CBC and differential; Future  -     TSH, 3rd generation with Free T4 reflex; Future  -     C-reactive protein; Future  -     Lyme Antibody Profile with reflex to WB; Future    Recurrent major depressive disorder, in partial remission ([facility] [facility] )  -     Comprehensive metabolic panel; Future  -     CBC and differential; Future  -     TSH, 3rd generation with Free T4 reflex; Future  -     C-reactive protein; Future  -     Lyme Antibody Profile with reflex to WB;  Future    Right lower quadrant abdominal tenderness without rebound tenderness  -     US pelvis complete non OB; Future  -     gabapentin (NEURONTIN) 300 mg capsule; Take 1 capsule (300 mg total) by mouth 3 (three) times a day  -     Comprehensive metabolic panel; Future  -     CBC and differential; Future  -     TSH, 3rd generation with Free T4 reflex; Future  -     C-reactive protein; Future  -     Lyme Antibody Profile with reflex to WB; Future          Subjective:     Chief Complaint   Patient presents with    Foot Pain     right foot    Results        Patient ID: Melyssa Leon is a 73 y o  female  Patient is a 72-year-old woman with follow-up for right foot pain and Wynne's neuroma, RLQ abdominal pain, protein calorie malnutrition major depressive disorder  Patient admits that her pain is not getting better and she would like additional workup and referral for a 2nd opinion no fevers chills nausea vomiting tolerating p o  intake      The following portions of the patient's history were reviewed and updated as appropriate: allergies, current medications, past family history, past medical history, past social history, past surgical history and problem list     Review of Systems   Constitutional: Negative  HENT: Negative  Eyes: Negative  Respiratory: Negative  Cardiovascular: Negative  Gastrointestinal: Positive for abdominal pain  Endocrine: Negative  Genitourinary: Negative  Musculoskeletal: Positive for arthralgias and myalgias  Allergic/Immunologic: Negative  Neurological: Negative  Hematological: Negative  Psychiatric/Behavioral: Negative  All other systems reviewed and are negative  Objective:      /82   Pulse 76   Ht 5' 5" (1 651 m)   Wt 56 7 kg (125 lb)   LMP  (LMP Unknown)   BMI 20 80 kg/m²          Physical Exam  Constitutional:       Appearance: She is well-developed  HENT:      Head: Atraumatic        Right Ear: External ear normal       Left Ear: External ear normal    Eyes: Conjunctiva/sclera: Conjunctivae normal       Pupils: Pupils are equal, round, and reactive to light  Neck:      Musculoskeletal: Normal range of motion  Cardiovascular:      Rate and Rhythm: Normal rate and regular rhythm  Heart sounds: Normal heart sounds  Pulmonary:      Effort: Pulmonary effort is normal  No respiratory distress  Breath sounds: Normal breath sounds  Abdominal:      General: There is no distension  Palpations: Abdomen is soft  Tenderness: There is no abdominal tenderness  There is no guarding or rebound  Musculoskeletal: Normal range of motion  Skin:     General: Skin is warm and dry  Neurological:      Mental Status: She is alert and oriented to person, place, and time  Cranial Nerves: No cranial nerve deficit  Psychiatric:         Behavior: Behavior normal          Thought Content:  Thought content normal          Judgment: Judgment normal

## 2021-03-02 ENCOUNTER — TELEPHONE (OUTPATIENT)
Dept: VASCULAR SURGERY | Facility: CLINIC | Age: 71
End: 2021-03-02

## 2021-03-02 NOTE — PATIENT INSTRUCTIONS
Varicose Veins   WHAT YOU NEED TO KNOW:   What are varicose veins? Varicose veins are veins that become large, twisted, and swollen  They are common on the back of the calves, knees, and thighs  Varicose veins are caused by valves in your veins that do not work properly  This causes blood to collect and increase pressure in the veins of your legs  The increased pressure causes your veins to stretch, get larger, swell, and twist        What increases my risk for varicose veins? · Pregnancy    · A family history of varicose veins    · Being overweight or obese    · Age 48 years or older    · Sitting or standing for long periods of time    · Wearing tight clothing  What are the signs and symptoms of varicose veins? Your symptoms may be worse after you stand or sit for long periods of time  You may have any of the following:  · Blue, purple, or bulging veins in your legs     · Pain, swelling, or muscle cramps in your legs    · Feeling of fatigue or heaviness in your legs  How are varicose veins diagnosed? Your healthcare provider will examine your legs and ask about your medical history  You may need tests, such as a Doppler ultrasound or duplex scan  These tests show your veins and valves, and how your blood is flowing through them  These tests may also show if there is a blockage or blood clot  How are varicose veins treated? The goal of treatment is to decrease symptoms, improve appearance, and prevent further problems  Treatment will depend on which veins are affected and how severe your condition is  You may need procedures to treat or remove your varicose veins  For example, your healthcare provider may inject a solution or use a laser to close the varicose veins  Surgery to remove long veins may also be done  Ask your healthcare provider for more information about procedures used to treat varicose veins  What can I do to manage my symptoms? · Do not sit or stand for long periods of time    This can cause the blood to collect in your legs and make your symptoms worse  Bend or rotate your ankles several times every hour  Walk around for a few minutes every hour to get blood moving in your legs  · Do not cross your legs when you sit  This decreases blood flow to your feet and can make your symptoms worse  · Do not wear tight clothing or shoes  Do not wear high-heeled shoes  Do not wear clothes that are tight around the waist or knees  · Maintain a healthy weight  Being overweight or obese can make your varicose veins worse  Ask your healthcare provider how much you should weigh  Ask him or her to help you create a weight loss plan if you are overweight  · Wear pressure stockings as directed  The stockings are tight and put pressure on your legs  They improve blood flow and help prevent clots  · Elevate your legs  Keep them above the level of your heart for 15 to 30 minutes several times a day  You can also prop the end of your bed up slightly to elevate your legs while you sleep  This will help blood to flow back to your heart  · Get regular exercise  Talk to your healthcare provider about the best exercise plan for you  Exercise can improve blood flow to your legs and feet  When should I seek immediate care? · You have a wound that does not heal or is infected  · You have an injury that has broken your skin and caused your varicose veins to bleed  · Your leg is swollen and hard  · You notice that your legs or feet are turning blue or black  · Your leg feels warm, tender, and painful  It may look swollen and red  When should I contact my healthcare provider? · You have pain in your leg that does not go away or gets worse  · You notice sudden large bruising on your legs  · You have a rash on your leg  · Your symptoms keep you from doing your daily activities  · You have questions or concerns about your condition or care    CARE AGREEMENT:   You have the right to help plan your care  Learn about your health condition and how it may be treated  Discuss treatment options with your caregivers to decide what care you want to receive  You always have the right to refuse treatment  The above information is an  only  It is not intended as medical advice for individual conditions or treatments  Talk to your doctor, nurse or pharmacist before following any medical regimen to see if it is safe and effective for you  © 2017 [address]  Information is for End User's use only and may not be sold, redistributed or otherwise used for commercial purposes  All illustrations and images included in CareNotes® are the copyrighted property of [employer]  or [employer]  -recommend continue conservative measures to include daily use of prescription compression stockings, leg elevation, low-salt diet, aerobic activity, weight management and lotion to leg to help promote good skin health  -place your compression stockings on in the morning and remove prior to bed  -it is unclear if all your symptoms are related to your known varicose veins with venous incompetence  Your right foot and right ankle pain may be more orthopedic and musculoskeletal in nature    Regardless, we will investigate this further with repeat reflux study and follow-up with surgeon  -we will schedule you for a repeat lower extremity venous reflux study to assess the function of your valves in your veins to help guide treatment options  -return to office with surgeon after reflux study for re-evaluation   -please contact the office in the interim with any questions, concerns or new symptoms

## 2021-03-02 NOTE — TELEPHONE ENCOUNTER

## 2021-03-02 NOTE — ASSESSMENT & PLAN NOTE
72-year-old female former smoker HLD, hx L breast cancer '03, s/p lumpectomy, R foot Wynne's neuroma and longstanding symptomatic BLE varicose veins, R>L, with GSV incompetence and hx of R calf SVT '18  Symptoms unrelieved with conservative measures and previously scheduled for R GSV EVLT by Dr Tish Willett 10/5/18 but cancelled by patient for personal reasons  Patient now presents on referral from Podiatry due to chronic right medial malleolar and right foot pain x 6 months  MRI 10/13/2020 demonstrates R Wynne's neuroma but concern regarding palpable subcutaneous nodule and "calcification" identified on recent plain radiograph in podiatrist's office  No report or image for review  R foot xray 9/18/2020 w/heel spur but no other identified osteophyte or arterial calcification    Patient pressing for further evaluation despite knowledge that vascular intervention unlikely to resolve R ankle and foot pain   -continue conservative measures to include daily use of compression stockings, lower extremity elevation, low-sodium diet, aerobic activity, weight management and skin moisturization  -will obtain updated LEVDR  -return to office with Dr Tish Willett with [facility] for re-evaluation  -instructed to contact the office in the interim with any questions, concerns or new symptoms

## 2021-03-03 ENCOUNTER — OFFICE VISIT (OUTPATIENT)
Dept: VASCULAR SURGERY | Facility: CLINIC | Age: 71
End: 2021-03-03
Payer: MEDICARE

## 2021-03-03 VITALS
RESPIRATION RATE: 18 BRPM | SYSTOLIC BLOOD PRESSURE: 122 MMHG | WEIGHT: 127 LBS | BODY MASS INDEX: 21.16 KG/M2 | DIASTOLIC BLOOD PRESSURE: 76 MMHG | HEIGHT: 65 IN | HEART RATE: 80 BPM

## 2021-03-03 DIAGNOSIS — G89.29 CHRONIC PAIN OF RIGHT ANKLE: ICD-10-CM

## 2021-03-03 DIAGNOSIS — G57.61 MORTON'S NEUROMA OF RIGHT FOOT: ICD-10-CM

## 2021-03-03 DIAGNOSIS — E78.5 HYPERLIPIDEMIA, UNSPECIFIED HYPERLIPIDEMIA TYPE: ICD-10-CM

## 2021-03-03 DIAGNOSIS — I83.893 SYMPTOMATIC VARICOSE VEINS OF BOTH LOWER EXTREMITIES: Primary | ICD-10-CM

## 2021-03-03 DIAGNOSIS — M25.571 CHRONIC PAIN OF RIGHT ANKLE: ICD-10-CM

## 2021-03-03 DIAGNOSIS — I83.93 VARICOSE VEINS OF BOTH LOWER EXTREMITIES, UNSPECIFIED WHETHER COMPLICATED: ICD-10-CM

## 2021-03-03 PROCEDURE — 99214 OFFICE O/P EST MOD 30 MIN: CPT | Performed by: PHYSICIAN ASSISTANT

## 2021-03-03 NOTE — PROGRESS NOTES
Assessment/Plan:    Symptomatic varicose veins of both lower extremities  42-year-old female former smoker HLD, hx L breast cancer '03, s/p lumpectomy, R foot Wynne's neuroma and longstanding symptomatic BLE varicose veins, R>L, with GSV incompetence and hx of R calf SVT '18  Symptoms unrelieved with conservative measures and previously scheduled for R GSV EVLT by Dr Claudean Husk 10/5/18 but cancelled by patient for personal reasons  Patient now presents on referral from Podiatry due to chronic right medial malleolar and right foot pain x 6 months  MRI 10/13/2020 demonstrates R Wynne's neuroma but concern regarding palpable subcutaneous nodule and "calcification" identified on recent plain radiograph in podiatrist's office  No report or image for review  R foot xray 9/18/2020 w/heel spur but no other identified osteophyte or arterial calcification    Patient pressing for further evaluation despite knowledge that vascular intervention unlikely to resolve R ankle and foot pain   -continue conservative measures to include daily use of compression stockings, lower extremity elevation, low-sodium diet, aerobic activity, weight management and skin moisturization  -will obtain updated LEVDR  -return to office with Dr Claudean Husk with [facility] for re-evaluation  -instructed to contact the office in the interim with any questions, concerns or new symptoms    Hyperlipidemia  -stable  -continue diet and lifestyle modification  -management per PCP    Wynne's neuroma of right foot  -s/p injection in Fall '20  -persistent symptoms  -continue follow-up with established podiatrist    Chronic pain of right ankle  Point tenderness inferior to right medial malleolus at site of palpable tiny nodule  -likely musculoskeletal in nature and unclear if underlying venous incompetence contributing to symptoms  -see plan as outlined above       Diagnoses and all orders for this visit:    Symptomatic varicose veins of both lower extremities  - VAS Lower extremity venous insufficiency duplex, Single leg w/ measurements; Future    Hyperlipidemia, unspecified hyperlipidemia type    Varicose veins of both lower extremities, unspecified whether complicated  -     Ambulatory referral to Vascular Surgery    Wynne's neuroma of right foot    Chronic pain of right ankle    Other orders  -     Cancel: Compression Stocking          Subjective:      Patient ID: Branden Almanzar is a 73 y o  female  Patient was referred back to our practice for a calcium deposit in the RLE  Pt c/o Pt states that she was in a boot for the 1 month due to a neuroma  Pt states that she is in constant pain and that it wakes her up at night  Pt denies open wounds  Pt continues to wear compression stockings and elevate her legs  53-year-old female former smoker HLD, hx L breast cancer '03, s/p lumpectomy, R foot Wynne's neuroma and longstanding symptomatic BLE varicose veins, R>L, with GSV incompetence and hx of R calf SVT '18  RLE symptoms unrelieved with conservative measures and previously scheduled for R GSV EVLT by Dr Bryson Hinton 10/5/18 but cancelled by patient for personal reasons  Patient now presents on referral from Podiatry due to chronic right medial malleolar and right foot pain x 6 months  Patient's prior right lower extremity symptoms of heaviness, aching, burning persist but not her primary complaint  Patient also complains right lower quadrant/hip pain produced by lying down in bed which radiates to her foot and right lower extremity cramping at night without rest pain  No claudication or tissue loss  No recurrent superficial thrombophlebitis  Patient denies prior history DVT, PE, hypercoagulable disorder, bleeding varicosities, venous ulcerations, recurrent lower extremity cellulitis or stasis dermatitis  GI workup essentially negative with the exception of descending and sigmoid colon diverticulosis    Contrast CT abdomen/pelvis 11/28/20 with diverticulosis as identified and calcifications in the wall of aortoiliac segments but no significant stenosis  Prominent left adnexal and ovarian veins  MRI 10/13/2020 demonstrates R Wynne's neuroma but concern regarding palpable subcutaneous nodule and "calcification" identified on recent plain radiograph in podiatrist's office  No report or image for review  R foot xray 9/18/2020 w/heel spur but no other identified osteophyte or arterial calcification  Patient pressing for further evaluation despite knowledge that vascular intervention unlikely to resolve R ankle and foot pain  The following portions of the patient's history were reviewed and updated as appropriate: allergies, current medications, past family history, past medical history, past social history, past surgical history and problem list     Review of Systems   Constitutional: Negative  HENT: Negative  Eyes: Negative  Respiratory: Negative  Cardiovascular: Negative  Painful veins   Gastrointestinal: Negative  Endocrine: Negative  Genitourinary: Negative  Musculoskeletal: Negative for back pain and joint swelling  Foot pain   Skin: Negative  Allergic/Immunologic: Negative  Neurological: Negative  Hematological: Negative  Psychiatric/Behavioral: Negative  I have reviewed and made appropriate changes to the review of systems input by the medical assistant      Vitals:    03/03/21 1030   BP: 122/76   BP Location: Left arm   Patient Position: Sitting   Pulse: 80   Resp: 18   Weight: 57 6 kg (127 lb)   Height: 5' 5" (1 651 m)       Patient Active Problem List   Diagnosis    Recurrent major depressive disorder, in partial remission (HCC)    Hyperlipidemia    Pleural thickening    Symptomatic varicose veins of both lower extremities    Vitamin D deficiency    Changing skin lesion    Asthma    Other acute recurrent sinusitis    Dermatitis    Superficial phlebitis of leg, right    Medicare annual wellness visit, subsequent    Left knee injury    Left shoulder pain    Acute upper respiratory infection    Cerumen impaction    Depression    Right foot pain    RLQ abdominal tenderness    Bug bite    Protein-calorie malnutrition (HCC)    Wynne's neuroma of right foot    Chronic pain of right ankle       Past Surgical History:   Procedure Laterality Date    BREAST BIOPSY Right 2005    benign    BREAST LUMPECTOMY Left 2003    phyllodes    TONSILLECTOMY      WISDOM TOOTH EXTRACTION         Family History   Problem Relation Age of Onset    Uterine cancer Mother     Varicose Veins Mother     Hypertension Mother     Dysfunctional uterine bleeding Mother     Endometrial cancer Mother 80    COPD Father     Heart disease Father         Cardiac disorder    Heart attack Brother     Diabetes Family     COPD Sister     No Known Problems Maternal Grandmother     No Known Problems Maternal Grandfather     No Known Problems Paternal Grandmother     No Known Problems Paternal Grandfather        Social History     Socioeconomic History    Marital status: Single     Spouse name: Not on file    Number of children: Not on file    Years of education: Not on file    Highest education level: Some college, no degree   Occupational History    Occupation: retired   Social Needs    Financial resource strain: Not hard at all   [place] insecurity     Worry: Never true     Inability: Never true    Transportation needs     Medical: No     Non-medical: No   Tobacco Use    Smoking status: Former Smoker     Quit date:      Years since quittin 1    Smokeless tobacco: Never Used   Substance and Sexual Activity    Alcohol use:  Yes     Alcohol/week: 1 0 standard drinks     Types: 1 Glasses of wine per week     Frequency: 2-4 times a month     Drinks per session: 1 or 2     Comment: occasional    Drug use: No    Sexual activity: Not Currently   Lifestyle    Physical activity     Days per week: 7 days Minutes per session: 30 min    Stress: Only a little   Relationships    Social connections     Talks on phone: More than three times a week     Gets together:  Three times a week     Attends [employer] service: More than 4 times per year     Active member of club or organization: No     Attends meetings of clubs or organizations: Never     Relationship status:     Intimate partner violence     Fear of current or ex partner: No     Emotionally abused: No     Physically abused: No     Forced sexual activity: No   Other Topics Concern    Not on file   Social History Narrative    Always uses seatbelt        Caffeine use consumes 2 cups of coffee per day           Allergies   Allergen Reactions    Ambrosia Artemisiifolia (Ragweed) Skin Test     Bee Venom Hives    Cat Hair Extract     Codeine      Pt stated not allergic      Dog Epithelium Allergy Skin Test     Mold Extract  [Trichophyton]     Penicillins     Soy Isoflavones     Wasp Venom Protein          Current Outpatient Medications:     albuterol (PROVENTIL HFA,VENTOLIN HFA) 90 mcg/act inhaler, Inhale 1 puff as needed for wheezing, Disp: 8 5 Inhaler, Rfl: 0    Loratadine-Pseudoephedrine (ALAVERT ALLERGY/SINUS PO), Take by mouth, Disp: , Rfl:     EPINEPHrine (EPIPEN) 0 3 mg/0 3 mL SOAJ, Inject 0 3 mL (0 3 mg total) into a muscle once for 1 dose, Disp: 0 6 mL, Rfl: 0    gabapentin (NEURONTIN) 300 mg capsule, Take 1 capsule (300 mg total) by mouth 3 (three) times a day, Disp: 90 capsule, Rfl: 1    Current Facility-Administered Medications:     cyanocobalamin injection 1,000 mcg, 1,000 mcg, Intramuscular, Q30 Days, Valeria Fragoso MD, 1,000 mcg at 02/17/21 1014    cyanocobalamin injection 1,000 mcg, 1,000 mcg, Intramuscular, Q30 Days, Valeria Fragoso MD, 1,000 mcg at 11/20/20 1025    Objective:    RLE    RLE    R medial ankle      /76 (BP Location: Left arm, Patient Position: Sitting)   Pulse 80   Resp 18   Ht 5' 5" (1 651 m)   Wt 57 6 kg (127 lb)   LMP  (LMP Unknown)   BMI 21 13 kg/m²          Physical Exam  Vitals signs and nursing note reviewed  Constitutional:       General: She is not in acute distress  Appearance: She is well-developed  HENT:      Head: Normocephalic and atraumatic  Eyes:      General: No scleral icterus  Conjunctiva/sclera: Conjunctivae normal       Pupils: Pupils are equal, round, and reactive to light  Neck:      Musculoskeletal: Normal range of motion and neck supple  Thyroid: No thyromegaly  Vascular: No carotid bruit or JVD  Trachea: No tracheal deviation  Cardiovascular:      Rate and Rhythm: Normal rate and regular rhythm  Pulses:           Radial pulses are 2+ on the right side and 2+ on the left side  Femoral pulses are 2+ on the right side and 2+ on the left side  Popliteal pulses are 1+ on the right side and 1+ on the left side  Dorsalis pedis pulses are 2+ on the right side and 2+ on the left side  Heart sounds: S1 normal and S2 normal  No murmur  No friction rub  No gallop  No S3 sounds  Comments: Bilateral lower extremities warm, pink, motor and sensory intact and well perfused without cyanosis, pallor, rubor, hemosiderin staining, venous ulcerations, lipodermatosclerosis or tissue loss  Bilateral lower extremities scattered truncal and reticular varicosities throughout with cluster of superficial reticular varicosities right medial malleolus as well as right anterior shin, posterior calf and medial thigh  See Clinical images above  Pulmonary:      Effort: No respiratory distress  Breath sounds: Normal breath sounds  No stridor  No wheezing, rhonchi or rales  Abdominal:      General: Bowel sounds are normal  There is no distension or abdominal bruit  Palpations: Abdomen is soft  There is no mass or pulsatile mass  Tenderness: There is no abdominal tenderness  There is no rebound     Musculoskeletal: Normal range of motion  General: No swelling or deformity  Right lower leg: No edema  Left lower leg: No edema  Comments: Tiny palpable nodule just inferior to right medial malleolus which is tender with palpation  Tenderness with palpation of plantar aspect of the right forefoot  No tissue loss  Skin:     General: Skin is warm and dry  Coloration: Skin is not pale  Findings: No erythema or lesion  Neurological:      General: No focal deficit present  Mental Status: She is alert and oriented to person, place, and time  Psychiatric:         Mood and Affect: Mood normal          Thought Content:  Thought content normal

## 2021-03-03 NOTE — ASSESSMENT & PLAN NOTE
Point tenderness inferior to right medial malleolus at site of palpable tiny nodule  -likely musculoskeletal in nature and unclear if underlying venous incompetence contributing to symptoms  -see plan as outlined above

## 2021-03-10 DIAGNOSIS — Z23 ENCOUNTER FOR IMMUNIZATION: ICD-10-CM

## 2021-03-11 ENCOUNTER — OFFICE VISIT (OUTPATIENT)
Dept: FAMILY MEDICINE CLINIC | Facility: CLINIC | Age: 71
End: 2021-03-11
Payer: MEDICARE

## 2021-03-11 ENCOUNTER — APPOINTMENT (OUTPATIENT)
Dept: RADIOLOGY | Facility: MEDICAL CENTER | Age: 71
End: 2021-03-11
Payer: MEDICARE

## 2021-03-11 VITALS
TEMPERATURE: 97.6 F | SYSTOLIC BLOOD PRESSURE: 122 MMHG | BODY MASS INDEX: 20.97 KG/M2 | WEIGHT: 126 LBS | HEART RATE: 88 BPM | OXYGEN SATURATION: 98 % | DIASTOLIC BLOOD PRESSURE: 70 MMHG

## 2021-03-11 DIAGNOSIS — S89.91XA INJURY OF RIGHT KNEE, INITIAL ENCOUNTER: ICD-10-CM

## 2021-03-11 DIAGNOSIS — G57.61 MORTON'S NEUROMA OF RIGHT FOOT: Primary | ICD-10-CM

## 2021-03-11 DIAGNOSIS — G57.61 MORTON'S NEUROMA OF RIGHT FOOT: ICD-10-CM

## 2021-03-11 PROCEDURE — 99213 OFFICE O/P EST LOW 20 MIN: CPT | Performed by: FAMILY MEDICINE

## 2021-03-11 PROCEDURE — 73564 X-RAY EXAM KNEE 4 OR MORE: CPT

## 2021-03-11 RX ORDER — METHOCARBAMOL 500 MG/1
500 TABLET, FILM COATED ORAL
Qty: 30 TABLET | Refills: 0 | Status: SHIPPED | OUTPATIENT
Start: 2021-03-11 | End: 2021-06-09

## 2021-03-11 RX ORDER — PREDNISONE 20 MG/1
40 TABLET ORAL DAILY
Qty: 10 TABLET | Refills: 0 | Status: SHIPPED | OUTPATIENT
Start: 2021-03-11 | End: 2021-03-16

## 2021-03-12 ENCOUNTER — HOSPITAL ENCOUNTER (OUTPATIENT)
Dept: NON INVASIVE DIAGNOSTICS | Facility: CLINIC | Age: 71
Discharge: HOME | End: 2021-03-12
Payer: MEDICARE

## 2021-03-12 DIAGNOSIS — I83.893 SYMPTOMATIC VARICOSE VEINS OF BOTH LOWER EXTREMITIES: ICD-10-CM

## 2021-03-12 PROCEDURE — 93971 EXTREMITY STUDY: CPT

## 2021-03-12 NOTE — PROGRESS NOTES
Assessment/Plan:     80-year-old woman with:   Wynne's neuroma and right foot pain along with right knee injury will check x-rays and give steroid burst will refer to physical therapy discussed heat and cold and refer to orthopedics and give muscle relaxer for breakthrough symptoms advised her to call back if not improving worsening    No problem-specific Assessment & Plan notes found for this encounter  Diagnoses and all orders for this visit:    Wynne's neuroma of right foot  -     XR knee 4+ vw right injury; Future  -     predniSONE 20 mg tablet; Take 2 tablets (40 mg total) by mouth daily for 5 days  -     Ambulatory referral to Physical Therapy; Future  -     methocarbamol (ROBAXIN) 500 mg tablet; Take 1 tablet (500 mg total) by mouth daily at bedtime as needed for muscle spasms  -     Ambulatory referral to Orthopedic Surgery; Future    Injury of right knee, initial encounter  -     XR knee 4+ vw right injury; Future  -     predniSONE 20 mg tablet; Take 2 tablets (40 mg total) by mouth daily for 5 days  -     Ambulatory referral to Physical Therapy; Future  -     methocarbamol (ROBAXIN) 500 mg tablet; Take 1 tablet (500 mg total) by mouth daily at bedtime as needed for muscle spasms          Subjective:     Chief Complaint   Patient presents with    Knee Pain     rt side, edema, painful, denies trauma to area        Patient ID: Ama Deal is a 73 y o  female  Patient is a 80-year-old woman who presents for follow-up on the right foot pain and Wynne's neuroma along with right knee injury she admits that her pain significantly worsened no fevers chills nausea vomiting no weakness numbness or tingling      The following portions of the patient's history were reviewed and updated as appropriate: allergies, current medications, past family history, past medical history, past social history, past surgical history and problem list     Review of Systems   Constitutional: Negative  HENT: Negative  Eyes: Negative  Respiratory: Negative  Cardiovascular: Negative  Gastrointestinal: Negative  Endocrine: Negative  Genitourinary: Negative  Musculoskeletal: Positive for arthralgias and myalgias  Allergic/Immunologic: Negative  Neurological: Negative  Hematological: Negative  Psychiatric/Behavioral: Negative  All other systems reviewed and are negative  Objective:      /70   Pulse 88   Temp 97 6 °F (36 4 °C)   Wt 57 2 kg (126 lb)   LMP  (LMP Unknown)   SpO2 98%   BMI 20 97 kg/m²          Physical Exam  Constitutional:       Appearance: She is well-developed  HENT:      Head: Atraumatic  Right Ear: External ear normal       Left Ear: External ear normal    Eyes:      Conjunctiva/sclera: Conjunctivae normal       Pupils: Pupils are equal, round, and reactive to light  Neck:      Musculoskeletal: Normal range of motion  Pulmonary:      Effort: Pulmonary effort is normal  No respiratory distress  Abdominal:      General: There is no distension  Musculoskeletal: Normal range of motion  Skin:     General: Skin is warm and dry  Neurological:      Mental Status: She is alert and oriented to person, place, and time  Cranial Nerves: No cranial nerve deficit  Psychiatric:         Behavior: Behavior normal          Thought Content:  Thought content normal          Judgment: Judgment normal

## 2021-03-15 ENCOUNTER — TELEPHONE (OUTPATIENT)
Dept: FAMILY MEDICINE CLINIC | Facility: CLINIC | Age: 71
End: 2021-03-15

## 2021-03-15 ENCOUNTER — EVALUATION (OUTPATIENT)
Dept: PHYSICAL THERAPY | Facility: CLINIC | Age: 71
End: 2021-03-15
Payer: MEDICARE

## 2021-03-15 DIAGNOSIS — G57.61 MORTON'S NEUROMA OF RIGHT FOOT: ICD-10-CM

## 2021-03-15 DIAGNOSIS — S89.91XA INJURY OF RIGHT KNEE, INITIAL ENCOUNTER: ICD-10-CM

## 2021-03-15 PROCEDURE — 97162 PT EVAL MOD COMPLEX 30 MIN: CPT | Performed by: PHYSICAL THERAPIST

## 2021-03-15 PROCEDURE — 93971 EXTREMITY STUDY: CPT | Performed by: SURGERY

## 2021-03-15 PROCEDURE — 97110 THERAPEUTIC EXERCISES: CPT | Performed by: PHYSICAL THERAPIST

## 2021-03-15 NOTE — TELEPHONE ENCOUNTER
PATIENT IS GOING FOR A COVID SHOT ON Wednesday MORNING, SHE WANTS TO KNOW IF SHE SHOULD STOP TAKING THE PREDNISONE

## 2021-03-15 NOTE — PROGRESS NOTES
PT Evaluation     Today's date: 3/15/2021  Patient name: Star Willis  : 1950  MRN: [id number]  Referring provider: Octaviano Kulkarni MD  Dx:   Encounter Diagnosis     ICD-10-CM    1  Wynne's neuroma of right foot  G57 [id number] Ambulatory referral to Physical Therapy   2  Injury of right knee, initial encounter  S89  [id number] Ambulatory referral to Physical Therapy                  Assessment  Assessment details: Star Willis is a pleasant 73 y o  female who presents with signs and symptoms correlating with referring diagnosis  No further referral appears necessary at this time based upon examination results  The patient's greatest concerns are decreasing pain in the foot and the knee  She presents with a movement impairment diagnosis of hypomobile knee flexion ROM and a mechanical diagnosis of knee derangement with an extension bias  This also presents with decreased knee A/PROM, no strength limitations, swelling in the popliteal fossa (signs of possible baker's cyst), and overall TTP posterior to the medial malleoli, surrounding the 2-3rd digit, and anterior fibular muscles  Negative prognostic indicators: insidious onset  Positive prognostic indicators: great motivation and previous history with PT  Please contact me if you have any further questions or recommendations  Thank you very much for the kind referral       Impairments: abnormal or restricted ROM, abnormal movement, activity intolerance, impaired physical strength and pain with function    Symptom irritability: moderateUnderstanding of Dx/Px/POC: good   Prognosis: good    Goals  STGs  1  Decrease pain by 20% in 2-4 weeks  2  Improve knee ROM by 10 degrees in 2-4 weeks  3  Improve knee and ankle strength by 1/3 grade in 2-4 weeks  LTGs  1  Decrease pain by 60% in 6-8 weeks  2  Improve walking tolerance to >30 minutes in 6-8 weeks  3  Perform ADLs without pain in 6-8 weeks          Plan  Patient would benefit from: skilled physical therapy  Referral necessary: No  Planned modality interventions: cryotherapy, thermotherapy: hydrocollator packs and TENS  Planned therapy interventions: manual therapy, therapeutic training, stretching, strengthening, therapeutic activities, therapeutic exercise, patient education, activity modification, neuromuscular re-education and home exercise program  Frequency: 2x week  Duration in weeks: 8  Treatment plan discussed with: patient        Subjective Evaluation    History of Present Illness  Mechanism of injury: Pt is a 73 y o  female presenting w/ a chapa's neuroma and R knee pain  The ankle started 6 months ago, and was initially diagnosed as an ankle fracture and was casted for 1 month  Following this they discovered it was not a fx and deemed it a chapa's neuroma  Since then she struggles with Qiro and started to develop acute knee pain and swelling insidiously  She states the swelling has been reducing, but is still present in the posterior popliteal fossa as a lump       Neurological signs: numbness between toes   Red Flags: none   PMH: L knee pain           Not a recurrent problem   Quality of life: good    Pain  Current pain rating: 10  At best pain ratin  At worst pain rating: 10  Quality: tight, throbbing, sharp, discomfort and burning  Relieving factors: medications  Aggravating factors: stair climbing, walking, standing and lifting  Progression: no change    Social Support  Lives with: alone    Employment status: not working  Hand dominance: right    Patient Goals  Patient goals for therapy: increased strength, decreased pain, increased motion and independence with ADLs/IADLs          Objective     Active Range of Motion   Left Knee   Flexion: 140 degrees   Extension: 0 degrees     Right Knee   Flexion: 110 degrees with pain  Extension: -2 degrees   Left Ankle/Foot   Normal active range of motion    Right Ankle/Foot   Normal active range of motion    Additional Active Range of Motion Details  Observation: poor weight bearing tolerance into weight bearing  Functional squat: 20 degrees before pain   Balance: unable to balance on RLE without pain  No loss of strength in LE   Palpation: increased swelling in baker's cyst type pattern in popliteal fossa  TTP to fibularis muscles and posterior to medial malleoli   TTP to 2nd and 3rd digit     Passive Range of Motion     Right Knee   Flexion: 120 degrees with pain    Additional Passive Range of Motion Details  PROM into extension, obstruction felt  Following repeated mechanical extension decreased pain and improved knee flexion to 135 and extension without discomfort    Strength/Myotome Testing     Left Ankle/Foot   Normal strength    Right Ankle/Foot   Dorsiflexion: 4+  Plantar flexion: 4+  Inversion: 3+  Eversion: 3+  Great toe flexion: 4+  Great toe extension: 4+            Precautions: none    Daily Treatment Diary    Date 3/15            FOTO IE            Re-Eval IE               Manuals                                                        Neuro Re-Ed     Doming  HEP            Toe intrinsics  HEP            Marbles              Towel crunches              Plantar fascia stretch                                        Ther Ex    Bike              gastroc stretch standing              Repeated knee extension sitting with op  HEP            Hamstring stretch              Step ups              HR/TR standing              Side steps                           Ther Activity                                                        Modalities

## 2021-03-16 ENCOUNTER — TELEPHONE (OUTPATIENT)
Dept: FAMILY MEDICINE CLINIC | Facility: CLINIC | Age: 71
End: 2021-03-16

## 2021-03-16 NOTE — TELEPHONE ENCOUNTER
Pt called in and notified her about the  Message from Dr Jain Check that she should stop prednisone    Pt is having covid vaccine

## 2021-03-17 DIAGNOSIS — S89.91XA INJURY OF RIGHT KNEE, INITIAL ENCOUNTER: ICD-10-CM

## 2021-03-17 DIAGNOSIS — G57.61 MORTON'S NEUROMA OF RIGHT FOOT: ICD-10-CM

## 2021-03-17 RX ORDER — PREDNISONE 20 MG/1
40 TABLET ORAL DAILY
Qty: 10 TABLET | Refills: 0 | Status: CANCELLED | OUTPATIENT
Start: 2021-03-17 | End: 2021-03-22

## 2021-03-18 ENCOUNTER — APPOINTMENT (OUTPATIENT)
Dept: PHYSICAL THERAPY | Facility: CLINIC | Age: 71
End: 2021-03-18
Payer: MEDICARE

## 2021-03-18 NOTE — TELEPHONE ENCOUNTER
Pt recommended to wait until she finishes COVID vaccine series to ensure that she gets the best immune response

## 2021-03-18 NOTE — TELEPHONE ENCOUNTER
Spoke with patient  She is not comfortable taking during the day, as it already does make her groggy and she does drive

## 2021-03-18 NOTE — TELEPHONE ENCOUNTER
Patient can take the muscle relaxer 3 times daily as long she is not driving on at or combining with other sedating medications if this is not sufficient let us know

## 2021-03-18 NOTE — TELEPHONE ENCOUNTER
Patient has been icing knee, using NSAIDS, and elevating leg  She still has no relief  She understands that she needs to wait until after COVID vaccine course for other steroid, but is wondering what she can do until then  Please advise  Thank you

## 2021-03-19 ENCOUNTER — OFFICE VISIT (OUTPATIENT)
Dept: PHYSICAL THERAPY | Facility: CLINIC | Age: 71
End: 2021-03-19
Payer: MEDICARE

## 2021-03-19 DIAGNOSIS — S89.91XA INJURY OF RIGHT KNEE, INITIAL ENCOUNTER: ICD-10-CM

## 2021-03-19 DIAGNOSIS — G57.61 MORTON'S NEUROMA OF RIGHT FOOT: Primary | ICD-10-CM

## 2021-03-19 PROCEDURE — 97110 THERAPEUTIC EXERCISES: CPT

## 2021-03-19 PROCEDURE — 97112 NEUROMUSCULAR REEDUCATION: CPT

## 2021-03-22 ENCOUNTER — OFFICE VISIT (OUTPATIENT)
Dept: PHYSICAL THERAPY | Facility: CLINIC | Age: 71
End: 2021-03-22
Payer: MEDICARE

## 2021-03-22 ENCOUNTER — OFFICE VISIT (OUTPATIENT)
Dept: VASCULAR SURGERY | Facility: CLINIC | Age: 71
End: 2021-03-22
Payer: MEDICARE

## 2021-03-22 ENCOUNTER — TELEPHONE (OUTPATIENT)
Dept: VASCULAR SURGERY | Facility: CLINIC | Age: 71
End: 2021-03-22

## 2021-03-22 VITALS
BODY MASS INDEX: 20.83 KG/M2 | RESPIRATION RATE: 18 BRPM | DIASTOLIC BLOOD PRESSURE: 76 MMHG | WEIGHT: 125 LBS | HEART RATE: 86 BPM | TEMPERATURE: 99 F | SYSTOLIC BLOOD PRESSURE: 110 MMHG | HEIGHT: 65 IN

## 2021-03-22 DIAGNOSIS — I83.893 SYMPTOMATIC VARICOSE VEINS OF BOTH LOWER EXTREMITIES: Primary | ICD-10-CM

## 2021-03-22 DIAGNOSIS — S89.91XA INJURY OF RIGHT KNEE, INITIAL ENCOUNTER: ICD-10-CM

## 2021-03-22 DIAGNOSIS — G57.61 MORTON'S NEUROMA OF RIGHT FOOT: Primary | ICD-10-CM

## 2021-03-22 PROCEDURE — 99213 OFFICE O/P EST LOW 20 MIN: CPT | Performed by: SURGERY

## 2021-03-22 PROCEDURE — 97110 THERAPEUTIC EXERCISES: CPT

## 2021-03-22 RX ORDER — ACETAMINOPHEN 325 MG/1
650 TABLET ORAL EVERY 6 HOURS PRN
COMMUNITY

## 2021-03-22 RX ORDER — CHLORHEXIDINE GLUCONATE 0.12 MG/ML
15 RINSE ORAL ONCE
Status: CANCELLED | OUTPATIENT
Start: 2021-09-14 | End: 2021-03-22

## 2021-03-22 NOTE — PATIENT INSTRUCTIONS
Symptomatic varicose veins of both lower extremities    Symptomatic varicose veins right lower extremity  We discussed the findings on recent duplex evaluation along with the pathophysiology of venous disease, the indications for treatment and the treatment options available with their associated risks and benefits  Unfortunately she continues to have symptoms despite compressive therapy and would like to proceed with venous ablation and phlebectomy which will be scheduled in the near future

## 2021-03-22 NOTE — PROGRESS NOTES
Assessment/Plan:    Symptomatic varicose veins of both lower extremities    Symptomatic varicose veins right lower extremity  We discussed the findings on recent duplex evaluation along with the pathophysiology of venous disease, the indications for treatment and the treatment options available with their associated risks and benefits  Unfortunately she continues to have symptoms despite compressive therapy and would like to proceed with venous ablation and phlebectomy which will be scheduled in the near future  Diagnoses and all orders for this visit:    Symptomatic varicose veins of both lower extremities    Other orders  -     acetaminophen (TYLENOL) 325 mg tablet; Take 650 mg by mouth every 6 (six) hours as needed for mild pain          Subjective:      Patient ID: Gonzales Green is a 73 y o  female  Patient presents in office to review the results of her LEVDR done on 3/12/2021  Patient reports having R leg pain that wakes her at night x 6 month  Patient reports getting up and walking around helps subside the pain  Patient reports she goes to physical therapy twice a week for the R leg  Patient reports she is still wearing her compression stockings daily  Patient denies any wounds or ulcers  44-year-old has a long history of symptomatic varicose veins more severe on the right as compared to left  She also has a history of right calf vein phlebitis  She has been utilizing compressive stockings for long period time and in fact had previously been scheduled for venous ablation and phlebectomy but because of personal reasons had to cancel  Specifically 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  She continues to note discomfort in her left calf in the area of varicosities which worsens throughout the day and does improve with elevation and compression but despite all these maneuvers continues to cause significant discomfort    In addition she does have some discomfort in her foot and ankle from a neuroma which is being cared for by Podiatry  Venous reflux study 03/12/2021 with incompetency of the right greater saphenous vein  The following portions of the patient's history were reviewed and updated as appropriate: allergies, current medications, past family history, past medical history, past social history, past surgical history and problem list     I have reviewed and made appropriate changes to the review of systems input by the medical assistant      Vitals:    03/22/21 1309   BP: 110/76   BP Location: Right arm   Patient Position: Sitting   Cuff Size: Adult   Pulse: 86   Resp: 18   Temp: 99 °F (37 2 °C)   TempSrc: Tympanic   Weight: 56 7 kg (125 lb)   Height: 5' 5" (1 651 m)       Patient Active Problem List   Diagnosis    Recurrent major depressive disorder, in partial remission (HCC)    Hyperlipidemia    Pleural thickening    Symptomatic varicose veins of both lower extremities    Vitamin D deficiency    Changing skin lesion    Asthma    Other acute recurrent sinusitis    Dermatitis    Superficial phlebitis of leg, right    Medicare annual wellness visit, subsequent    Left knee injury    Left shoulder pain    Acute upper respiratory infection    Cerumen impaction    Depression    Right foot pain    RLQ abdominal tenderness    Bug bite    Protein-calorie malnutrition (HCC)    Wynne's neuroma of right foot    Chronic pain of right ankle    Right knee injury       Past Surgical History:   Procedure Laterality Date    BREAST BIOPSY Right 2005    benign    BREAST LUMPECTOMY Left 2003    phyllodes    TONSILLECTOMY      WISDOM TOOTH EXTRACTION         Family History   Problem Relation Age of Onset    Uterine cancer Mother     Varicose Veins Mother     Hypertension Mother     Dysfunctional uterine bleeding Mother     Endometrial cancer Mother 80    COPD Father     Heart disease Father         Cardiac disorder    Heart attack Brother     Diabetes Family     COPD Sister     No Known Problems Maternal Grandmother     No Known Problems Maternal Grandfather     No Known Problems Paternal Grandmother     No Known Problems Paternal Grandfather        Social History     Socioeconomic History    Marital status: Single     Spouse name: Not on file    Number of children: Not on file    Years of education: Not on file    Highest education level: Some college, no degree   Occupational History    Occupation: retired   Social Needs    Financial resource strain: Not hard at all   [place] insecurity     Worry: Never true     Inability: Never true   [employer] needs     Medical: No     Non-medical: No   Tobacco Use    Smoking status: Former Smoker     Quit date:      Years since quittin 2    Smokeless tobacco: Never Used   Substance and Sexual Activity    Alcohol use: Yes     Alcohol/week: 1 0 standard drinks     Types: 1 Glasses of wine per week     Frequency: 2-4 times a month     Drinks per session: 1 or 2     Comment: occasional    Drug use: No    Sexual activity: Not Currently   Lifestyle    Physical activity     Days per week: 7 days     Minutes per session: 30 min    Stress: Only a little   Relationships    Social connections     Talks on phone: More than three times a week     Gets together:  Three times a week     Attends Christianity service: More than 4 times per year     Active member of club or organization: No     Attends meetings of clubs or organizations: Never     Relationship status:     Intimate partner violence     Fear of current or ex partner: No     Emotionally abused: No     Physically abused: No     Forced sexual activity: No   Other Topics Concern    Not on file   Social History Narrative    Always uses seatbelt        Caffeine use consumes 2 cups of coffee per day           Allergies   Allergen Reactions    Ambrosia Artemisiifolia (Ragweed) Skin Test     Bee Venom Hives    Cat Hair Extract     Codeine Pt stated not allergic      Dog Epithelium Allergy Skin Test     Mold Extract  [Trichophyton]     Penicillins     Soy Isoflavones     Wasp Venom Protein          Current Outpatient Medications:     acetaminophen (TYLENOL) 325 mg tablet, Take 650 mg by mouth every 6 (six) hours as needed for mild pain, Disp: , Rfl:     albuterol (PROVENTIL HFA,VENTOLIN HFA) 90 mcg/act inhaler, Inhale 1 puff as needed for wheezing, Disp: 8 5 Inhaler, Rfl: 0    EPINEPHrine (EPIPEN) 0 3 mg/0 3 mL SOAJ, Inject 0 3 mL (0 3 mg total) into a muscle once for 1 dose, Disp: 0 6 mL, Rfl: 0    methocarbamol (ROBAXIN) 500 mg tablet, Take 1 tablet (500 mg total) by mouth daily at bedtime as needed for muscle spasms (Patient not taking: Reported on 3/22/2021), Disp: 30 tablet, Rfl: 0    Current Facility-Administered Medications:     cyanocobalamin injection 1,000 mcg, 1,000 mcg, Intramuscular, Q30 Days, Solis Mejia MD, 1,000 mcg at 02/17/21 1014    cyanocobalamin injection 1,000 mcg, 1,000 mcg, Intramuscular, Q30 Days, Solis Mejia MD, 1,000 mcg at 11/20/20 1025    Review of Systems   Constitutional: Negative  Negative for chills and fever  HENT: Negative  Eyes: Negative  Respiratory: Negative  Negative for cough, chest tightness and shortness of breath  Cardiovascular: Positive for leg swelling (R leg)  Gastrointestinal: Negative  Negative for diarrhea, nausea and vomiting  Endocrine: Negative  Genitourinary: Negative  Musculoskeletal: Positive for gait problem  Skin: Negative  Negative for wound  Allergic/Immunologic: Negative  Neurological: Positive for numbness (R foot)  Negative for dizziness, speech difficulty, weakness and headaches  Hematological: Bruises/bleeds easily  Psychiatric/Behavioral: Negative  Negative for self-injury and suicidal ideas           Objective:      /76 (BP Location: Right arm, Patient Position: Sitting, Cuff Size: Adult)   Pulse 86   Temp 99 °F (37 2 °C) (Tympanic)   Resp 18   Ht 5' 5" (1 651 m)   Wt 56 7 kg (125 lb)   LMP  (LMP Unknown)   BMI 20 80 kg/m²          Physical Exam  Constitutional:       Appearance: She is well-developed  HENT:      Head: Normocephalic and atraumatic  Eyes:      Pupils: Pupils are equal, round, and reactive to light  Neck:      Musculoskeletal: Normal range of motion and neck supple  Vascular: No carotid bruit or JVD  Cardiovascular:      Rate and Rhythm: Normal rate and regular rhythm  Pulses:           Dorsalis pedis pulses are 2+ on the right side and 2+ on the left side  Heart sounds: No murmur  Comments:   Bilateral lower extremities with varicose veins  On the right prominent varicosities mostly in the posterior aspect of the calf and the anterior shin and ankle range in size from 3- 5 mm  Pulmonary:      Effort: Pulmonary effort is normal  No respiratory distress  Breath sounds: Normal breath sounds  Musculoskeletal: Normal range of motion  General: No tenderness  Skin:     General: Skin is warm and dry  Comments: Areas of skin her hyper pigmented mostly at the level of the ankle consistent with chronic venous insufficiency  Neurological:      Mental Status: She is alert and oriented to person, place, and time  Sensory: No sensory deficit  EVLT Operative Scheduling Information:    Hospital:  [facility]    Physician:  [address] [place] [address]    Surgery:   Right venous ablation and phlebectomy    Urgency:  Standard    Level:  Level 4: Outpatients to be scheduled for screening procedures and elective surgery that can be delayed for longer than one month without reasonable expectation of detriment to patient      Case Length:  Normal    Post-op Bed:  Outpatient    OR Table:  Standard    Equipment Needs:  Vascular technologist    Medication Instructions:  None    Hydration:  No    Venous Clinical Severity Scores (VCSS)  Item Absent   (0 points) Mild   (1 point) Moderate (2 points) Severe   (3 points)   Pain [] None [] Occasional [x] Daily [] Daily limiting   Varicose veins [] None [] Few [x] Calf or thigh [] Calf and thigh   Venous edema [] None [] Foot and ankle [x] Above ankle, below knee [] To knee of above   Skin pigmentation [] None [x] Perimalleolar [] Diffuse, lower 1/3 calf [] Wider, above lower 1/3 calf   Inflammation [] None [] Perimalleolar [] Diffuse, lower 1/3 calf [] Wider, above lower 1/3 calf   Induration [] None [] Perimalleolar [] Diffuse, lower 1/3 calf [] Wider, above lower 1/3 calf   No  active ulcers [] None [] 1 [] 2 [] ? 3   Active ulcer size [] None [] <2 cm [] 2 - 6 cm [] >6 cm   Ulcer duration [] None [] <3 months [] 3 - 12 months [] >1 year   Compression therapy [] None [] Intermittent [] Most days [x] Fully comply   Total           CEAP Clinical Classification  [x] Symptomatic   [] Asymptomatic     [] Class 0 No visible or palpable signs of venous disease   [] Class 1 Telangiectasies or reticular veins   [] Class 2 Varicose veins; distinguished from reticular veins by a diameter of 3mm or more   [] Class 3 Edema   [x] Class 4 Changes in skin and subcutaneous tissue secondary to CVD    [] Class 4a Pigmentation or eczema   [] Class 4b Lipodermatosclerosis or atrophie craig   [] Class 5 Healed venous ulcer   [] Class 6 Active venous ulcer

## 2021-03-22 NOTE — LETTER
RE: Medicare ID [id number]    To whom it may concern:    Patient, Zhanna Wallis (1950), has a routine procedure scheduled on 21 at [facility] (Tax: [id number] / NPI: [id number]) [place] with Dr Urvashi Martins (NPI: [id number])  We are requesting authorization for one (1) unit each of outpatient CPT code(s) F003980, U0605860, and 60063  Patient's VCSS = 10 and CEAP Classification = Class 4  Please review the attached clinical documentation and provide your determination  Please Note:  The right greater saphenous vein will be treated with CPT code 74609  The right anterior accessory saphenous vein will be treated with CPT code 54295  The right lower extremity tributaries will be treated with CPT code 07878  Should you have any questions or concerns regarding the details of this case, please do not hesitate to reach out  Respectfully,      Casey Cuellar  Prior Authorization & Referral    [facility] Center  Direct Line: [phone]  Direct Fax: [phone]  [email or web address]  [email or web address]                                Please Do Not Copy        Prior [address] Outpatient Procedure  Medicare Part A Fax/Mail Cover Sheet    Complete all fields; attach supporting medical documentation and fax to [phone] or mail to the applicable address/number provided at the bottom of the page  Complete ONE (1) Medicare Fax/ Mail Cover Sheet for each prior authorization request for which documentation is being submitted      Beneficiary Last Name  J aCrlos Light First Name  Nas Mccall    MEDICARE ID  [id number] Gender  [] Male [x] Female   1950    Facility/Agency NPIs  [id number] CMS Certification Number  [id number]   Facility Name and Address  [address] [place] [address] [address] [facility]   Provider's NPI  [id number] Provider's CMS Certification Number   [id number]   Provider's Name and Address  Licha Croft [address] [facility], [address] [facility]     Requestor Name  Иван Rosa Requestor Phone Number   [phone]   Requestor Fax Number/Email address  [phone] / [email or web address] Procedure Code(s)   I6422828, M3636569, & 42045 [Right]   Paired Code(s) for Botulinum Toxin Injections                                                                                                  Not Applicable (N/A)    Diagnosis Codes (providers who submit using esMD must include diagnosis code(s)):  I83 893   Start Date of Authorization  07/13/21 State (location) of [facility] Units of Service  One (1) unit of each code   Request Completed by: (please print and sign)   Иван Rosa Date  06/16/21        This document is intended solely for the use of the individual or entity to which it is addressed and may contain information that is privileged, confidential, and exempt from disclosure under applicable law  If the reader of this notice is not the intended recipient or individual responsible for delivering the message to the intended recipient, you are hereby advised that any dissemination, distribution or copying of this information is strictly prohibited  If you receive this communication in error, please advise us by telephone and destroy these papers

## 2021-03-22 NOTE — ASSESSMENT & PLAN NOTE
Symptomatic varicose veins right lower extremity  We discussed the findings on recent duplex evaluation along with the pathophysiology of venous disease, the indications for treatment and the treatment options available with their associated risks and benefits  Unfortunately she continues to have symptoms despite compressive therapy and would like to proceed with venous ablation and phlebectomy which will be scheduled in the near future

## 2021-03-22 NOTE — PROGRESS NOTES
Daily Note     Today's date: 3/22/2021  Patient name: Palak Garcia  : 1950  MRN: [id number]  Referring provider: Gordy Dean MD  Dx:   Encounter Diagnosis     ICD-10-CM    1  Wynne's neuroma of right foot  G57 61    2  Injury of right knee, initial encounter  S89  91XA                   Subjective: Pt presents to PT reporting increased pain in R foot after last session stating it made it difficult to sleep for two nights  Pt believes the marble  was the reason for the increased pain  Pt denies pain post PT session but notes numbness between digits 2 & 3  Objective: See treatment diary below      Assessment: Pt demonstrates good tolerance to TE today however some TE held secondary to pain last visit  Patient demonstrated fatigue post treatment, exhibited good technique with therapeutic exercises and would benefit from continued PT  Plan: Continue per plan of care        Precautions: none    Daily Treatment Diary    Date 3/15 3/19 3/22          FOTO IE            Re-Eval IE               Manuals                                                        Neuro Re-Ed     Doming  HEP 5" x 10 5" x 10          Toe intrinsics  HEP 5" x 10 held          Marbles   x1 hold          Towel crunches   1' hold          Plantar fascia stretch   20" x 5 20" x 5                                    Ther Ex    Bike   5' 5'          gastroc stretch standing   20" x 5 20" x 5          Repeated knee extension sitting with op  HEP 1" x 15 1" x 15          Hamstring stretch - seated  20" x 5 20" x 5          Step ups   1R x10 ea B 1R 2 x10 ea B          HR/TR standing   15x ea 20x ea          Side steps   3 laps  *rtb around calves held                       Ther Activity                                                        Modalities

## 2021-03-22 NOTE — LETTER
March 22, 2021     Fred Billy, [address]    Patient: Tesha Angulo   YOB: 1950   Date of Visit: 3/22/2021       Dear Dr Carley De León: Thank you for referring Tesha Angulo to me for evaluation  Below are the relevant portions of my assessment and plan of care  Symptomatic varicose veins of both lower extremities    Symptomatic varicose veins right lower extremity  We discussed the findings on recent duplex evaluation along with the pathophysiology of venous disease, the indications for treatment and the treatment options available with their associated risks and benefits  Unfortunately she continues to have symptoms despite compressive therapy and would like to proceed with venous ablation and phlebectomy which will be scheduled in the near future  If you have questions, please do not hesitate to call me  I look forward to following Davina Capellan along with you           Sincerely,        Barby Gomez MD        CC: No Recipients

## 2021-03-22 NOTE — TELEPHONE ENCOUNTER
Check out staff:   REMINDER: Under Reason For Call, comments MUST be formatted as:   (Surgeon's Initials) / (Procedure)    PHYSICIAN / HOSPITAL: Diogo Singer (NPI: [id number]) / [facility] (Tax: [id number] / NPI: [id number])    PROCEDURE: Right venous ablation and phlebectomy    LEVEL: 4    REP: Vascular technologist    ASSISTANT SURGEON: No    ALLERGIES: Ambrosia artemisiifolia (ragweed) skin test, Bee venom, Cat hair extract, Codeine, Dog epithelium allergy skin test, Mold extract  [trichophyton], Penicillins, Soy isoflavones, and Wasp venom protein    INSTRUCTIONS GIVEN: NO BOWEL PREP    BLOOD THINNERS / MED HOLD: Patient is not taking any blood thinners  HYDRATION REQUIRED: Patient does not require hydration  DIALYSIS: Patient is not on dialysis  *Please ROUTE this encounter to [facility] [place] Surgery Coordinator   AND   Send COMPLETE CONSENT to Vascular Surgery Schedulers e-mail group*    I certify that patient has signed, printed, timed, and dated their surgery consent  I certify that BOTH sides of the completed surgery consent have been scanned into the patient's Epic chart by myself on 3/22/2021  *Please ROUTE this encounter to [facility] Clearance Pool   AND   Send CLEARANCE FORM(S) to Vascular Nursing e-mail group*    Patient does not require any pre operative clearance

## 2021-03-25 ENCOUNTER — OFFICE VISIT (OUTPATIENT)
Dept: PHYSICAL THERAPY | Facility: CLINIC | Age: 71
End: 2021-03-25
Payer: MEDICARE

## 2021-03-25 DIAGNOSIS — G57.61 MORTON'S NEUROMA OF RIGHT FOOT: Primary | ICD-10-CM

## 2021-03-25 DIAGNOSIS — S89.91XA INJURY OF RIGHT KNEE, INITIAL ENCOUNTER: ICD-10-CM

## 2021-03-25 PROCEDURE — 97140 MANUAL THERAPY 1/> REGIONS: CPT | Performed by: PHYSICAL THERAPIST

## 2021-03-25 PROCEDURE — 97110 THERAPEUTIC EXERCISES: CPT | Performed by: PHYSICAL THERAPIST

## 2021-03-25 NOTE — PROGRESS NOTES
Daily Note     Today's date: 3/25/2021  Patient name: Rosy Ibarra  : 1950  MRN: [id number]  Referring provider: Karla Fontenot MD  Dx:   Encounter Diagnosis     ICD-10-CM    1  Wynne's neuroma of right foot  G57 61    2  Injury of right knee, initial encounter  S89  91XA                   Subjective: Pt is reporting she is consistently completing all of her interventions at home without difficulty  Objective: See treatment diary below      Assessment: Tolerated treatment well  Patient during the session completed manual techniques to continue to mobilize the third and second met  She was also educated on new stretches and interventions to continue progression  Plan: Continue per plan of care        Precautions: none    Daily Treatment Diary    Date 3/15 3/19 3/22 3/25         FOTO IE            Re-Eval IE               Manuals    2nd and 3rd oscillation      [address]                                                Neuro Re-Ed     Doming  HEP 5" x 10 5" x 10 5" x 10         Toe intrinsics  HEP 5" x 10 held          Marbles   x1 hold          Towel crunches   1' hold 30"x3         Plantar fascia stretch   20" x 5 20" x 5          Resisted great toe flex    GTB 2x10                      Ther Ex    Bike   5' 5'          gastroc and soleus stretch standing   20" x 5 20" x 5 20" x 5          Repeated knee extension sitting with op  HEP 1" x 15 1" x 15          Hamstring stretch - seated  20" x 5 20" x 5          Step ups   1R x10 ea B 1R 2 x10 ea B          HR/TR standing   15x ea 20x ea          Side steps   3 laps  *rtb around calves held          Golf ball roll out    20"x4         Ther Activity                                                        Modalities

## 2021-03-25 NOTE — TELEPHONE ENCOUNTER
LMOM for patient to call back and schedule R EVLT w/phlebs with [facility] at [facility]  PT was offered 5/18 or 5/25 on message

## 2021-03-29 ENCOUNTER — OFFICE VISIT (OUTPATIENT)
Dept: PHYSICAL THERAPY | Facility: CLINIC | Age: 71
End: 2021-03-29
Payer: MEDICARE

## 2021-03-29 DIAGNOSIS — S89.91XA INJURY OF RIGHT KNEE, INITIAL ENCOUNTER: ICD-10-CM

## 2021-03-29 DIAGNOSIS — G57.61 MORTON'S NEUROMA OF RIGHT FOOT: Primary | ICD-10-CM

## 2021-03-29 PROCEDURE — 97112 NEUROMUSCULAR REEDUCATION: CPT

## 2021-03-29 PROCEDURE — 97110 THERAPEUTIC EXERCISES: CPT

## 2021-03-29 PROCEDURE — 97140 MANUAL THERAPY 1/> REGIONS: CPT

## 2021-03-29 NOTE — PROGRESS NOTES
Daily Note     Today's date: 3/29/2021  Patient name: Janny Pennington  : 1950  MRN: [id number]  Referring provider: Katie Henderson MD  Dx:   Encounter Diagnosis     ICD-10-CM    1  Wynne's neuroma of right foot  G57 61    2  Injury of right knee, initial encounter  S89  91XA                   Subjective: Pt presents to PT reporting no pain pre TE session  She reports positive response with manual therapy last session  Objective: See treatment diary below      Assessment: Tolerated treatment well with no complaints  Patient demonstrated fatigue post treatment, exhibited good technique with therapeutic exercises and would benefit from continued PT to increase flexibility, strength and function  Plan: Continue per plan of care        Precautions: none    Daily Treatment Diary    Date 3/15 3/19 3/22 3/25 3/29        FOTO IE            Re-Eval IE               Manuals    2nd and 3rd oscillation      [address]                                               Neuro Re-Ed     Doming  HEP 5" x 10 5" x 10 5" x 10 5" x 10        Toe intrinsics  HEP 5" x 10 held          Marbles   x1 hold          Towel crunches   1' hold 30"x3 30"x3        Plantar fascia stretch   20" x 5 20" x 5          Resisted great toe flex    GTB 2x10 GTB 2x10                     Ther Ex    Bike   5' 5'          gastroc and soleus stretch standing   20" x 5 20" x 5 20" x 5  20" x 5        Repeated knee extension sitting with op  HEP 1" x 15 1" x 15          Hamstring stretch - seated  20" x 5 20" x 5          Step ups   1R x10 ea B 1R 2 x10 ea B          HR/TR standing   15x ea 20x ea          Side steps   3 laps  *rtb around calves held          Golf ball roll out    20"x4 20"x4        Ther Activity                                                        Modalities

## 2021-04-01 ENCOUNTER — OFFICE VISIT (OUTPATIENT)
Dept: PHYSICAL THERAPY | Facility: CLINIC | Age: 71
End: 2021-04-01
Payer: MEDICARE

## 2021-04-01 DIAGNOSIS — G57.61 MORTON'S NEUROMA OF RIGHT FOOT: Primary | ICD-10-CM

## 2021-04-01 DIAGNOSIS — S89.91XA INJURY OF RIGHT KNEE, INITIAL ENCOUNTER: ICD-10-CM

## 2021-04-01 PROCEDURE — 97140 MANUAL THERAPY 1/> REGIONS: CPT

## 2021-04-01 PROCEDURE — 97110 THERAPEUTIC EXERCISES: CPT

## 2021-04-01 PROCEDURE — 97112 NEUROMUSCULAR REEDUCATION: CPT

## 2021-04-01 NOTE — PROGRESS NOTES
Daily Note     Today's date: 2021  Patient name: Donn Sanchez  : 1950  MRN: [id number]  Referring provider: Ashwini Carter MD  Dx:   Encounter Diagnosis     ICD-10-CM    1  Ricci's neuroma of right foot  G57 61    2  Injury of right knee, initial encounter  S89  91XA                   Subjective: Pt presents to PT reporting no complaints pre TE session  She reports the sneakers do not aggravate her neuroma  Pt reports no increased pain post PT session  Objective: See treatment diary below      Assessment: Tolerated treatment well performing TE that does not increase neuroma pain  Patient demonstrated fatigue post treatment, exhibited good technique with therapeutic exercises and would benefit from continued PT to increase flexibility, strength and function  Plan: Continue per plan of care        Precautions: none    Daily Treatment Diary    Date 3/15 3/19 3/22 3/25 3/29 4/1       FOTO IE     XX       Re-Eval IE               Manuals    2nd and 3rd oscillation     [address] PK                                              Neuro Re-Ed     Doming  HEP 5" x 10 5" x 10 5" x 10 5" x 10 5" x 10       Toe intrinsics  HEP 5" x 10 held          Marbles   x1 hold          Towel crunches   1' hold 30"x3 30"x3 30"x3       Plantar fascia stretch   20" x 5 20" x 5          Resisted great toe flex    GTB 2x10 GTB 2x10 GTB 2x10                    Ther Ex    Bike   5' 5'  5' 5'       gastroc and soleus stretch standing   20" x 5 20" x 5 20" x 5  20" x 5 20" x 5       Repeated knee extension sitting with op  HEP 1" x 15 1" x 15          Hamstring stretch - seated  20" x 5 20" x 5          Step ups   1R x10 ea B 1R 2 x10 ea B   1R 2 x10 ea B       HR/TR standing   15x ea 20x ea   20x ea       Side steps   3 laps  *rtb around calves held          Golf ball roll out    20"x4 20"x4 30" x 4       Ther Activity                                                        Modalities

## 2021-04-05 ENCOUNTER — OFFICE VISIT (OUTPATIENT)
Dept: PHYSICAL THERAPY | Facility: CLINIC | Age: 71
End: 2021-04-05
Payer: MEDICARE

## 2021-04-05 DIAGNOSIS — G57.61 MORTON'S NEUROMA OF RIGHT FOOT: Primary | ICD-10-CM

## 2021-04-05 DIAGNOSIS — S89.91XA INJURY OF RIGHT KNEE, INITIAL ENCOUNTER: ICD-10-CM

## 2021-04-05 PROCEDURE — 97112 NEUROMUSCULAR REEDUCATION: CPT

## 2021-04-05 PROCEDURE — 97110 THERAPEUTIC EXERCISES: CPT

## 2021-04-05 NOTE — PROGRESS NOTES
Daily Note     Today's date: 2021  Patient name: Pinky Polanco  : 1950  MRN: [id number]  Referring provider: Valery Broderick MD  Dx:   Encounter Diagnosis     ICD-10-CM    1  Wynne's neuroma of right foot  G57 61    2  Injury of right knee, initial encounter  S89  91XA                   Subjective: Pt presents to PT reporting pain post PT session or other activity at home  She states the pain is decreasing in intensity  Pt reports pain in R knee, ankle and knee when sleeping at night the pain wakes her up  She states she walks around for a little bit and the pain goes away  Pt reports some edema in R knee but it has decreased  Pt denies pain post PT session but states she will have pain later in the day  Objective: See treatment diary below      Assessment: Tolerated treatment well  Patient demonstrated fatigue post treatment, exhibited good technique with therapeutic exercises and would benefit from continued PT to increase flexibility, strength and function  Plan: Continue per plan of care        Precautions: none    Daily Treatment Diary    Date 3/15 3/19 3/22 3/25 3/29 4/1 4/5      FOTO IE     XX       Re-Eval IE               Manuals    2nd and 3rd oscillation     [address]                                              Neuro Re-Ed     Doming  HEP 5" x 10 5" x 10 5" x 10 5" x 10 5" x 10 5" x 10      Toe intrinsics  HEP 5" x 10 held          Marbles   x1 hold          Towel crunches   1' hold 30"x3 30"x3 30"x3 2 x 15      Plantar fascia stretch   20" x 5 20" x 5          Resisted great toe flex    GTB 2x10 GTB 2x10 GTB 2x10 GTB 2x10                   Ther Ex    Bike   5' 5'  5' 5' 5'      gastroc and soleus stretch standing   20" x 5 20" x 5 20" x 5  20" x 5 20" x 5 20" x 5      Repeated knee extension sitting with op  HEP 1" x 15 1" x 15          Hamstring stretch - seated  20" x 5 20" x 5          Step ups   1R x10 ea B 1R 2 x10 ea B   1R 2 x10 ea B 2R 2 x10 ea B      HR/TR standing 15x ea 20x ea   20x ea       Side steps   3 laps  *rtb around calves held          Golf ball roll out    20"x4 20"x4 30" x 4 @home      Ther Activity                                                        Modalities

## 2021-04-08 ENCOUNTER — OFFICE VISIT (OUTPATIENT)
Dept: PHYSICAL THERAPY | Facility: CLINIC | Age: 71
End: 2021-04-08
Payer: MEDICARE

## 2021-04-08 DIAGNOSIS — G57.61 MORTON'S NEUROMA OF RIGHT FOOT: Primary | ICD-10-CM

## 2021-04-08 DIAGNOSIS — S89.91XA INJURY OF RIGHT KNEE, INITIAL ENCOUNTER: ICD-10-CM

## 2021-04-08 PROCEDURE — 97110 THERAPEUTIC EXERCISES: CPT

## 2021-04-08 PROCEDURE — 97112 NEUROMUSCULAR REEDUCATION: CPT

## 2021-04-08 NOTE — PROGRESS NOTES
Daily Note     Today's date: 2021  Patient name: Scotty Alvarenga  : 1950  MRN: [id number]  Referring provider: Nathan Aranda MD  Dx:   Encounter Diagnosis     ICD-10-CM    1  Ricci's neuroma of right foot  G57 61    2  Injury of right knee, initial encounter  S89  91XA                   Subjective: Pt presents to PT reporting increased pain in knee that radiates down to R anterior ankle for unknown reason  She also reports increased pain and numbness in R toes, mainly where the neuroma is on plantar surface of foot  She reports heat helps to relieve pain and numbness  Pt reports decreased pain in R knee and R foot  She also states numbness has decreased post PT session today  Objective: See treatment diary below      Assessment: Held towel crunches, other TE as marked and manual therapy today to assess pain levels NV  Pt reports anterior and posterior R knee pain with step ups today  Pt demonstrates difficulty with doming and when practicing reports pain in R ankle  Patient demonstrated fatigue post treatment, exhibited good technique with therapeutic exercises and would benefit from continued PT  Plan: Continue per plan of care        Precautions: none    Daily Treatment Diary    Date 3/15 3/19 3/22 3/25 3/29 4/1 4/5 4/8     FOTO IE     XX       Re-Eval IE               Manuals    2nd and 3rd oscillation     [facility] PK PK  held                                            Neuro Re-Ed     Doming  HEP 5" x 10 5" x 10 5" x 10 5" x 10 5" x 10 5" x 10 5" x 10     Toe intrinsics  HEP 5" x 10 held          Marbles   x1 hold          Towel crunches   1' hold 30"x3 30"x3 30"x3 2 x 15 hold     Plantar fascia stretch   20" x 5 20" x 5          Resisted great toe flex    GTB 2x10 GTB 2x10 GTB 2x10 GTB 2x10 hold                  Ther Ex    Bike   5' 5'  5' 5' 5' 5'     gastroc and soleus stretch standing   20" x 5 20" x 5 20" x 5  20" x 5 20" x 5 20" x 5 20" x 5     Repeated knee extension sitting with op  HEP 1" x 15 1" x 15     1" x 15     Hamstring stretch - seated  20" x 5 20" x 5     20" x 5     Step ups   1R x10 ea B 1R 2 x10 ea B   1R 2 x10 ea B 2R 2 x10 ea B 2R  x18 ea B     HR/TR standing   15x ea 20x ea   20x ea  15x     Side steps   3 laps  *rtb around calves held          Golf ball roll out    20"x4 20"x4 30" x 4 @home @ home     Ther Activity                                                        Modalities

## 2021-04-13 ENCOUNTER — OFFICE VISIT (OUTPATIENT)
Dept: PHYSICAL THERAPY | Facility: CLINIC | Age: 71
End: 2021-04-13
Payer: MEDICARE

## 2021-04-13 DIAGNOSIS — G57.61 MORTON'S NEUROMA OF RIGHT FOOT: Primary | ICD-10-CM

## 2021-04-13 DIAGNOSIS — S89.91XA INJURY OF RIGHT KNEE, INITIAL ENCOUNTER: ICD-10-CM

## 2021-04-13 PROCEDURE — 97140 MANUAL THERAPY 1/> REGIONS: CPT | Performed by: PHYSICAL THERAPIST

## 2021-04-13 PROCEDURE — 97110 THERAPEUTIC EXERCISES: CPT | Performed by: PHYSICAL THERAPIST

## 2021-04-13 NOTE — PROGRESS NOTES
Daily Note     Today's date: 2021  Patient name: Ephraim Rose  : 1950  MRN: [id number]  Referring provider: Ernst Escalante MD  Dx:   Encounter Diagnosis     ICD-10-CM    1  Wynne's neuroma of right foot  G57 61    2  Injury of right knee, initial encounter  S89  91XA                   Subjective: Pt reports she fluctuates with her current knee and foot pain  She states that there a few interventions that help the pain for a few days, but do not completely abolish the pain  Objective: See treatment diary below      Assessment: Tolerated treatment well  Patient demonstrated fatigue post treatment and exhibited good technique with therapeutic exercises  She was educated on importance of continuing interventions such as stretches to improve ROM and tolerance to ambulation  Plan: Continue per plan of care        Precautions: none    Daily Treatment Diary    Date 3/15 3/19 3/22 3/25 3/29 4/1 4/5 4/8 4/13    FOTO IE     XX       Re-Eval IE               Manuals    2nd and 3rd oscillation     [facility] PK PK  held [facility]    Knee flexion mob         [facility]                               Neuro Re-Ed     Doming  HEP 5" x 10 5" x 10 5" x 10 5" x 10 5" x 10 5" x 10 5" x 10     Toe intrinsics  HEP 5" x 10 held          Marbles   x1 hold          Towel crunches   1' hold 30"x3 30"x3 30"x3 2 x 15 hold     Plantar fascia stretch   20" x 5 20" x 5          Resisted great toe flex    GTB 2x10 GTB 2x10 GTB 2x10 GTB 2x10 hold                  Ther Ex    Bike   5' 5'  5' 5' 5' 5' TM 5'     gastroc and soleus stretch standing   20" x 5 20" x 5 20" x 5  20" x 5 20" x 5 20" x 5 20" x 5 Slant board 20"x5    Repeated knee extension sitting with op  HEP 1" x 15 1" x 15     1" x 15     Hamstring stretch - seated  20" x 5 20" x 5     20" x 5 20"x5 standing     Step ups   1R x10 ea B 1R 2 x10 ea B   1R 2 x10 ea B 2R 2 x10 ea B 2R  x18 ea B     HR/TR standing   15x ea 20x ea   20x ea  15x     LP          2x10 65#     Golf ball roll out 20"x4 20"x4 30" x 4 @home @ home     Ther Activity                                                        Modalities

## 2021-04-13 NOTE — TELEPHONE ENCOUNTER
LMOM for Sylvie to schedule VV procedure  rec call back from Nilda  She injured her foot and her knee and it PT/OT  She will call back sometime end of May beginning of June when she is done w/her therapy to schedule her varicose vein procedure

## 2021-04-14 ENCOUNTER — OFFICE VISIT (OUTPATIENT)
Dept: FAMILY MEDICINE CLINIC | Facility: CLINIC | Age: 71
End: 2021-04-14
Payer: MEDICARE

## 2021-04-14 VITALS
SYSTOLIC BLOOD PRESSURE: 126 MMHG | WEIGHT: 127.8 LBS | DIASTOLIC BLOOD PRESSURE: 72 MMHG | TEMPERATURE: 97.3 F | HEIGHT: 65 IN | BODY MASS INDEX: 21.29 KG/M2

## 2021-04-14 DIAGNOSIS — H11.32 SUBCONJUNCTIVAL HEMORRHAGE OF LEFT EYE: Primary | ICD-10-CM

## 2021-04-14 PROCEDURE — 99213 OFFICE O/P EST LOW 20 MIN: CPT | Performed by: FAMILY MEDICINE

## 2021-04-14 RX ORDER — KETOTIFEN FUMARATE 0.35 MG/ML
1 SOLUTION/ DROPS OPHTHALMIC 2 TIMES DAILY PRN
Qty: 5 ML | Refills: 0 | Status: SHIPPED | OUTPATIENT
Start: 2021-04-14 | End: 2022-05-18 | Stop reason: SDUPTHER

## 2021-04-15 NOTE — PROGRESS NOTES
Assessment/Plan:     70-year-old woman with:  Subconjunctival hemorrhage of the left eye will give topical anti-inflammatory discussed supportive care return parameters and patient has follow-up appointment tomorrow with an eye doctor advised if worsens she should be seen immediately    No problem-specific Assessment & Plan notes found for this encounter  Diagnoses and all orders for this visit:    Subconjunctival hemorrhage of left eye  -     ketotifen (ZADITOR) 0 025 % ophthalmic solution; Administer 1 drop to both eyes 2 (two) times a day as needed (eye itching or discomfort)          Subjective:     Chief Complaint   Patient presents with    Eye Problem     Patient woke up with left eye red and itchy  Patient ID: Marilin George is a 73 y o  female  Patient is a 70-year-old woman who presents complaining of vomiting redness in her right eye this morning no fevers chills nausea vomiting no drainage no pain no blurry vision or double vision no sensitivity to light      The following portions of the patient's history were reviewed and updated as appropriate: allergies, current medications, past family history, past medical history, past social history, past surgical history and problem list     Review of Systems   Constitutional: Negative  HENT: Negative  Eyes: Positive for redness  Respiratory: Negative  Cardiovascular: Negative  Gastrointestinal: Negative  Endocrine: Negative  Genitourinary: Negative  Musculoskeletal: Negative  Allergic/Immunologic: Negative  Neurological: Negative  Hematological: Negative  Psychiatric/Behavioral: Negative  All other systems reviewed and are negative          Objective:      /72 (BP Location: Right arm, Patient Position: Sitting, Cuff Size: Adult)   Temp (!) 97 3 °F (36 3 °C) (Tympanic)   Ht 5' 5" (1 651 m)   Wt 58 kg (127 lb 12 8 oz)   LMP  (LMP Unknown)   BMI 21 27 kg/m²          Physical Exam  Constitutional: Appearance: She is well-developed  HENT:      Head: Atraumatic  Right Ear: External ear normal       Left Ear: External ear normal    Eyes:      Conjunctiva/sclera: Conjunctivae normal       Pupils: Pupils are equal, round, and reactive to light  Comments: Left-sided subconjunctival hemorrhage   Neck:      Musculoskeletal: Normal range of motion  Pulmonary:      Effort: Pulmonary effort is normal  No respiratory distress  Abdominal:      General: There is no distension  Musculoskeletal: Normal range of motion  Skin:     General: Skin is warm and dry  Neurological:      Mental Status: She is alert and oriented to person, place, and time  Cranial Nerves: No cranial nerve deficit  Psychiatric:         Behavior: Behavior normal          Thought Content:  Thought content normal          Judgment: Judgment normal

## 2021-04-19 ENCOUNTER — OFFICE VISIT (OUTPATIENT)
Dept: PHYSICAL THERAPY | Facility: CLINIC | Age: 71
End: 2021-04-19
Payer: MEDICARE

## 2021-04-19 DIAGNOSIS — G57.61 MORTON'S NEUROMA OF RIGHT FOOT: Primary | ICD-10-CM

## 2021-04-19 DIAGNOSIS — S89.91XA INJURY OF RIGHT KNEE, INITIAL ENCOUNTER: ICD-10-CM

## 2021-04-19 PROCEDURE — 97110 THERAPEUTIC EXERCISES: CPT | Performed by: PHYSICAL THERAPIST

## 2021-04-19 PROCEDURE — 97112 NEUROMUSCULAR REEDUCATION: CPT | Performed by: PHYSICAL THERAPIST

## 2021-04-19 NOTE — PROGRESS NOTES
Daily Note     Today's date: 2021  Patient name: Alli Castro  : 1950  MRN: [id number]  Referring provider: John Leiva MD  Dx:   Encounter Diagnosis     ICD-10-CM    1  Wynne's neuroma of right foot  G57 61    2  Injury of right knee, initial encounter  S89  91XA                   Subjective: Pt is currently stating that she had a long weekend 2/2 the covid vaccine  She states that her knee is still bothering her, but is only having the foot pain when she is walking or completing activities for long periods of time  Objective: See treatment diary below      Assessment: Tolerated treatment well  Patient did have increased pain with repeated knee Flexon and extension interventions  Especially bridges today  She was educated on       Plan: Continue per plan of care        Precautions: none    Daily Treatment Diary    Date 3/15 3/19 3/22 3/25 3/29 4/1 4/5 4/8 4/13 4/19   FOTO IE     XX    NV   Re-Eval IE         NV      Manuals    2nd and 3rd oscillation      [address]  [address]    Knee flexion mob          [address]                               Neuro Re-Ed     Doming  HEP 5" x 10 5" x 10 5" x 10 5" x 10 5" x 10 5" x 10 5" x 10     Toe intrinsics  HEP 5" x 10 held          Marbles   x1 hold          Towel crunches   1' hold 30"x3 30"x3 30"x3 2 x 15 hold     Plantar fascia stretch   20" x 5 20" x 5          Resisted great toe flex    GTB 2x10 GTB 2x10 GTB 2x10 GTB 2x10 hold     4 way TB          BTB 2x10    Ther Ex    Bike   5' 5'  5' 5' 5' 5' TM 5'  elyp 5'    gastroc and soleus stretch standing   20" x 5 20" x 5 20" x 5  20" x 5 20" x 5 20" x 5 20" x 5 Slant board 20"x5 20"x5   Repeated knee extension sitting with op  HEP 1" x 15 1" x 15     1" x 15     Hamstring stretch - seated  20" x 5 20" x 5     20" x 5 20"x5 standing  20"x5   Step ups   1R x10 ea B 1R 2 x10 ea B   1R 2 x10 ea B 2R 2 x10 ea B 2R  x18 ea B     HR/TR standing   15x ea 20x ea   20x ea  15x     LP          2x10 65#  2x10 75# HR LP 10x Golf ball roll out    20"x4 20"x4 30" x 4 @home @ home     Ther Activity                                                        Modalities

## 2021-04-22 ENCOUNTER — OFFICE VISIT (OUTPATIENT)
Dept: PHYSICAL THERAPY | Facility: CLINIC | Age: 71
End: 2021-04-22
Payer: MEDICARE

## 2021-04-22 DIAGNOSIS — G57.61 MORTON'S NEUROMA OF RIGHT FOOT: Primary | ICD-10-CM

## 2021-04-22 DIAGNOSIS — S89.91XA INJURY OF RIGHT KNEE, INITIAL ENCOUNTER: ICD-10-CM

## 2021-04-22 PROCEDURE — 97110 THERAPEUTIC EXERCISES: CPT

## 2021-04-22 PROCEDURE — 97140 MANUAL THERAPY 1/> REGIONS: CPT

## 2021-04-22 NOTE — PROGRESS NOTES
Daily Note     Today's date: 2021  Patient name: Pinky Polanco  : 1950  MRN: [id number]  Referring provider: Valery Broderick MD  Dx:   Encounter Diagnosis     ICD-10-CM    1  Wynne's neuroma of right foot  G57 61    2  Injury of right knee, initial encounter  S89  91XA                   Subjective: Pt presents to PT reporting pain in R foot, lateral radiating into dorsum of foot after last session  She states she had a full day of pain and does not want to perform the band exercises  Pt denies pain post PT session  Objective: See treatment diary below      Assessment: Tolerated treatment well  Added towel behind right knee for PROM with + response with no pain in posterior knee  Measured R knee flexion in supine at ~135 degrees with no complaints of pain but reports mild stretch in R anterior knee  Patient demonstrated fatigue post treatment, exhibited good technique with therapeutic exercises and would benefit from continued PT to increase flexibility, strength and function  Plan: Continue per plan of care        Precautions: none    Daily Treatment Diary    Date    FOTO      XX    NV   Re-Eval          NV      Manuals    2nd and 3rd oscillation  PK     PK  held [address]    Knee flexion mob         [address]     PROM R knee flexion c rolled towel PK                         Neuro Re-Ed     Doming  hold     5" x 10 5" x 10 5" x 10     Toe intrinsics              Marbles  hold            Towel crunches  hold     30"x3 2 x 15 hold     Plantar fascia stretch              Resisted great toe flex hold     GTB 2x10 GTB 2x10 hold     4 way TB hold         BTB 2x10    Ther Ex    Bike  elyp 5'      5' 5' 5' TM 5'  elyp 5'    gastroc and soleus stretch standing  20"x5     20" x 5 20" x 5 20" x 5 Slant board 20"x5 20"x5   Repeated knee extension sitting with op  1" x 15       1" x 15     Hamstring stretch - seated 20"x5       20" x 5 20"x5 standing  20"x5   Step ups  2R  x20 ea B 1R 2 x10 ea B 2R 2 x10 ea B 2R  x18 ea B     HR/TR standing       20x ea  15x     LP  2x10 85# LP, HR 75# 10x 2        2x10 65#  2x10 75# HR LP 10x   Golf ball roll out      30" x 4 @home @ home     Ther Activity                                                        Modalities

## 2021-04-26 ENCOUNTER — OFFICE VISIT (OUTPATIENT)
Dept: PHYSICAL THERAPY | Facility: CLINIC | Age: 71
End: 2021-04-26
Payer: MEDICARE

## 2021-04-26 DIAGNOSIS — G57.61 MORTON'S NEUROMA OF RIGHT FOOT: Primary | ICD-10-CM

## 2021-04-26 DIAGNOSIS — S89.91XA INJURY OF RIGHT KNEE, INITIAL ENCOUNTER: ICD-10-CM

## 2021-04-26 PROCEDURE — 97110 THERAPEUTIC EXERCISES: CPT

## 2021-04-26 NOTE — PROGRESS NOTES
Daily Note     Today's date: 2021  Patient name: Eugene Elliott  : 1950  MRN: [id number]  Referring provider: Mary Camara MD  Dx:   Encounter Diagnosis     ICD-10-CM    1  Wynne's neuroma of right foot  G57 61    2  Injury of right knee, initial encounter  S89  91XA                   Subjective: Pt presents to PT reporting occ numbness in plantar surface of R foot between 2nd and 3rd toe  She reports pain increases with activity stating she needs to sit down for pain to dissipate  Pt reports decreased pain and stiffness in R knee post PT session  She reports mild increase in neuroma after HR on LP  Objective: See treatment diary below      Assessment: Tolerated treatment well  Added new TE for R knee strength and flexibility  Did not increase weight with LP secondary to pt reporting the current weight is challenging  Pt reports tightness pointing to location of distal fibular head  Performed PROM to same area c positive response less discomfort and increased AROM  Issued pt an updated HEP for home including knee TE performed today  Patient demonstrated fatigue post treatment, exhibited good technique with therapeutic exercises and would benefit from continued PT to increase flexibility, strength and function  Plan: Continue per plan of care        Precautions: none    Daily Treatment Diary    Date    FOTO          NV   Re-Eval          NV      Manuals    2nd and 3rd oscillation  PK PK      held [facility]    Knee flexion mob         [facility]     PROM R knee flexion c rolled pillowcase PK PK, no pc, R distal fibular head PROM                        Neuro Re-Ed     Doming  hold held      5" x 10     Toe intrinsics   held           Marbles  hold held           Towel crunches  hold held      hold     Plantar fascia stretch              Resisted great toe flex hold held      hold     4 way TB hold held        BTB 2x10    Ther Ex    Bike  elyp 5'  5' bike      5' TM 5'  elyp 5' gastroc and soleus stretch standing  20"x5 20"x5 ea      20" x 5 Slant board 20"x5 20"x5   Repeated knee extension sitting with op  1" x 15 1" x 15      1" x 15     Hamstring stretch - seated 20"x5 20" x5      20" x 5 20"x5 standing  20"x5   Squats  @ HR  x17 pn           LAQ  0# x 15           SLR  2 x 10           Heel taps  np                        Step ups  2R  x20 ea B 2R  x20 ea B      2R  x18 ea B     HR/TR standing         15x     LP  2x10 85# LP, HR 75# 10x 2 2x10 85# LP, HR 75# 10x 2       2x10 65#  2x10 75# HR LP 10x   Golf ball roll out        @ home     Ther Activity                                                        Modalities

## 2021-04-29 ENCOUNTER — OFFICE VISIT (OUTPATIENT)
Dept: PHYSICAL THERAPY | Facility: CLINIC | Age: 71
End: 2021-04-29
Payer: MEDICARE

## 2021-04-29 DIAGNOSIS — G57.61 MORTON'S NEUROMA OF RIGHT FOOT: Primary | ICD-10-CM

## 2021-04-29 DIAGNOSIS — S89.91XA INJURY OF RIGHT KNEE, INITIAL ENCOUNTER: ICD-10-CM

## 2021-04-29 PROCEDURE — 97140 MANUAL THERAPY 1/> REGIONS: CPT | Performed by: PHYSICAL THERAPIST

## 2021-04-29 PROCEDURE — 97110 THERAPEUTIC EXERCISES: CPT | Performed by: PHYSICAL THERAPIST

## 2021-04-29 NOTE — PROGRESS NOTES
Daily Note     Today's date: 2021  Patient name: Delmer Harvey  : 1950  MRN: [id number]  Referring provider: Carlos Yusuf MD  Dx:   Encounter Diagnosis     ICD-10-CM    1  Wynne's neuroma of right foot  G57 61    2  Injury of right knee, initial encounter  S89  91XA                   Subjective: Pt is currently stating that she has started to avoid ambulating on her toes 2/2 increased pain in her neuroma  She states that her knee       Objective: See treatment diary below      Assessment: Tolerated treatment well  Patient during the session was taken through special tests to with increased pain during LCL testing at 0 degrees  Due to this she was educated on hip interventions to improver her ambulation and walking tolerance with less knee valgus  Plan: Continue per plan of care        Precautions: none    Daily Treatment Diary    Date    FOTO          NV   Re-Eval          NV      Manuals    2nd and 3rd oscillation  [address]     held  [address]    Knee flexion mob   [address]       [address]     PROM R knee flexion c rolled pillowcase PK PK, no pc, R distal fibular head PROM                        Neuro Re-Ed     Doming  hold held      5" x 10     Toe intrinsics   held           Marbles  hold held           Towel crunches  hold held      hold     Plantar fascia stretch              Resisted great toe flex hold held      hold     4 way TB hold held        BTB 2x10    Ther Ex    Bike  elyp 5'  5' bike TM 4 pn!     5' TM 5'  elyp 5'    gastroc and soleus stretch standing  20"x5 20"x5 ea      20" x 5 Slant board 20"x5 20"x5   Repeated knee extension sitting with op  1" x 15 1" x 15      1" x 15     Hamstring stretch - seated 20"x5 20" x5      20" x 5 20"x5 standing  20"x5   Squats  @ HR  x17 pn           LAQ  0# x 15           SLR  2 x 10 abd 2x10 B          Heel taps  np           bridge   2x10           Step ups  2R  x20 ea B 2R  x20 ea B      2R  x18 ea B     Standing hip abd/ext   2x10 B          LP  2x10 85# LP, HR 75# 10x 2 2x10 85# LP, HR 75# 10x 2       2x10 65#  2x10 75# HR LP 10x   Golf ball roll out        @ home     Ther Activity                                                        Modalities

## 2021-05-10 ENCOUNTER — APPOINTMENT (OUTPATIENT)
Dept: LAB | Facility: MEDICAL CENTER | Age: 71
End: 2021-05-10
Payer: MEDICARE

## 2021-05-10 DIAGNOSIS — F33.41 RECURRENT MAJOR DEPRESSIVE DISORDER, IN PARTIAL REMISSION (HCC): ICD-10-CM

## 2021-05-10 DIAGNOSIS — R10.813 RIGHT LOWER QUADRANT ABDOMINAL TENDERNESS WITHOUT REBOUND TENDERNESS: ICD-10-CM

## 2021-05-10 DIAGNOSIS — G57.61 MORTON'S NEUROMA OF RIGHT FOOT: ICD-10-CM

## 2021-05-10 DIAGNOSIS — M79.671 RIGHT FOOT PAIN: ICD-10-CM

## 2021-05-10 DIAGNOSIS — E46 PROTEIN-CALORIE MALNUTRITION, UNSPECIFIED SEVERITY (HCC): ICD-10-CM

## 2021-05-10 LAB
ALBUMIN SERPL BCP-MCNC: 3.7 G/DL (ref 3.5–5)
ALP SERPL-CCNC: 74 U/L (ref 46–116)
ALT SERPL W P-5'-P-CCNC: 24 U/L (ref 12–78)
ANION GAP SERPL CALCULATED.3IONS-SCNC: 2 MMOL/L (ref 4–13)
AST SERPL W P-5'-P-CCNC: 19 U/L (ref 5–45)
BASOPHILS # BLD AUTO: 0.1 THOUSANDS/ΜL (ref 0–0.1)
BASOPHILS NFR BLD AUTO: 2 % (ref 0–1)
BILIRUB SERPL-MCNC: 0.49 MG/DL (ref 0.2–1)
BUN SERPL-MCNC: 12 MG/DL (ref 5–25)
CALCIUM SERPL-MCNC: 8.6 MG/DL (ref 8.3–10.1)
CHLORIDE SERPL-SCNC: 106 MMOL/L (ref 100–108)
CO2 SERPL-SCNC: 29 MMOL/L (ref 21–32)
CREAT SERPL-MCNC: 0.69 MG/DL (ref 0.6–1.3)
CRP SERPL QL: <3 MG/L
EOSINOPHIL # BLD AUTO: 0.19 THOUSAND/ΜL (ref 0–0.61)
EOSINOPHIL NFR BLD AUTO: 3 % (ref 0–6)
ERYTHROCYTE [DISTWIDTH] IN BLOOD BY AUTOMATED COUNT: 13.5 % (ref 11.6–15.1)
GFR SERPL CREATININE-BSD FRML MDRD: 89 ML/MIN/1.73SQ M
GLUCOSE P FAST SERPL-MCNC: 89 MG/DL (ref 65–99)
HCT VFR BLD AUTO: 44.7 % (ref 34.8–46.1)
HGB BLD-MCNC: 14.4 G/DL (ref 11.5–15.4)
IMM GRANULOCYTES # BLD AUTO: 0.01 THOUSAND/UL (ref 0–0.2)
IMM GRANULOCYTES NFR BLD AUTO: 0 % (ref 0–2)
LYMPHOCYTES # BLD AUTO: 1.9 THOUSANDS/ΜL (ref 0.6–4.47)
LYMPHOCYTES NFR BLD AUTO: 30 % (ref 14–44)
MCH RBC QN AUTO: 29.4 PG (ref 26.8–34.3)
MCHC RBC AUTO-ENTMCNC: 32.2 G/DL (ref 31.4–37.4)
MCV RBC AUTO: 91 FL (ref 82–98)
MONOCYTES # BLD AUTO: 0.6 THOUSAND/ΜL (ref 0.17–1.22)
MONOCYTES NFR BLD AUTO: 10 % (ref 4–12)
NEUTROPHILS # BLD AUTO: 3.47 THOUSANDS/ΜL (ref 1.85–7.62)
NEUTS SEG NFR BLD AUTO: 55 % (ref 43–75)
NRBC BLD AUTO-RTO: 0 /100 WBCS
PLATELET # BLD AUTO: 222 THOUSANDS/UL (ref 149–390)
PMV BLD AUTO: 11.1 FL (ref 8.9–12.7)
POTASSIUM SERPL-SCNC: 4.4 MMOL/L (ref 3.5–5.3)
PROT SERPL-MCNC: 6.8 G/DL (ref 6.4–8.2)
RBC # BLD AUTO: 4.9 MILLION/UL (ref 3.81–5.12)
SODIUM SERPL-SCNC: 137 MMOL/L (ref 136–145)
TSH SERPL DL<=0.05 MIU/L-ACNC: 0.67 UIU/ML (ref 0.36–3.74)
WBC # BLD AUTO: 6.27 THOUSAND/UL (ref 4.31–10.16)

## 2021-05-10 PROCEDURE — 36415 COLL VENOUS BLD VENIPUNCTURE: CPT

## 2021-05-10 PROCEDURE — 85025 COMPLETE CBC W/AUTO DIFF WBC: CPT

## 2021-05-10 PROCEDURE — 86140 C-REACTIVE PROTEIN: CPT

## 2021-05-10 PROCEDURE — 84443 ASSAY THYROID STIM HORMONE: CPT

## 2021-05-10 PROCEDURE — 80053 COMPREHEN METABOLIC PANEL: CPT

## 2021-05-10 PROCEDURE — 86618 LYME DISEASE ANTIBODY: CPT

## 2021-05-11 LAB — B BURGDOR IGG+IGM SER-ACNC: 3

## 2021-05-19 ENCOUNTER — OFFICE VISIT (OUTPATIENT)
Dept: FAMILY MEDICINE CLINIC | Facility: CLINIC | Age: 71
End: 2021-05-19
Payer: MEDICARE

## 2021-05-19 VITALS
HEIGHT: 65 IN | DIASTOLIC BLOOD PRESSURE: 80 MMHG | WEIGHT: 122.2 LBS | TEMPERATURE: 97.3 F | BODY MASS INDEX: 20.36 KG/M2 | SYSTOLIC BLOOD PRESSURE: 118 MMHG

## 2021-05-19 DIAGNOSIS — Z12.31 ENCOUNTER FOR SCREENING MAMMOGRAM FOR MALIGNANT NEOPLASM OF BREAST: Primary | ICD-10-CM

## 2021-05-19 DIAGNOSIS — Z00.00 MEDICARE ANNUAL WELLNESS VISIT, SUBSEQUENT: ICD-10-CM

## 2021-05-19 DIAGNOSIS — G57.61 MORTON'S NEUROMA OF RIGHT FOOT: ICD-10-CM

## 2021-05-19 DIAGNOSIS — Z13.820 OSTEOPOROSIS SCREENING: ICD-10-CM

## 2021-05-19 DIAGNOSIS — J45.909 UNCOMPLICATED ASTHMA, UNSPECIFIED ASTHMA SEVERITY, UNSPECIFIED WHETHER PERSISTENT: ICD-10-CM

## 2021-05-19 DIAGNOSIS — J30.1 ALLERGIC RHINITIS DUE TO POLLEN, UNSPECIFIED SEASONALITY: ICD-10-CM

## 2021-05-19 DIAGNOSIS — E28.310 SYMPTOMATIC PREMATURE MENOPAUSE: ICD-10-CM

## 2021-05-19 PROCEDURE — 99213 OFFICE O/P EST LOW 20 MIN: CPT | Performed by: FAMILY MEDICINE

## 2021-05-19 PROCEDURE — 1123F ACP DISCUSS/DSCN MKR DOCD: CPT | Performed by: FAMILY MEDICINE

## 2021-05-19 PROCEDURE — G0439 PPPS, SUBSEQ VISIT: HCPCS | Performed by: FAMILY MEDICINE

## 2021-05-19 RX ORDER — AZELASTINE 1 MG/ML
1 SPRAY, METERED NASAL 2 TIMES DAILY
Qty: 1 ML | Refills: 2 | Status: SHIPPED | OUTPATIENT
Start: 2021-05-19 | End: 2021-06-09

## 2021-05-19 RX ORDER — ALBUTEROL SULFATE 90 UG/1
1 AEROSOL, METERED RESPIRATORY (INHALATION) AS NEEDED
Qty: 8.5 G | Refills: 2 | Status: SHIPPED | OUTPATIENT
Start: 2021-05-19 | End: 2022-05-18 | Stop reason: SDUPTHER

## 2021-05-19 NOTE — PROGRESS NOTES
Assessment and Plan:     Problem List Items Addressed This Visit        Respiratory    Asthma    Relevant Medications    albuterol (PROVENTIL HFA,VENTOLIN HFA) 90 mcg/act inhaler       Nervous and Auditory    Wynne's neuroma of right foot    Relevant Orders    Ambulatory referral to Podiatry       Other    Medicare annual wellness visit, subsequent      Other Visit Diagnoses     Encounter for screening mammogram for malignant neoplasm of breast    -  Primary    Relevant Orders    Mammo screening bilateral w 3d & cad    Osteoporosis screening        Relevant Orders    DXA bone density spine hip and pelvis    Symptomatic premature menopause         Relevant Orders    DXA bone density spine hip and pelvis    Allergic rhinitis due to pollen, unspecified seasonality        Relevant Medications    azelastine (ASTELIN) 0 1 % nasal spray           Preventive health issues were discussed with patient, and age appropriate screening tests were ordered as noted in patient's After Visit Summary  Personalized health advice and appropriate referrals for health education or preventive services given if needed, as noted in patient's After Visit Summary       History of Present Illness:     Patient presents for Medicare Annual Wellness visit    Patient Care Team:  Pteer Smith MD as PCP - General  DO Saroj Bellamy PA-C (Vascular Surgery)  Ny Vail MD (Vascular Surgery)     Problem List:     Patient Active Problem List   Diagnosis    Recurrent major depressive disorder, in partial remission ([facility] [address] )    Hyperlipidemia    Pleural thickening    Symptomatic varicose veins of both lower extremities    Vitamin D deficiency    Changing skin lesion    Asthma    Other acute recurrent sinusitis    Dermatitis    Superficial phlebitis of leg, right    Medicare annual wellness visit, subsequent    Left knee injury    Left shoulder pain    Acute upper respiratory infection    Cerumen impaction    Depression    Right foot pain    RLQ abdominal tenderness    Bug bite    Protein-calorie malnutrition (HCC)    Wynne's neuroma of right foot    Chronic pain of right ankle    Right knee injury      Past Medical and Surgical History:     Past Medical History:   Diagnosis Date    Allergic     penicillin,ragweed,cat,dog,mold,grass,moderate soy    Allergic rhinitis     Breast cancer ([address] ) 2003    left breast phyllodes     Past Surgical History:   Procedure Laterality Date    BREAST BIOPSY Right 2005    benign    BREAST LUMPECTOMY Left     phyllodes    TONSILLECTOMY      WISDOM TOOTH EXTRACTION        Family History:     Family History   Problem Relation Age of Onset    Uterine cancer Mother     Varicose Veins Mother     Hypertension Mother     Dysfunctional uterine bleeding Mother     Endometrial cancer Mother 80    COPD Father     Heart disease Father         Cardiac disorder    Heart attack Brother     Diabetes Family     COPD Sister     No Known Problems Maternal Grandmother     No Known Problems Maternal Grandfather     No Known Problems Paternal Grandmother     No Known Problems Paternal Grandfather       Social History:     E-Cigarette/Vaping    E-Cigarette Use Never User      E-Cigarette/Vaping Substances    Nicotine No     THC No     CBD No     Flavoring No     Other No     Unknown No      Social History     Socioeconomic History    Marital status: Single     Spouse name: None    Number of children: None    Years of education: None    Highest education level: Some college, no degree   Occupational History    Occupation: retired   Social Needs    Financial resource strain: Not hard at all   [place] insecurity     Worry: Never true     Inability: Never true    Transportation needs     Medical: No     Non-medical: No   Tobacco Use    Smoking status: Former Smoker     Quit date:      Years since quittin 3    Smokeless tobacco: Never Used   Substance and Sexual Activity    Alcohol use: Yes     Alcohol/week: 1 0 standard drinks     Types: 1 Glasses of wine per week     Frequency: 2-4 times a month     Drinks per session: 1 or 2     Comment: occasional    Drug use: No    Sexual activity: Not Currently   Lifestyle    Physical activity     Days per week: 7 days     Minutes per session: 30 min    Stress: Only a little   Relationships    Social connections     Talks on phone: More than three times a week     Gets together:  Three times a week     Attends Synagogue service: More than 4 times per year     Active member of club or organization: No     Attends meetings of clubs or organizations: Never     Relationship status:     Intimate partner violence     Fear of current or ex partner: No     Emotionally abused: No     Physically abused: No     Forced sexual activity: No   Other Topics Concern    None   Social History Narrative    Always uses seatbelt        Caffeine use consumes 2 cups of coffee per day          Medications and Allergies:     Current Outpatient Medications   Medication Sig Dispense Refill    acetaminophen (TYLENOL) 325 mg tablet Take 650 mg by mouth every 6 (six) hours as needed for mild pain      albuterol (PROVENTIL HFA,VENTOLIN HFA) 90 mcg/act inhaler Inhale 1 puff as needed for wheezing 8 5 g 2    azelastine (ASTELIN) 0 1 % nasal spray 1 spray into each nostril 2 (two) times a day Use in each nostril as directed 1 mL 2    EPINEPHrine (EPIPEN) 0 3 mg/0 3 mL SOAJ Inject 0 3 mL (0 3 mg total) into a muscle once for 1 dose 0 6 mL 0    ketotifen (ZADITOR) 0 025 % ophthalmic solution Administer 1 drop to both eyes 2 (two) times a day as needed (eye itching or discomfort) 5 mL 0    methocarbamol (ROBAXIN) 500 mg tablet Take 1 tablet (500 mg total) by mouth daily at bedtime as needed for muscle spasms (Patient not taking: Reported on 3/22/2021) 30 tablet 0     Current Facility-Administered Medications   Medication Dose Route Frequency Provider Last Rate Last Admin    cyanocobalamin injection 1,000 mcg  1,000 mcg Intramuscular Q30 Days Manuela Orellana MD   1,000 mcg at 02/17/21 1014    cyanocobalamin injection 1,000 mcg  1,000 mcg Intramuscular Q30 Days Manuela Orellana MD   1,000 mcg at 11/20/20 1025     Allergies   Allergen Reactions    Ambrosia Artemisiifolia (Ragweed) Skin Test     Bee Venom Hives    Cat Hair Extract     Codeine      Pt stated not allergic      Dog Epithelium Allergy Skin Test     Mold Extract  [Trichophyton]     Penicillins     Soy Isoflavones     Wasp Venom Protein       Immunizations:     Immunization History   Administered Date(s) Administered    INFLUENZA 11/20/2012, 11/15/2013, 10/20/2014, 10/03/2017    Influenza, high dose seasonal 0 7 mL 10/16/2019, 11/18/2020    Influenza, seasonal, injectable 11/02/2020    SARS-CoV-2 / COVID-19 mRNA IM ([employer] [facility]) 03/17/2021, 04/14/2021    Tdap 11/11/2009, 08/22/2016      Health Maintenance:         Topic Date Due    MAMMOGRAM  04/15/2021    Colorectal Cancer Screening  06/14/2023    Hepatitis C Screening  Completed         Topic Date Due    Pneumococcal Vaccine: 65+ Years (1 of 1 - PPSV23) Never done      Medicare Health Risk Assessment:     /80 (BP Location: Left arm, Patient Position: Sitting, Cuff Size: Standard)   Temp (!) 97 3 °F (36 3 °C) (Tympanic)   Ht 5' 5" (1 651 m)   Wt 55 4 kg (122 lb 3 2 oz)   LMP  (LMP Unknown)   BMI 20 34 kg/m²      Mana Dumont is here for her Subsequent Wellness visit  Last Medicare Wellness visit information reviewed, patient interviewed, no change since last AWV  Health Risk Assessment:   Patient rates overall health as good  Patient feels that their physical health rating is same  Patient is very satisfied with their life  Eyesight was rated as same  Hearing was rated as same  Patient feels that their emotional and mental health rating is same  Patients states they are never, rarely angry   Patient states they are never, rarely unusually tired/fatigued  Pain experienced in the last 7 days has been some  Patient's pain rating has been 6/10  Patient states that she has experienced no weight loss or gain in last 6 months  Right Knee And Rig Hip    Depression Screening:   PHQ-2 Score: 0  PHQ-9 Score: 0      Fall Risk Screening: In the past year, patient has experienced: no history of falling in past year      Urinary Incontinence Screening:   Patient has not leaked urine accidently in the last six months  Home Safety:  Patient does not have trouble with stairs inside or outside of their home  Patient has working smoke alarms and has working carbon monoxide detector  Home safety hazards include: none  Nutrition:   Current diet is Limited junk food  Medications:   Patient is currently taking over-the-counter supplements  OTC medications include: see medication list  Patient is able to manage medications  Activities of Daily Living (ADLs)/Instrumental Activities of Daily Living (IADLs):   Walk and transfer into and out of bed and chair?: Yes  Dress and groom yourself?: Yes    Bathe or shower yourself?: Yes    Feed yourself?  Yes  Do your laundry/housekeeping?: Yes  Manage your money, pay your bills and track your expenses?: Yes  Make your own meals?: Yes    Do your own shopping?: Yes    Previous Hospitalizations:   Any hospitalizations or ED visits within the last 12 months?: No      Advance Care Planning:   Living will: No    Durable POA for healthcare: No    Advanced directive: No      Cognitive Screening:   Provider or family/friend/caregiver concerned regarding cognition?: No    PREVENTIVE SCREENINGS      Cardiovascular Screening:    General: Screening Not Indicated and History Lipid Disorder      Diabetes Screening:     General: Screening Current    Due for: Blood Glucose      Colorectal Cancer Screening:     General: Screening Current      Breast Cancer Screening:     General: Risks and Benefits Discussed Due for: Mammogram        Cervical Cancer Screening:    General: Screening Not Indicated      Osteoporosis Screening:    General: Screening Not Indicated      Abdominal Aortic Aneurysm (AAA) Screening:        General: Screening Not Indicated      Lung Cancer Screening:     General: Screening Not Indicated      Hepatitis C Screening:    General: Screening Current      Aravind Davidson MD

## 2021-05-20 NOTE — PROGRESS NOTES
Assessment/Plan:     56-year-old woman with: , Wynne's neuroma, asthma allergic rhinitis discussed supportive care return parameters will continue current medications advised to call back if not improving worsening otherwise follow-up in 6 months    No problem-specific Assessment & Plan notes found for this encounter  Diagnoses and all orders for this visit:    Encounter for screening mammogram for malignant neoplasm of breast  -     Mammo screening bilateral w 3d & cad; Future    Osteoporosis screening  -     DXA bone density spine hip and pelvis; Future    Symptomatic premature menopause   -     DXA bone density spine hip and pelvis; Future    Uncomplicated asthma, unspecified asthma severity, unspecified whether persistent  -     albuterol (PROVENTIL HFA,VENTOLIN HFA) 90 mcg/act inhaler; Inhale 1 puff as needed for wheezing    Allergic rhinitis due to pollen, unspecified seasonality  -     azelastine (ASTELIN) 0 1 % nasal spray; 1 spray into each nostril 2 (two) times a day Use in each nostril as directed    Wynne's neuroma of right foot  -     Ambulatory referral to Podiatry; Future    Medicare annual wellness visit, subsequent          Subjective:     Chief Complaint   Patient presents with    Medicare Wellness Visit     73year old female        Patient ID: Kofi Sears is a 73 y o  female  Patient is a 56-year-old woman who presents complaining of a painful area on her foot she would like a referral for 2nd opinion no fevers chills nausea vomiting tolerating p o  intake she also has asthma and allergies she would like refills of her medications      The following portions of the patient's history were reviewed and updated as appropriate: allergies, current medications, past family history, past medical history, past social history, past surgical history and problem list     Review of Systems   Constitutional: Negative  HENT: Negative  Eyes: Negative  Respiratory: Negative  Cardiovascular: Negative  Gastrointestinal: Negative  Endocrine: Negative  Genitourinary: Negative  Musculoskeletal: Negative  Allergic/Immunologic: Negative  Neurological: Negative  Hematological: Negative  Psychiatric/Behavioral: Negative  All other systems reviewed and are negative  Objective:      /80 (BP Location: Left arm, Patient Position: Sitting, Cuff Size: Standard)   Temp (!) 97 3 °F (36 3 °C) (Tympanic)   Ht 5' 5" (1 651 m)   Wt 55 4 kg (122 lb 3 2 oz)   LMP  (LMP Unknown)   BMI 20 34 kg/m²          Physical Exam  Constitutional:       Appearance: She is well-developed  HENT:      Head: Atraumatic  Right Ear: External ear normal       Left Ear: External ear normal    Eyes:      Conjunctiva/sclera: Conjunctivae normal       Pupils: Pupils are equal, round, and reactive to light  Neck:      Musculoskeletal: Normal range of motion  Cardiovascular:      Rate and Rhythm: Normal rate and regular rhythm  Heart sounds: Normal heart sounds  Pulmonary:      Effort: Pulmonary effort is normal  No respiratory distress  Breath sounds: Normal breath sounds  Abdominal:      General: There is no distension  Palpations: Abdomen is soft  Tenderness: There is no abdominal tenderness  There is no guarding or rebound  Musculoskeletal: Normal range of motion  Skin:     General: Skin is warm and dry  Neurological:      Mental Status: She is alert and oriented to person, place, and time  Cranial Nerves: No cranial nerve deficit  Psychiatric:         Behavior: Behavior normal          Thought Content:  Thought content normal          Judgment: Judgment normal

## 2021-05-21 ENCOUNTER — HOSPITAL ENCOUNTER (OUTPATIENT)
Dept: BONE DENSITY | Facility: MEDICAL CENTER | Age: 71
Discharge: HOME | End: 2021-05-21
Payer: MEDICARE

## 2021-05-21 ENCOUNTER — HOSPITAL ENCOUNTER (OUTPATIENT)
Dept: MAMMOGRAPHY | Facility: MEDICAL CENTER | Age: 71
Discharge: HOME | End: 2021-05-21
Payer: MEDICARE

## 2021-05-21 VITALS — HEIGHT: 65 IN | BODY MASS INDEX: 20.33 KG/M2 | WEIGHT: 122 LBS

## 2021-05-21 DIAGNOSIS — Z12.31 ENCOUNTER FOR SCREENING MAMMOGRAM FOR MALIGNANT NEOPLASM OF BREAST: ICD-10-CM

## 2021-05-21 DIAGNOSIS — E28.310 SYMPTOMATIC PREMATURE MENOPAUSE: ICD-10-CM

## 2021-05-21 DIAGNOSIS — Z13.820 OSTEOPOROSIS SCREENING: ICD-10-CM

## 2021-05-21 PROCEDURE — 77067 SCR MAMMO BI INCL CAD: CPT

## 2021-05-21 PROCEDURE — 77063 BREAST TOMOSYNTHESIS BI: CPT

## 2021-05-21 PROCEDURE — 77080 DXA BONE DENSITY AXIAL: CPT

## 2021-05-26 NOTE — TELEPHONE ENCOUNTER
Spoke with Nilda and she can only obtain transportation for [employer] and Tuesdays  She is requesting to have her EVLT on Tuesday 6/22/21  I advised her we will need to check with the rep first and call her back

## 2021-05-26 NOTE — TELEPHONE ENCOUNTER
LMOM for [facility] about 6/23 in \[facility]\"" with Dr Fatoumata Mulligan for VV procedure

## 2021-06-09 ENCOUNTER — OFFICE VISIT (OUTPATIENT)
Dept: PODIATRY | Facility: CLINIC | Age: 71
End: 2021-06-09
Payer: MEDICARE

## 2021-06-09 VITALS — DIASTOLIC BLOOD PRESSURE: 76 MMHG | SYSTOLIC BLOOD PRESSURE: 122 MMHG | HEIGHT: 65 IN | BODY MASS INDEX: 20.3 KG/M2

## 2021-06-09 DIAGNOSIS — G57.61 MORTON'S NEUROMA OF SECOND INTERSPACE OF RIGHT FOOT: Primary | ICD-10-CM

## 2021-06-09 DIAGNOSIS — G57.61 MORTON'S NEUROMA OF RIGHT FOOT: ICD-10-CM

## 2021-06-09 PROCEDURE — 99203 OFFICE O/P NEW LOW 30 MIN: CPT | Performed by: PODIATRIST

## 2021-06-09 PROCEDURE — 64455 NJX AA&/STRD PLTR COM DG NRV: CPT | Performed by: PODIATRIST

## 2021-06-09 RX ORDER — LIDOCAINE HYDROCHLORIDE 10 MG/ML
1 INJECTION, SOLUTION INFILTRATION; PERINEURAL ONCE
Status: COMPLETED | OUTPATIENT
Start: 2021-06-09 | End: 2021-06-09

## 2021-06-09 RX ORDER — TRIAMCINOLONE ACETONIDE 40 MG/ML
40 INJECTION, SUSPENSION INTRA-ARTICULAR; INTRAMUSCULAR ONCE
Status: COMPLETED | OUTPATIENT
Start: 2021-06-09 | End: 2021-06-09

## 2021-06-09 RX ADMIN — LIDOCAINE HYDROCHLORIDE 1 ML: 10 INJECTION, SOLUTION INFILTRATION; PERINEURAL at 11:30

## 2021-06-09 RX ADMIN — TRIAMCINOLONE ACETONIDE 40 MG: 40 INJECTION, SUSPENSION INTRA-ARTICULAR; INTRAMUSCULAR at 11:30

## 2021-06-09 NOTE — PATIENT INSTRUCTIONS
Wynne Neuroma   WHAT YOU NEED TO KNOW:   What is Wynne neuroma? Wynne neuroma is inflammation of one of the nerves in your foot  It usually occurs in the ball of your foot, between your third and fourth toes  What increases my risk for Wynne neuroma? · Tight shoes or shoes with high heels or pointed toes    · Repeated trauma from high-impact sports, such as running    What are the signs and symptoms of Wynne neuroma? · Pain in your foot, especially when you walk    · Achy or burning sensation    · Feeling like you are stepping on a small stone or a wrinkled sock    · Numbness, tingling, or prickling that may spread to your toes    How is Precilla Martyr neuroma diagnosed? · A foot and ankle exam  will be done by your healthcare provider  He or she will press on your foot to feel for thickened tissue  · An x-ray, ultrasound, or MRI  may be done to check for thickened tissue  These tests can also show if other problems may be causing your pain  Do not enter the MRI room with anything metal  Metal can cause serious injury  Tell the healthcare provider if you have any metal in or on your body  How is Wynne neuroma treated? The goal of treatment is to decrease pressure, pain, and swelling  · NSAIDs  help decrease swelling and pain or fever  This medicine is available with or without a doctor's order  NSAIDs can cause stomach bleeding or kidney problems in certain people  If you take blood thinner medicine, always ask your healthcare provider if NSAIDs are safe for you  Always read the medicine label and follow directions  · An injection  of steroids, ethanol, or numbing medicine may decrease pain and swelling  · Surgery  may be needed if other treatments do not work  The tissues around the nerve may be cut to relieve pressure  The nerve may also be removed completely  How can I manage my symptoms? · Wear flat shoes with a wide toe box    This will decrease the pressure on the front of your foot     · Wear orthotics, arch supports, or foot pads  These help relieve pressure and cushion the ball of your foot  You may need a medical shoe insert ordered by your healthcare provider  · Do an ice massage to decrease pain and swelling  Freeze a paper or foam cup filled with water and roll it under your foot  Do this for 20 minutes, 2 times each day  When should I contact my healthcare provider? · Your symptoms spread to your toes  · Your symptoms do not improve after treatment  · You have questions or concerns about your condition or care  CARE AGREEMENT:   You have the right to help plan your care  Learn about your health condition and how it may be treated  Discuss treatment options with your healthcare providers to decide what care you want to receive  You always have the right to refuse treatment  The above information is an  only  It is not intended as medical advice for individual conditions or treatments  Talk to your doctor, nurse or pharmacist before following any medical regimen to see if it is safe and effective for you  © Copyright [address] Information is for End User's use only and may not be sold, redistributed or otherwise used for commercial purposes   All illustrations and images included in CareNotes® are the copyrighted property of A D A M , Inc  or [address] [place] [address]

## 2021-06-09 NOTE — PROGRESS NOTES
Assessment/Plan:         Diagnoses and all orders for this visit:    Wynne's neuroma of second interspace of right foot  -     triamcinolone acetonide (KENALOG-40) 40 mg/mL injection 40 mg  -     lidocaine (XYLOCAINE) 1 % injection 1 mL    Other orders  -     Nerve block      diagnosis and options discussed  I did review the patient's MRI she which is highly suggestive of Wynne's neuroma in the 2nd interspace  She has had cortisone shots previously which did provide relief for some time but it appears that this benefit may be diminishing  We discussed possible need for surgical intervention if necessary  We discussed the procedure and recovery as can best be predicted  She did request 1 final steroid injection before making a surgical decision which seems reasonable  See procedure below  It did improve her pain almost immediately  Check in 4 weeks  Nerve block    Date/Time: 6/9/2021 11:50 AM  Performed by: Lesley Harley DPM  Authorized by: Lesley Harley DPM     Patient location:  [facility] Protocol:  Consent: Verbal consent obtained  Risks and benefits: risks, benefits and alternatives were discussed  Consent given by: patient  Timeout called at: 6/9/2021 11:50 AM   Patient understanding: patient states understanding of the procedure being performed  Patient identity confirmed: verbally with patient      Indications:     Indications:  Pain relief  Location:     Body area:  Lower extremity    Lower extremity nerve:  Plantar    Nerve type:  Peripheral    Laterality:  Right (2nd interspace)  Pre-procedure details:     Skin preparation:  Alcohol  Skin anesthesia (see MAR for exact dosages):     Skin anesthesia method:  None  Procedure details (see MAR for exact dosages):      Block needle gauge:  25 G    Anesthetic injected:  Lidocaine 1% w/o epi (1cc)    Steroid injected:  Triamcinolone (0 5cc)    Injection procedure:  Anatomic landmarks identified and anatomic landmarks palpated  Post-procedure details:     Dressing:  Sterile dressing    Outcome:  Pain improved    Patient tolerance of procedure: Tolerated well, no immediate complications          Subjective:      Patient ID: Alex Oviedo is a 73 y o  female  PAtient began having right foot pain last September  She got this pain out of nowhere  She was diagnosed with possible stress fracture and put in a boot  She did not get any better after 6 weeks and an MRI was ordered  It showed a mortons neuroma  She was given a cortisone shot twice which helped temporarily but the pain came back and is now even worse than before  She is walking funny and now getting knee pain on the right  The following portions of the patient's history were reviewed and updated as appropriate: allergies, current medications, past family history, past medical history, past social history, past surgical history and problem list     Review of Systems   Constitutional: Negative  HENT: Negative for sinus pressure and sinus pain  Respiratory: Negative for cough and shortness of breath  Cardiovascular: Negative for chest pain and leg swelling  Gastrointestinal: Negative for diarrhea, nausea and vomiting  Musculoskeletal: Positive for arthralgias  Negative for gait problem and joint swelling  Skin: Negative for color change and wound  Neurological: Positive for numbness (Burning in forefoot)  Negative for weakness and headaches  Objective:      /76   Ht 5' 5" (1 651 m)   LMP  (LMP Unknown)   BMI 20 30 kg/m²          Physical Exam      Vitals reviewed    Constitutional: Patient is not distressed  Patient is well developed  Patient is healthy weight  Vascular: Dorsalis pedis and posterior tibial pulses palpable  Capillary refill time within normal limits to all digits  No erythema  No edema  No significant varicosities  Dermatology: No rash  No open lesions  Present pedal hair     Skin has healthy turgor  Musculoskeletal: Normal range of motion to ankle, subtalar joint, and midtarsal joint  Normal range of motion first MTPJ  Manual muscle testing 5 out of 5 for inversion/eversion/dorsiflexion/plantarflexion  On stance patients feet are generally rectus  No instability at the metatarsophalangeal joints    Neurological: Monofilament sensation is intact  Vibratory sensation is intact  Achilles reflex is normal    Proprioception is normal  Positive Wynne sign 2nd interspace right foot    Respiratory: Normal respiratory effort, no distress    Psych: Patient is AAOx3  Normal mood       Lymphatic: nonpalpable popliteal lymph nodes  Nonpalpable groin lymph nodes

## 2021-06-10 ENCOUNTER — TELEPHONE (OUTPATIENT)
Dept: PODIATRY | Facility: CLINIC | Age: 71
End: 2021-06-10

## 2021-06-10 DIAGNOSIS — I83.893 SYMPTOMATIC VARICOSE VEINS OF BOTH LOWER EXTREMITIES: Primary | ICD-10-CM

## 2021-06-10 NOTE — TELEPHONE ENCOUNTER
LMOM for patient about 7/13/21 in [facility] with Dr Jennifer Antoine will be on vacation and has to find out if another rep is available

## 2021-06-10 NOTE — TELEPHONE ENCOUNTER
Rec call back from [facility] and she would like to have her procedure but needs to know if the injections she is receiving for Mortons Neuroma will interfere w/the procedure  Her next scheduled inj is 7/7/21 and would like to schedule EVLT on 7/13/21  She stated the meds are Lidacain and [address]  Please advise

## 2021-06-10 NOTE — TELEPHONE ENCOUNTER
Patient called and LM on surgery line in regards to questions about next injection  Patient wants to know if there is any contraindications with getting her 2nd foot injection on 7/7/21 and the possibility of a varicose vein ablation about 1 week later  Does she need to cancel the injection of is it ok to have the injection that close to the ablation? Patient states that vascular asked her to contact us to make sure there was not an issue  Please advise

## 2021-06-14 ENCOUNTER — PREP FOR PROCEDURE (OUTPATIENT)
Dept: VASCULAR SURGERY | Facility: CLINIC | Age: 71
End: 2021-06-14

## 2021-06-14 DIAGNOSIS — I83.893 SYMPTOMATIC VARICOSE VEINS OF BOTH LOWER EXTREMITIES: Primary | ICD-10-CM

## 2021-06-14 NOTE — TELEPHONE ENCOUNTER
LMOM for patient advising her of Dr Racquel Pradhan reply and to see if she is available on Tuesday 6/22/21  Rec email from [phone] that he can have an assistant available to assist w/laser

## 2021-06-14 NOTE — TELEPHONE ENCOUNTER
Is patient requesting a call when authorization has been obtained? Patient did not request a call  Surgery Date: 7/13/21  Primary Surgeon: Anita Rosario (NPI: [id number])  Assisting Surgeon: Not Applicable (N/A)  Facility: [facility] (Tax: [id number] / NPI: [id number])  Inpatient / Outpatient: Outpatient  Level: 3    Clearance Received: No clearance ordered  Consent Received: Yes, scanned into Epic on 3/22/21  Medication Hold / Last Dose: Not Applicable (N/A)  VQI Spreadsheet: Not Applicable (N/A)  IR Notified: Not Applicable (N/A)  Rep  Notified: Possibly angiodynamics Parminder Meza  Equipment Needs: Not Applicable (N/A)  Vas Lab Requested: 6/14/21  Patient Contacted: 6/14/21    Diagnosis: [id number]  Procedure/ CPT Code(s): (EVLT) Endovenous Laser Treatment WITH Stab Phlebectomies of the right upper leg // CPT: 88367, 35869     For varicose vein related procedures, last LEVDR? 3/12/21 VAS Lower extremity venous insufficiency duplex, Single leg w/ measurements, patient's LEVDR was completed within 6-months of their procedure date  Post Operative Date/ Time: To Be Determined (TBD)     *Please review with patient med hold, PATs, and check H&P *  PATIENT WAS MAILED SURGERY/SHOWERING/DISCHARGE/COVID INSTRUCTIONS AFTER REVIEWING WITH HER VIA PHONE CALL  Will call patient back with post op and advise she will need BMP and EKG      VAS Lower extremity venous insufficiency duplex, Single leg w/ measurements was done 3/12/21 not a LEVDR  Let me know if one has to be scheduled

## 2021-06-16 NOTE — TELEPHONE ENCOUNTER
Authorization requirements reviewed  Please refer to Vasile Vazquez / [employer]  number [id number] for case updates

## 2021-06-22 ENCOUNTER — CLINICAL SUPPORT (OUTPATIENT)
Dept: URGENT CARE | Facility: MEDICAL CENTER | Age: 71
End: 2021-06-22
Payer: MEDICARE

## 2021-06-22 ENCOUNTER — APPOINTMENT (OUTPATIENT)
Dept: LAB | Facility: MEDICAL CENTER | Age: 71
End: 2021-06-22
Payer: MEDICARE

## 2021-06-22 DIAGNOSIS — I83.893 SYMPTOMATIC VARICOSE VEINS OF BOTH LOWER EXTREMITIES: ICD-10-CM

## 2021-06-22 LAB
ANION GAP SERPL CALCULATED.3IONS-SCNC: 5 MMOL/L (ref 4–13)
ATRIAL RATE: 70 BPM
BUN SERPL-MCNC: 11 MG/DL (ref 5–25)
CALCIUM SERPL-MCNC: 8.9 MG/DL (ref 8.3–10.1)
CHLORIDE SERPL-SCNC: 108 MMOL/L (ref 100–108)
CO2 SERPL-SCNC: 27 MMOL/L (ref 21–32)
CREAT SERPL-MCNC: 0.66 MG/DL (ref 0.6–1.3)
GFR SERPL CREATININE-BSD FRML MDRD: 90 ML/MIN/1.73SQ M
GLUCOSE P FAST SERPL-MCNC: 87 MG/DL (ref 65–99)
POTASSIUM SERPL-SCNC: 4.2 MMOL/L (ref 3.5–5.3)
PR INTERVAL: 142 MS
QRS AXIS: 79 DEGREES
QRSD INTERVAL: 78 MS
QT INTERVAL: 392 MS
QTC INTERVAL: 423 MS
SODIUM SERPL-SCNC: 140 MMOL/L (ref 136–145)
T WAVE AXIS: 67 DEGREES
VENTRICULAR RATE: 70 BPM

## 2021-06-22 PROCEDURE — 93010 ELECTROCARDIOGRAM REPORT: CPT | Performed by: INTERNAL MEDICINE

## 2021-06-22 PROCEDURE — 80048 BASIC METABOLIC PNL TOTAL CA: CPT

## 2021-06-22 PROCEDURE — 93005 ELECTROCARDIOGRAM TRACING: CPT

## 2021-06-22 PROCEDURE — 36415 COLL VENOUS BLD VENIPUNCTURE: CPT

## 2021-06-25 NOTE — TELEPHONE ENCOUNTER
Rec call from patient advising me her EKG was abnormal and wanted to know what to do next  Sent this to Dr Marika Benson to see if she requires cardical clearance

## 2021-07-02 ENCOUNTER — ANESTHESIA EVENT (OUTPATIENT)
Dept: PERIOP | Facility: HOSPITAL | Age: 71
End: 2021-07-02
Payer: MEDICARE

## 2021-07-02 RX ORDER — MULTIVIT-MIN/IRON/FOLIC ACID/K 18-600-40
CAPSULE ORAL DAILY
COMMUNITY
End: 2021-09-09

## 2021-07-02 RX ORDER — MULTIVITAMIN
1 TABLET ORAL DAILY
COMMUNITY
End: 2021-09-09

## 2021-07-02 NOTE — PRE-PROCEDURE INSTRUCTIONS
Pre-Surgery Instructions:   Medication Instructions    acetaminophen (TYLENOL) 325 mg tablet Instructed patient per Anesthesia Guidelines   albuterol (PROVENTIL HFA,VENTOLIN HFA) 90 mcg/act inhaler Instructed patient per Anesthesia Guidelines   COD LIVER OIL PO Instructed patient per Anesthesia Guidelines   EPINEPHrine (EPIPEN) 0 3 mg/0 3 mL SOAJ Instructed patient per Anesthesia Guidelines   ketotifen (ZADITOR) 0 025 % ophthalmic solution Instructed patient per Anesthesia Guidelines   Multiple Vitamin (multivitamin) tablet Instructed patient per Anesthesia Guidelines   Multiple Vitamins-Minerals (PRESERVISION AREDS PO) Instructed patient per Anesthesia Guidelines   Vitamin D, Cholecalciferol, 25 MCG (1000 UT) TABS Instructed patient per Anesthesia Guidelines  No meds DOS  You will receive a phone call from hospital for arrival time  Please call surgeons office if any changes in your condition  Wear easy on/off clothing; consider type of surgery;  Valuables, jewelry, piercing's please keep at home  **COVID-19  education/surgical guidelines      Please: No contact lenses or eye make up, artificial eyelashes    Please secure transportation     Follow pre surgery showering or cleaning instructions as  Reviewed by nurse or surgeons office      Questions answered and concerns addressed

## 2021-07-06 ENCOUNTER — PREP FOR PROCEDURE (OUTPATIENT)
Dept: VASCULAR SURGERY | Facility: CLINIC | Age: 71
End: 2021-07-06

## 2021-07-07 ENCOUNTER — OFFICE VISIT (OUTPATIENT)
Dept: PODIATRY | Facility: CLINIC | Age: 71
End: 2021-07-07
Payer: MEDICARE

## 2021-07-07 ENCOUNTER — OFFICE VISIT (OUTPATIENT)
Dept: FAMILY MEDICINE CLINIC | Facility: CLINIC | Age: 71
End: 2021-07-07
Payer: MEDICARE

## 2021-07-07 VITALS
BODY MASS INDEX: 20.49 KG/M2 | WEIGHT: 123 LBS | DIASTOLIC BLOOD PRESSURE: 82 MMHG | HEIGHT: 65 IN | SYSTOLIC BLOOD PRESSURE: 122 MMHG

## 2021-07-07 VITALS
HEIGHT: 65 IN | SYSTOLIC BLOOD PRESSURE: 110 MMHG | BODY MASS INDEX: 20.62 KG/M2 | DIASTOLIC BLOOD PRESSURE: 80 MMHG | WEIGHT: 123.8 LBS | TEMPERATURE: 97.9 F

## 2021-07-07 DIAGNOSIS — G57.61 MORTON'S NEUROMA OF SECOND INTERSPACE OF RIGHT FOOT: Primary | ICD-10-CM

## 2021-07-07 DIAGNOSIS — R94.31 ABNORMAL EKG: Primary | ICD-10-CM

## 2021-07-07 DIAGNOSIS — Z01.818 PREOPERATIVE CLEARANCE: ICD-10-CM

## 2021-07-07 DIAGNOSIS — E53.8 B12 DEFICIENCY: ICD-10-CM

## 2021-07-07 PROCEDURE — 64455 NJX AA&/STRD PLTR COM DG NRV: CPT | Performed by: PODIATRIST

## 2021-07-07 PROCEDURE — 99213 OFFICE O/P EST LOW 20 MIN: CPT | Performed by: FAMILY MEDICINE

## 2021-07-07 RX ORDER — CYANOCOBALAMIN 1000 UG/ML
1000 INJECTION INTRAMUSCULAR; SUBCUTANEOUS
Status: CANCELLED | OUTPATIENT
Start: 2021-07-07

## 2021-07-07 RX ORDER — LIDOCAINE HYDROCHLORIDE 10 MG/ML
1 INJECTION, SOLUTION INFILTRATION; PERINEURAL ONCE
Status: COMPLETED | OUTPATIENT
Start: 2021-07-07 | End: 2021-07-07

## 2021-07-07 RX ORDER — CYANOCOBALAMIN 1000 UG/ML
1000 INJECTION INTRAMUSCULAR; SUBCUTANEOUS
Status: DISCONTINUED | OUTPATIENT
Start: 2021-07-07 | End: 2022-06-29

## 2021-07-07 RX ORDER — TRIAMCINOLONE ACETONIDE 40 MG/ML
40 INJECTION, SUSPENSION INTRA-ARTICULAR; INTRAMUSCULAR ONCE
Status: COMPLETED | OUTPATIENT
Start: 2021-07-07 | End: 2021-07-07

## 2021-07-07 RX ADMIN — CYANOCOBALAMIN 1000 MCG: 1000 INJECTION INTRAMUSCULAR; SUBCUTANEOUS at 12:10

## 2021-07-07 RX ADMIN — LIDOCAINE HYDROCHLORIDE 1 ML: 10 INJECTION, SOLUTION INFILTRATION; PERINEURAL at 08:42

## 2021-07-07 RX ADMIN — TRIAMCINOLONE ACETONIDE 40 MG: 40 INJECTION, SUSPENSION INTRA-ARTICULAR; INTRAMUSCULAR at 08:42

## 2021-07-07 NOTE — PATIENT INSTRUCTIONS
foot     · Wear orthotics, arch supports, or foot pads  These help relieve pressure and cushion the ball of your foot  You may need a medical shoe insert ordered by your healthcare provider  · Do an ice massage to decrease pain and swelling  Freeze a paper or foam cup filled with water and roll it under your foot  Do this for 20 minutes, 2 times each day  When should I contact my healthcare provider? · Your symptoms spread to your toes  · Your symptoms do not improve after treatment  · You have questions or concerns about your condition or care  CARE AGREEMENT:   You have the right to help plan your care  Learn about your health condition and how it may be treated  Discuss treatment options with your healthcare providers to decide what care you want to receive  You always have the right to refuse treatment  The above information is an  only  It is not intended as medical advice for individual conditions or treatments  Talk to your doctor, nurse or pharmacist before following any medical regimen to see if it is safe and effective for you  © Copyright [address] Information is for End User's use only and may not be sold, redistributed or otherwise used for commercial purposes   All illustrations and images included in CareNotes® are the copyrighted property of [employer]  or [address] [place] [address]

## 2021-07-07 NOTE — TELEPHONE ENCOUNTER
Rec call from patient this morning canceling her R EVLT w/phlebs w/Dr Nav Appiah on Tuesday 7/13/21  Her PCP reviewed her abnormal EKG and has referred her to cardiology   She will call us back to reschedule once she discusses this with the cardiologist

## 2021-07-07 NOTE — TELEPHONE ENCOUNTER
Hitesh Landon / Sarah Levo number [id number] has been updated to reflect cancellation  Existing authorization is valid for the [facility] until 10/20/21

## 2021-07-07 NOTE — PROGRESS NOTES
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  -     triamcinolone acetonide (KENALOG-40) 40 mg/mL injection 40 mg  -     lidocaine (XYLOCAINE) 1 % injection 1 mL      Nerve block    Date/Time: 7/7/2021 8:38 AM  Performed by: Mariel Collins DPM  Authorized by: Mariel Collins DPM     Patient location:  Cl[facility] Protocol:  Consent: Verbal consent obtained  Consent given by: patient  Timeout called at: 7/7/2021 8:38 AM   Patient understanding: patient states understanding of the procedure being performed  Patient identity confirmed: verbally with patient      Indications:     Indications:  Pain relief  Location:     Body area:  Lower extremity    Lower extremity nerve:  Plantar    Laterality:  Right (2nd interspace)  Pre-procedure details:     Skin preparation:  Alcohol  Procedure details (see MAR for exact dosages): Block needle gauge:  25 G    Anesthetic injected:  Lidocaine 1% w/o epi (1cc)    Steroid injected:  Triamcinolone (0 5cc)    Additive injected:  None    Injection procedure:  Anatomic landmarks identified and anatomic landmarks palpated  Post-procedure details:     Dressing:  Sterile dressing    Outcome:  Pain improved    Patient tolerance of procedure: Tolerated well, no immediate complications      Patient previously had cortisone injections and this is her 2nd of the current round  If the room a continues to be an issue I highly recommend she consider excision of the neuroma  She is wearing good shoes and appropriate padding  She may call me as needed  Subjective:     Patient ID: Kala Dowd is a 73 y o  female  PAtient had right second interspace neuroma chronically  She has had shots in the past  It was acutely painful last month and we repeated a cortisone injection  She reports 75% improvement but states this past week it started to get a little worse         Review of Systems Objective:     Physical Exam  Cardiovascular:      Pulses:           Dorsalis pedis pulses are 2+ on the right side  Posterior tibial pulses are 2+ on the right side  Musculoskeletal:      Right foot: Normal range of motion  No deformity  Feet:    Feet:      Right foot:      Protective Sensation: 10 sites tested  10 sites sensed  Neurological:      General: No focal deficit present  Deep Tendon Reflexes:      Reflex Scores:       Achilles reflexes are 2+ on the right side and 2+ on the left side       Comments: Positive Wynne sign right 2nd interspace

## 2021-07-09 NOTE — PROGRESS NOTES
Assessment/Plan:    82-year-old woman with:  Abnormal EKG will refer to Cardiology for evaluation discussed supportive care return parameters otherwise    No problem-specific Assessment & Plan notes found for this encounter  Diagnoses and all orders for this visit:    Abnormal EKG  -     Ambulatory referral to Cardiology; Future    Preoperative clearance  -     Ambulatory referral to Cardiology; Future    B12 deficiency  -     cyanocobalamin injection 1,000 mcg    Other orders  -     Cancel: cyanocobalamin injection 1,000 mcg          Subjective:     Chief Complaint   Patient presents with    Pre-op Exam     vericose vein surgery    B12 Injection     pt would like her injection today        Patient ID: Sandra Feliciano is a 73 y o  female  Patient is a 82-year-old woman who presents for follow-up she is being cleared for upcoming procedure but EKG did show new changes in particular inverted P-waves no fevers chills nausea vomiting no chest pain or shortness of breath      The following portions of the patient's history were reviewed and updated as appropriate: allergies, current medications, past family history, past medical history, past social history, past surgical history and problem list     Review of Systems   Constitutional: Negative  HENT: Negative  Eyes: Negative  Respiratory: Negative  Cardiovascular: Negative  Gastrointestinal: Negative  Endocrine: Negative  Genitourinary: Negative  Musculoskeletal: Negative  Allergic/Immunologic: Negative  Neurological: Negative  Hematological: Negative  Psychiatric/Behavioral: Negative  All other systems reviewed and are negative          Objective:      /80 (BP Location: Right arm, Patient Position: Sitting, Cuff Size: Standard)   Temp 97 9 °F (36 6 °C) (Tympanic)   Ht 5' 5" (1 651 m)   Wt 56 2 kg (123 lb 12 8 oz)   LMP  (LMP Unknown)   BMI 20 60 kg/m²          Physical Exam  Constitutional:       Appearance: She is well-developed  HENT:      Head: Atraumatic  Right Ear: External ear normal       Left Ear: External ear normal    Eyes:      Conjunctiva/sclera: Conjunctivae normal       Pupils: Pupils are equal, round, and reactive to light  Cardiovascular:      Rate and Rhythm: Normal rate and regular rhythm  Heart sounds: Normal heart sounds  Pulmonary:      Effort: Pulmonary effort is normal  No respiratory distress  Breath sounds: Normal breath sounds  Abdominal:      General: Bowel sounds are normal  There is no distension  Palpations: Abdomen is soft  Tenderness: There is no abdominal tenderness  There is no guarding or rebound  Musculoskeletal:         General: Normal range of motion  Cervical back: Normal range of motion  Skin:     General: Skin is warm and dry  Neurological:      Mental Status: She is alert and oriented to person, place, and time  Cranial Nerves: No cranial nerve deficit  Psychiatric:         Behavior: Behavior normal          Thought Content:  Thought content normal          Judgment: Judgment normal

## 2021-07-13 ENCOUNTER — ANESTHESIA (OUTPATIENT)
Dept: PERIOP | Facility: HOSPITAL | Age: 71
End: 2021-07-13
Payer: MEDICARE

## 2021-08-02 NOTE — TELEPHONE ENCOUNTER
Rc call from patient stating she rec an EOB from her insurance carrier about a procedure done on 7/7/21 for anesthesia and had questions about it since she never had a procedure  Advised her to call the number on the bill for assistance

## 2021-08-08 PROBLEM — Z01.810 PREOPERATIVE CARDIOVASCULAR EXAMINATION: Status: ACTIVE | Noted: 2021-07-07

## 2021-08-09 ENCOUNTER — CONSULT (OUTPATIENT)
Dept: CARDIOLOGY CLINIC | Facility: CLINIC | Age: 71
End: 2021-08-09
Payer: MEDICARE

## 2021-08-09 VITALS
DIASTOLIC BLOOD PRESSURE: 80 MMHG | WEIGHT: 123.6 LBS | HEART RATE: 86 BPM | BODY MASS INDEX: 20.57 KG/M2 | SYSTOLIC BLOOD PRESSURE: 128 MMHG

## 2021-08-09 DIAGNOSIS — Z01.818 PREOPERATIVE CLEARANCE: ICD-10-CM

## 2021-08-09 DIAGNOSIS — Z01.810 PREOPERATIVE CARDIOVASCULAR EXAMINATION: Primary | ICD-10-CM

## 2021-08-09 DIAGNOSIS — E78.2 MIXED HYPERLIPIDEMIA: ICD-10-CM

## 2021-08-09 DIAGNOSIS — R07.2 PRECORDIAL PAIN: ICD-10-CM

## 2021-08-09 DIAGNOSIS — I83.893 SYMPTOMATIC VARICOSE VEINS OF BOTH LOWER EXTREMITIES: ICD-10-CM

## 2021-08-09 DIAGNOSIS — R94.31 ABNORMAL EKG: ICD-10-CM

## 2021-08-09 PROCEDURE — 93000 ELECTROCARDIOGRAM COMPLETE: CPT | Performed by: INTERNAL MEDICINE

## 2021-08-09 PROCEDURE — 99203 OFFICE O/P NEW LOW 30 MIN: CPT | Performed by: INTERNAL MEDICINE

## 2021-08-09 NOTE — PROGRESS NOTES
[facility]  [address] [facility], [place] [address] [facility]  Phone#  [phone]   Fax#  [phone]  *-*-*-*-*-*-*-*-*-*-*-*-*-*-*-*-*-*-*-*-*-*-*-*-*-*-*-*-*-*-*-*-*-*-*-*-*-*-*-*-*-*-*-*-*-*-*-*-*-*-*-*-*-*                                              Cardiology Consultation     Please see addendum added 2021    ENCOUNTER DATE: 21 3:13 PM  PATIENT NAME: Soni Dennis   : 1950    MRN: [id number]  AGE:71 y o  SEX: female  [address] Kaya Mckeon MD     PRIMARY CARE PHYSICIAN: Ann-Marie Tilley MD    ACTIVE DIAGNOSIS THIS VISIT  1  Preoperative cardiovascular examination  POCT ECG   2  Symptomatic varicose veins of both lower extremities     3  Mixed hyperlipidemia     4  Abnormal EKG  Ambulatory referral to Cardiology   5  Preoperative clearance  Ambulatory referral to Cardiology     ACTIVE PROBLEM LIST  Patient Active Problem List   Diagnosis    Recurrent major depressive disorder, in partial remission ([address] )    Hyperlipidemia    Pleural thickening    Symptomatic varicose veins of both lower extremities    Vitamin D deficiency    Changing skin lesion    Asthma    Other acute recurrent sinusitis    Dermatitis    Superficial phlebitis of leg, right    Medicare annual wellness visit, subsequent    Left knee injury    Left shoulder pain    Acute upper respiratory infection    Cerumen impaction    Depression    Right foot pain    RLQ abdominal tenderness    Bug bite    Protein-calorie malnutrition ([address] )    Wynne's neuroma of right foot    Chronic pain of right ankle    Right knee injury    Abnormal EKG    Preoperative cardiovascular examination       HPI:    Patient is here for symptomatic varicose vein ablation of both lower extremities  She has no cardiac history  However, she complains of discomfort in her left shoulder which she relates to gardening  She also complains of substernal discomfort which she relates to eating hot peppers    She has a brother who  in his early 62s in his sleep but had been complaining of left shoulder pain which she attributes to him chopping wood  She is a former smoker quitting in   Her brother quit 2 years prior to his episode of sudden death in his sleep  Her father also had heart disease  Patient does not feel she can walk on a treadmill  She has a Wynne's neuroma which she states would be extremely painful for her to walk on a treadmill  Patient's EKG today is normal except for some PVCs  Her previous EKG was read abnormal for low voltage an ectopic atrial rhythm  Neither of these diagnoses are of significance for a surgical procedure or for coronary artery disease  I am most concerned about her history  Rather than her EKG  Her cholesterol is acceptable with an LDL of 102  DISCUSSION/PLAN:    1  Pharmacologic nuclear stress test  2  Echocardiogram  3  Will risk stratify her for surgical procedure following the stress test and echocardiogram     ADDENDUM 2021   1  Echocardiogram:  Left ventricle is normal in size and function with an LVEF of 55%  Grade 1 diastolic dysfunction  Report states moderate inferior basilar wall hypokinesis although I have reviewed the echocardiogram and am not convinced of this finding  In addition, nuclear stress test does not support this finding  Mild-to-moderate aortic regurgitation and mild-to-moderate tricuspid regurgitation with normal pulmonary artery pressures  2  Nuclear stress test:  Normal left ventricular ejection fraction, 72%  No regional wall motion abnormalities identified  Normal tomographic perfusion scan without perfusion defects  No evidence of prior myocardial infarction and no evidence of pharmacologic induced ischemia  Summary:  1  Recommend proceeding with surgery as planned  2  Patient cardiovascular risk graded to be mild to moderate   3   Echocardiogram and stress nuclear stress test reports are in their entirety at the end of this note      Lab Studies:    Lab Results   Component Value Date    CHOLESTEROL 207 (H) 08/07/2020    CHOLESTEROL 186 04/13/2019    CHOLESTEROL 186 10/14/2016     Lab Results   Component Value Date    TRIG 61 08/07/2020    TRIG 111 04/13/2019    TRIG 68 10/14/2016     Lab Results   Component Value Date    HDL 93 08/07/2020    HDL 77 04/13/2019     (H) 10/14/2016     Lab Results   Component Value Date    LDLCALC 102 (H) 08/07/2020    LDLCALC 88 04/13/2019    LDLCALC 71 10/14/2016     Lab Results   Component Value Date    NONHDL 109 04/13/2019       Lab Results   Component Value Date    EGFR 90 06/22/2021    EGFR 89 05/10/2021    EGFR 88 08/07/2020    SODIUM 140 06/22/2021    SODIUM 137 05/10/2021    SODIUM 138 08/07/2020    K 4 2 06/22/2021    K 4 4 05/10/2021    K 4 6 08/07/2020     06/22/2021     05/10/2021     08/07/2020    CO2 27 06/22/2021    CO2 29 05/10/2021    CO2 27 08/07/2020    BUN 11 06/22/2021    BUN 12 05/10/2021    BUN 13 08/07/2020    CREATININE 0 66 06/22/2021    CREATININE 0 69 05/10/2021    CREATININE 0 71 08/07/2020     Lab Results   Component Value Date    WBC 6 27 05/10/2021    WBC 6 73 08/07/2020    WBC 7 8 04/13/2019    HGB 14 4 05/10/2021    HGB 14 3 08/07/2020    HGB 14 9 04/13/2019    HCT 44 7 05/10/2021    HCT 43 9 08/07/2020    HCT 45 1 (H) 04/13/2019    MCV 91 05/10/2021    MCV 92 08/07/2020    MCV 90 7 04/13/2019    MCH 29 4 05/10/2021    MCH 29 9 08/07/2020    MCH 30 0 04/13/2019    MCHC 32 2 05/10/2021    MCHC 32 6 08/07/2020    MCHC 33 0 04/13/2019     05/10/2021     08/07/2020     04/13/2019        Lab Results   Component Value Date    CALCIUM 8 9 06/22/2021    CALCIUM 8 6 05/10/2021    CALCIUM 9 0 08/07/2020    AST 19 05/10/2021    AST 20 08/07/2020    AST 17 04/13/2019    ALT 24 05/10/2021    ALT 23 08/07/2020    ALT 12 04/13/2019    ALKPHOS 74 05/10/2021    ALKPHOS 78 08/07/2020    ALKPHOS 71 04/13/2019    PROT 6 7 08/11/2017 BILITOT 0 4 08/11/2017       Results for orders placed or performed in visit on 08/09/21   POCT ECG    Narrative     Normal sinus rhythm at a rate 87 beats per minute  Occasional premature ventricular contractions  Otherwise normal EKG           Current Outpatient Medications:     albuterol (PROVENTIL HFA,VENTOLIN HFA) 90 mcg/act inhaler, Inhale 1 puff as needed for wheezing, Disp: 8 5 g, Rfl: 2    ketotifen (ZADITOR) 0 025 % ophthalmic solution, Administer 1 drop to both eyes 2 (two) times a day as needed (eye itching or discomfort), Disp: 5 mL, Rfl: 0    acetaminophen (TYLENOL) 325 mg tablet, Take 650 mg by mouth every 6 (six) hours as needed for mild pain (Patient not taking: Reported on 8/9/2021), Disp: , Rfl:     COD LIVER OIL PO, Take by mouth daily  (Patient not taking: Reported on 7/7/2021), Disp: , Rfl:     EPINEPHrine (EPIPEN) 0 3 mg/0 3 mL SOAJ, Inject 0 3 mL (0 3 mg total) into a muscle once for 1 dose, Disp: 0 6 mL, Rfl: 0    Multiple Vitamin (multivitamin) tablet, Take 1 tablet by mouth daily (Patient not taking: Reported on 7/7/2021), Disp: , Rfl:     Multiple Vitamins-Minerals (PRESERVISION AREDS PO), Take by mouth daily  (Patient not taking: Reported on 7/7/2021), Disp: , Rfl:     Vitamin D, Cholecalciferol, 25 MCG (1000 UT) TABS, Take by mouth daily (Patient not taking: Reported on 7/7/2021), Disp: , Rfl:     Current Facility-Administered Medications:     cyanocobalamin injection 1,000 mcg, 1,000 mcg, Intramuscular, Q30 Days, Italo Winters MD, 1,000 mcg at 02/17/21 1014    cyanocobalamin injection 1,000 mcg, 1,000 mcg, Intramuscular, Q30 Days, Italo Winters MD, 1,000 mcg at 11/20/20 1025    cyanocobalamin injection 1,000 mcg, 1,000 mcg, Intramuscular, Q30 Days, Italo Winters MD, 1,000 mcg at 07/07/21 1210  Allergies   Allergen Reactions    Bee Venom Hives    Gabapentin Tinnitus     Side of head numb    Wasp Venom Protein Hives    Ambrosia Artemisiifolia (Ragweed) Skin Test     Dog Epithelium Allergy Skin Test     Mold Extract  [Trichophyton]     Cat Hair Extract Allergic Rhinitis    Codeine Rash     As child, tolerated as adult      Penicillins Rash    Soy Isoflavones Rash       Past Medical History:   Diagnosis Date    Allergic     penicillin,ragweed,cat,dog,mold,grass,moderate soy    Anxiety     Breast cancer ([address] ) 2003    left breast phyllodes    Cancer 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   Tobacco Use    Smoking status: Former Smoker     Packs/day: 0 25     Years: 20 00     Pack years: 5 00     Quit date:      Years since quittin 6    Smokeless tobacco: Never Used   Vaping Use    Vaping Use: Never used   Substance and Sexual Activity    Alcohol use: Not Currently     Alcohol/week: 1 0 standard drinks     Types: 1 Cans of beer per week     Comment: occasional    Drug use: No    Sexual activity: Not Currently   Other Topics Concern    Not on file   Social History Narrative    Always uses seatbelt        Caffeine use consumes 2 cups of coffee per day         Social Determinants of Health     Financial Resource Strain: Low Risk     Difficulty of Paying Living Expenses: Not hard at all   Food Insecurity: No Food Insecurity    Worried About Running Out of Food in the Last Year: Never true    Nathaniel of Food in the Last Year: Never true   Transportation Needs: No Transportation Needs    Lack of Transportation (Medical): No    Lack of Transportation (Non-Medical): No   Physical Activity: Sufficiently Active    Days of Exercise per Week: 7 days    Minutes of Exercise per Session: 30 min   Stress: No Stress Concern Present    Feeling of Stress :  Only a little   Social Connections: Moderately Isolated    Frequency of Communication with Friends and Family: More than three times a week    Frequency of Social Gatherings with Friends and Family: Three times a week    Attends Spiritism Services: More than 4 times per year    Active Member of Clubs or Organizations: No    Attends Club or Organization Meetings: Never    Marital Status:    Intimate Partner Violence: Not At Risk    Fear of Current or Ex-Partner: No    Emotionally Abused: No    Physically Abused: No    Sexually Abused: No      Family History   Problem Relation Age of Onset    Uterine cancer Mother     Varicose Veins Mother     Hypertension Mother     Dysfunctional uterine bleeding Mother     Endometrial cancer Mother 80    COPD Father     Heart disease Father         Cardiac disorder    Heart attack Brother     Diabetes Family     COPD Sister     Lung cancer Sister 72    No Known Problems Maternal Grandmother     No Known Problems Maternal Grandfather     No Known Problems Paternal Grandmother     No Known Problems Paternal Grandfather      Past Surgical History:   Procedure Laterality Date    BREAST BIOPSY Right 2005    benign    BREAST LUMPECTOMY Left 2003    phyllodes    COLONOSCOPY      TONSILLECTOMY      WISDOM TOOTH EXTRACTION         Review of Systems:  Review of Systems   Constitutional: Negative  HENT: Negative  Respiratory: Negative for chest tightness and shortness of breath  Cardiovascular: Positive for chest pain  Negative for leg swelling  Left shoulder pain   Gastrointestinal: Negative  Endocrine: Negative  Genitourinary: Negative  Musculoskeletal: Negative  Skin: Negative  Allergic/Immunologic: Negative  Neurological: Negative  Hematological: Negative  Psychiatric/Behavioral: Negative          Physical Exam:  /80 (BP Location: Left arm, Patient Position: Sitting, Cuff Size: Adult)   Pulse 86   Wt 56 1 kg (123 lb 9 6 oz)   LMP  (LMP Unknown)   BMI 20 57 kg/m²     PREVIOUS WEIGHTS:   Wt Readings from Last 10 Encounters:   08/09/21 56 1 kg (123 lb 9 6 oz)   07/07/21 56 2 kg (123 lb 12 8 oz)   07/07/21 55 8 kg (123 lb)   05/21/21 55 3 kg (122 lb)   05/19/21 55 4 kg (122 lb 3 2 oz)   04/14/21 58 kg (127 lb 12 8 oz)   03/22/21 56 7 kg (125 lb)   03/11/21 57 2 kg (126 lb)   03/03/21 57 6 kg (127 lb)   02/17/21 56 7 kg (125 lb)      Physical Exam  Constitutional:       Appearance: She is well-developed  HENT:      Head: Normocephalic and atraumatic  Neck:      Thyroid: No thyromegaly  Vascular: No carotid bruit or JVD  Trachea: No tracheal deviation  Cardiovascular:      Rate and Rhythm: Normal rate and regular rhythm  Heart sounds: Normal heart sounds  No murmur heard  No friction rub  No gallop  Pulmonary:      Effort: Pulmonary effort is normal  No respiratory distress  Breath sounds: Normal breath sounds  No wheezing, rhonchi or rales  Abdominal:      General: Bowel sounds are normal       Palpations: Abdomen is soft  Musculoskeletal:         General: Normal range of motion  Cervical back: Normal range of motion and neck supple  Right lower leg: No edema  Left lower leg: No edema  Skin:     General: Skin is warm and dry  Neurological:      General: No focal deficit present  Mental Status: She is alert and oriented to person, place, and time  Psychiatric:         Mood and Affect: Mood normal          Behavior: Behavior normal          Thought Content: Thought content normal          Judgment: Judgment normal        ======================================================   I have personally reviewed pertinent reports  Portions of the record may have been created with voice recognition software  Occasional wrong word or "sound a like" substitutions may have occurred due to the inherent limitations of voice recognition software   Read the chart carefully and recognize, using context, where substitutions have occurred  Addendum 2021     Nuclear stress test:  [address] [facility] [address]  [place], [address]  [phone]     Rest/Stress SPECT Myocardial Perfusion Imaging After Regadenoson     Patient: Lesia Bowers  MR number: [id number]  Account number: [de-identified]  : 1950  Age: 73 years  Gender: Female  Status: Outpatient  Location: Stress lab  Height: 65 in  Weight: 123 lb  BP: 122/ 70 mmHg     Allergies: BEE VENOM, GABAPENTIN, WASP VENOM PROTEIN, AMBROSIA ARTEMISIIFOLIA (RAGWEED) SKIN TEST, DOG EPITHELIUM ALLERGY SKIN TEST, TRICHOPHYTON, CAT HAIR EXTRACT, CODEINE, PENICILLINS, SOY ISOFLAVONES     Diagnosis: Z01 810 - Encounter for preprocedural cardiovascular examination     Primary Physician:  Juanita Benitez MD  Technician:  César Medina  RN:  Adams Lopez RN BSN  Referring Physician:  Vasu Duenas MD  Group:  [facility]  Report Prepared By[de-identified]  Adams Lopez RN BSN  Interpreting Physician:  RYAN Marti      INDICATIONS: Detection of Coronary artery disease  Pre-operative risk assessment      HISTORY: The patient is a 73year old  female  Chest pain status: no chest pain  Coronary artery disease risk factors: dyslipidemia, former smoking, family history of premature coronary artery disease, and post-menopausal state  Cardiovascular history: none significant  Co-morbidity: history of lung disease  Medications: no cardiac drugs      PHYSICAL EXAM: Baseline physical exam screening: normal lung exam      REST ECG: Normal sinus rhythm with poor anterior R wave progression      PROCEDURE: The study was performed in the the Stress lab  A regadenoson infusion pharmacologic stress test was performed  SPECT myocardial perfusion imaging was performed during stress and at rest  Systolic blood pressure was 122 mmHg, at  the start of the study   Diastolic blood pressure was 70 mmHg, at the start of the study  The heart rate was 75 bpm, at the start of the study  IV double checked  Regadenoson protocol:  HR bpm SBP mmHg DBP mmHg Symptoms  Baseline 75 122 70 none  Immediate 113 130 74 dizziness  3 min 105 124 68 subsiding  5 min 100 120 74 none  No medications or fluids given  The patient also performed low level exercise      STRESS SUMMARY: Duration of pharmacologic stress was 3 min  Maximal heart rate during stress was 113 bpm  The rate-pressure product for the peak heart rate and blood pressure was 13616  There was no chest pain during stress  The stress  test was terminated due to protocol completion  Pre oxygen saturation: 99 %  Peak oxygen saturation: 99 %  The stress ECG was equivocal for ischemia  There were nonspecific T-wave changes initially which resolved on their own  There were  no ST segment changes to suggest an ischemic response  Arrhythmia during stress: isolated premature ventricular beats      ISOTOPE ADMINISTRATION:  Resting isotope administration Stress isotope administration  Agent Tetrofosmin Tetrofosmin  Dose 10 3 mCi 30 3 mCi  Injection-image interval 30 min 54 min     The radiopharmaceutical was injected at the peak effect of pharmacologic stress      MYOCARDIAL PERFUSION IMAGING:  The image quality was good  Rotating projection images reveal no abnormal extracardiac radiotracer uptake  Left ventricular size was normal  The TID ratio was 0 96      PERFUSION DEFECTS:  -  There were no perfusion defects      GATED SPECT:  The calculated left ventricular ejection fraction was 72 %  Left ventricular ejection fraction was within normal limits by visual estimate  There was no left ventricular regional abnormality      SUMMARY:  -  Stress results: Duration of pharmacologic stress was 3 min  There was no chest pain during stress  -  ECG conclusions: The stress ECG was equivocal for ischemia  There were nonspecific T-wave changes initially which resolved on their own   There were no ST segment changes to suggest an ischemic response  -  Perfusion imaging: There were no perfusion defects   -  Gated SPECT: The calculated left ventricular ejection fraction was 72 %  Left ventricular ejection fraction was within normal limits by visual estimate  There was no left ventricular regional abnormality      IMPRESSIONS: Normal study after pharmacologic vasodilation  Myocardial perfusion imaging was normal at rest and with stress  Left ventricular systolic function was normal      Prepared and signed by  RYAN Bagley  Signed 2021 14:18:41      Echocardiogram:  [address] [facility] [address]  [place], [address]  [phone]     Transthoracic Echocardiogram  2D, M-mode, Doppler, and Color Doppler     Study date:  03-Sep-2021     Patient: Bert Diaz  MR number: [id number]  Account number: [de-identified]  : 1950  Age: 73 years  Gender: Female  Status: Outpatient  Location: [facility] 3  Height: 65 in  Weight: 123 lb  BP: 128/ 80 mmHg     Indications: Chest Pain     Diagnoses: R07 9 - Chest pain, unspecified     Sonographer:  Ryan Galvez RDCS  Primary Physician:  Baron Canales MD  Referring Physician:  Willian Solomon MD  Group:  [facility]  Interpreting Physician:  Cathryne Collet, DO     SUMMARY     SUMMARY:  1  This is a technically adequate study  2  Left ventricle is normal in size and function  Left ventricular ejection fraction is estimated at 55%  3  Grade 1 diastolic dysfunction  4  Moderate hypokinesis of the inferobasilar wall  5  Mild right atrial dilatation  6  Mild-to-moderate aortic regurgitation  7  Trace mitral regurgitation  8  Mild-to-moderate tricuspid regurgitation with pulmonary artery systolic pressure is estimated at 25-30 mm Hg  9  There is no study for comparison     SUMMARY MEASUREMENTS  2D measurements:  Unspecified Anatomy:   %FS was 35 6 %  Ao Diam was 2 9 cm  Ao asc was 3 1 cm   EDV(Teich) was 86 1 ml  EF(Teich) was 65 3 %  ESV(Teich) was 29 9 ml  IVSd was 0 7 cm  LA Diam was 3 2 cm  LVEDV MOD A4C was 33 5 ml  LVEF MOD A4C was  38 6 %  LVESV MOD A4C was 20 6 ml  LVIDd was 4 4 cm  LVIDs was 2 8 cm  LVLd A4C was 5 2 cm  LVLs A4C was 5 4 cm  LVPWd was 0 8 cm  RAAs A4C was 16 9 cm2  RAESV A-L was 50 8 ml  RAESV MOD was 49 5 ml  RALs was 4 8 cm  RVIDd was  2 9 cm  RWT was 0 4   SV MOD A4C was 12 9 ml   SV(Teich) was 56 2 ml   CW measurements:  Unspecified Anatomy:   AR Dec [place] was 2 8 m/s2  AR Dec Time was 1521 1 ms  AR PHT was 441 1 ms  AR Vmax was 4 3 m/s  AR maxPG was 73 mmHg  AV Env  Ti was 285 4 ms   AV VTI was 19 3 cm  AV Vmax was 1 m/s  AV Vmean was 0 7 m/s  AV  maxPG was 4 mmHg  AV meanPG was 2 1 mmHg  TR Vmax was 2 5 m/s   TR maxPG was 24 5 mmHg  MM measurements:  Unspecified Anatomy:   TAPSE was 2 2 cm  PW measurements:  Unspecified Anatomy:   DVI was 0 9   E' Avg was 0 1 m/s  E' Lat was 0 1 m/s  E' Sept was 0 1 m/s  E/E' Avg was 3 7   E/E' Lat was 3 6   E/E' Sept was 3 9   LVOT Env  Ti was 277 8 ms  LVOT VTI was 17 2 cm  LVOT Vmax was 1 m/s  LVOT  Vmean was 0 6 m/s  LVOT maxPG was 4 mmHg  LVOT meanPG was 1 8 mmHg  MV A Cory was 0 7 m/s  MV Dec [place] was 1 m/s2  MV DecT was 333 2 ms   MV E Cory was 0 3 m/s  MV E/A Ratio was 0 5   MV PHT was 96 6 ms  MVA By PHT was 2 3 cm2      HISTORY: PRIOR HISTORY: Hyperlipidemia, Depression, Breast Cancer     PROCEDURE: The study was performed in the [facility] 3  This was a routine study  The transthoracic approach was used  The study included complete 2D imaging, M-mode, complete spectral Doppler, and color Doppler  The heart rate was 71 bpm, at  the start of the study  Images were obtained from the parasternal, apical, subcostal, and suprasternal notch acoustic windows  Image quality was adequate      LEFT VENTRICLE: Left ventricle is normal in size and function  Left ventricular ejection fraction is estimated at 55%   Normal wall thickness  Grade 1 diastolic dysfunction  Moderate hypokinesis of the inferobasilar wall      RIGHT VENTRICLE: Right ventricle is normal in size and function      LEFT ATRIUM: Left atrium is normal in size      ATRIAL SEPTUM: The interatrial septum appears to be grossly normal without evidence of shunting by color-flow Doppler      RIGHT ATRIUM: Right atrium is mildly dilated     MITRAL VALVE: Mitral valve opens well  No evidence of mitral stenosis  Trace mitral regurgitation      AORTIC VALVE: Aortic valve opens well  Aortic valve is trileaflet  No evidence aortic stenosis  Mild-to-moderate aortic regurgitation     TRICUSPID VALVE: Tricuspid valve opens well  Mild-to-moderate tricuspid regurgitation  Pulmonary artery systolic pressures are estimated at 25-30 mmHg     PULMONIC VALVE: Pulmonic valve opens well  No pulmonic stenosis or pulmonic regurgitation      PERICARDIUM: There is no evidence of significant pericardial effusion      AORTA: Aortic root is normal in size  Ascending aorta is normal size      SYSTEMIC VEINS: IVC: IVC is normal size with greater than 50% respirophasic collapse      SYSTEM MEASUREMENT TABLES     2D  %FS: 35 6 %  Ao Diam: 2 9 cm  Ao asc: 3 1 cm  EDV(Teich): 86 1 ml  EF(Teich): 65 3 %  ESV(Teich): 29 9 ml  IVSd: 0 7 cm  LA Diam: 3 2 cm  LVEDV MOD A4C: 33 5 ml  LVEF MOD A4C: 38 6 %  LVESV MOD A4C: 20 6 ml  LVIDd: 4 4 cm  LVIDs: 2 8 cm  LVLd A4C: 5 2 cm  LVLs A4C: 5 4 cm  LVPWd: 0 8 cm  RAAs A4C: 16 9 cm2  RAESV A-L: 50 8 ml  RAESV MOD: 49 5 ml  RALs: 4 8 cm  RVIDd: 2 9 cm  RWT: 0 4  SV MOD A4C: 12 9 ml  SV(Teich): 56 2 ml     CW  AR Dec [place]: 2 8 m/s2  AR Dec Time: 1521 1 ms  AR PHT: 441 1 ms  AR Vmax: 4 3 m/s  AR maxP mmHg  AV Env  Ti: 285 4 ms  AV VTI: 19 3 cm  AV Vmax: 1 m/s  AV Vmean: 0 7 m/s  AV maxP mmHg  AV meanP 1 mmHg  TR Vmax: 2 5 m/s  TR maxP 5 mmHg     MM  TAPSE: 2 2 cm     PW  DVI: 0 9  E' Av 1 m/s  E' Lat: 0 1 m/s  E' Sept: 0 1 m/s  E/E' Avg: 3 7  E/E' Lat: 3 6  E/E' Sept: 3 9  LVOT Env  Ti: 277 8 ms  LVOT VTI: 17 2 cm  LVOT Vmax: 1 m/s  LVOT Vmean: 0 6 m/s  LVOT maxP mmHg  LVOT meanP 8 mmHg  MV A Cory: 0 7 m/s  MV Dec [place]: 1 m/s2  MV DecT: 333 2 ms  MV E Cory: 0 3 m/s  MV E/A Ratio: 0 5  MV PHT: 96 6 ms  MVA By PHT: 2 3 cm2     [employer] Accredited Echocardiography Laboratory     Prepared and electronically signed by  Angie Betancourt DO  Signed 03-Sep-2021 12:40:04      SIGNATURES:   Emerson Hart MD

## 2021-08-09 NOTE — LETTER
2021     Latrell Bruno, [address]    Patient: Nadege Perez   YOB: 1950   Date of Visit: 2021       Dear Dr Ginger Mei: Thank you for referring Nadege Perez to me for evaluation  Below are my notes for this consultation  If you have questions, please do not hesitate to call me  I look forward to following your patient along with you  Sincerely,        Della Galindo MD        CC: MD Della Parikh MD  2021  3:42 PM  Sign when Signing Visit     [facility]  [address] [place] [address], [address]  Phone#  [phone]   Fax#  [phone]  *-*-*-*-*-*-*-*-*-*-*-*-*-*-*-*-*-*-*-*-*-*-*-*-*-*-*-*-*-*-*-*-*-*-*-*-*-*-*-*-*-*-*-*-*-*-*-*-*-*-*-*-*-*                                              Cardiology Consultation       ENCOUNTER DATE: 21 3:13 PM  PATIENT NAME: Nadege Perez   : 1950    MRN: [id number]  AGE:71 y o  SEX: female  [address] Kaya Mckeon MD     PRIMARY CARE PHYSICIAN: Latrell Bruno MD    ACTIVE DIAGNOSIS THIS VISIT  1  Preoperative cardiovascular examination  POCT ECG   2  Symptomatic varicose veins of both lower extremities     3  Mixed hyperlipidemia     4  Abnormal EKG  Ambulatory referral to Cardiology   5   Preoperative clearance  Ambulatory referral to Cardiology     ACTIVE PROBLEM LIST  Patient Active Problem List   Diagnosis    Recurrent major depressive disorder, in partial remission ([facility] [address] )    Hyperlipidemia    Pleural thickening    Symptomatic varicose veins of both lower extremities    Vitamin D deficiency    Changing skin lesion    Asthma    Other acute recurrent sinusitis    Dermatitis    Superficial phlebitis of leg, right    Medicare annual wellness visit, subsequent    Left knee injury    Left shoulder pain    Acute upper respiratory infection    Cerumen impaction    Depression    Right foot pain    RLQ abdominal tenderness    Bug bite    Protein-calorie malnutrition ([facility] [address] )    Ricci's neuroma of right foot    Chronic pain of right ankle    Right knee injury    Abnormal EKG    Preoperative cardiovascular examination       HPI:    Patient is here for symptomatic varicose vein ablation of both lower extremities  She has no cardiac history  However, she complains of discomfort in her left shoulder which she relates to gardening  She also complains of substernal discomfort which she relates to eating hot peppers  She has a brother who  in his early 62s in his sleep but had been complaining of left shoulder pain which she attributes to him chopping wood  She is a former smoker quitting in   Her brother quit 2 years prior to his episode of sudden death in his sleep  Her father also had heart disease  Patient does not feel she can walk on a treadmill  She has a Ricci's neuroma which she states would be extremely painful for her to walk on a treadmill  Patient's EKG today is normal except for some PVCs  Her previous EKG was read abnormal for low voltage an ectopic atrial rhythm  Neither of these diagnoses are of significance for a surgical procedure or for coronary artery disease  I am most concerned about her history  Rather than her EKG  Her cholesterol is acceptable with an LDL of 102  DISCUSSION/PLAN:    1  Pharmacologic nuclear stress test  2  Echocardiogram  3   Will risk stratify her for surgical procedure following the stress test and echocardiogram    Lab Studies:    Lab Results   Component Value Date    CHOLESTEROL 207 (H) 2020    CHOLESTEROL 186 2019    CHOLESTEROL 186 10/14/2016     Lab Results   Component Value Date    TRIG 61 2020    TRIG 111 2019    TRIG 68 10/14/2016     Lab Results   Component Value Date    HDL 93 2020    HDL 77 2019     (H) 10/14/2016     Lab Results   Component Value Date    LDLCALC 102 (H) 2020    1811 Numerate 88 2019    1811 Numerate 71 10/14/2016     Lab Results   Component Value Date    NONHDL 109 04/13/2019       Lab Results   Component Value Date    EGFR 90 06/22/2021    EGFR 89 05/10/2021    EGFR 88 08/07/2020    SODIUM 140 06/22/2021    SODIUM 137 05/10/2021    SODIUM 138 08/07/2020    K 4 2 06/22/2021    K 4 4 05/10/2021    K 4 6 08/07/2020     06/22/2021     05/10/2021     08/07/2020    CO2 27 06/22/2021    CO2 29 05/10/2021    CO2 27 08/07/2020    BUN 11 06/22/2021    BUN 12 05/10/2021    BUN 13 08/07/2020    CREATININE 0 66 06/22/2021    CREATININE 0 69 05/10/2021    CREATININE 0 71 08/07/2020     Lab Results   Component Value Date    WBC 6 27 05/10/2021    WBC 6 73 08/07/2020    WBC 7 8 04/13/2019    HGB 14 4 05/10/2021    HGB 14 3 08/07/2020    HGB 14 9 04/13/2019    HCT 44 7 05/10/2021    HCT 43 9 08/07/2020    HCT 45 1 (H) 04/13/2019    MCV 91 05/10/2021    MCV 92 08/07/2020    MCV 90 7 04/13/2019    MCH 29 4 05/10/2021    MCH 29 9 08/07/2020    MCH 30 0 04/13/2019    MCHC 32 2 05/10/2021    MCHC 32 6 08/07/2020    MCHC 33 0 04/13/2019     05/10/2021     08/07/2020     04/13/2019        Lab Results   Component Value Date    CALCIUM 8 9 06/22/2021    CALCIUM 8 6 05/10/2021    CALCIUM 9 0 08/07/2020    AST 19 05/10/2021    AST 20 08/07/2020    AST 17 04/13/2019    ALT 24 05/10/2021    ALT 23 08/07/2020    ALT 12 04/13/2019    ALKPHOS 74 05/10/2021    ALKPHOS 78 08/07/2020    ALKPHOS 71 04/13/2019    PROT 6 7 08/11/2017    BILITOT 0 4 08/11/2017       Results for orders placed or performed in visit on 08/09/21   POCT ECG    Narrative     Normal sinus rhythm at a rate 87 beats per minute  Occasional premature ventricular contractions  Otherwise normal EKG           Current Outpatient Medications:     albuterol (PROVENTIL HFA,VENTOLIN HFA) 90 mcg/act inhaler, Inhale 1 puff as needed for wheezing, Disp: 8 5 g, Rfl: 2    ketotifen (ZADITOR) 0 025 % ophthalmic solution, Administer 1 drop to both eyes 2 (two) times a day as needed (eye itching or discomfort), Disp: 5 mL, Rfl: 0    acetaminophen (TYLENOL) 325 mg tablet, Take 650 mg by mouth every 6 (six) hours as needed for mild pain (Patient not taking: Reported on 8/9/2021), Disp: , Rfl:     COD LIVER OIL PO, Take by mouth daily  (Patient not taking: Reported on 7/7/2021), Disp: , Rfl:     EPINEPHrine (EPIPEN) 0 3 mg/0 3 mL SOAJ, Inject 0 3 mL (0 3 mg total) into a muscle once for 1 dose, Disp: 0 6 mL, Rfl: 0    Multiple Vitamin (multivitamin) tablet, Take 1 tablet by mouth daily (Patient not taking: Reported on 7/7/2021), Disp: , Rfl:     Multiple Vitamins-Minerals (PRESERVISION AREDS PO), Take by mouth daily  (Patient not taking: Reported on 7/7/2021), Disp: , Rfl:     Vitamin D, Cholecalciferol, 25 MCG (1000 UT) TABS, Take by mouth daily (Patient not taking: Reported on 7/7/2021), Disp: , Rfl:     Current Facility-Administered Medications:     cyanocobalamin injection 1,000 mcg, 1,000 mcg, Intramuscular, Q30 Days, Evelyn Balderas MD, 1,000 mcg at 02/17/21 1014    cyanocobalamin injection 1,000 mcg, 1,000 mcg, Intramuscular, Q30 Days, Evelyn Balderas MD, 1,000 mcg at 11/20/20 1025    cyanocobalamin injection 1,000 mcg, 1,000 mcg, Intramuscular, Q30 Days, Evelyn Balderas MD, 1,000 mcg at 07/07/21 1210  Allergies   Allergen Reactions    Bee Venom Hives    Gabapentin Tinnitus     Side of head numb    Wasp Venom Protein Hives    Ambrosia Artemisiifolia (Ragweed) Skin Test     Dog Epithelium Allergy Skin Test     Mold Extract  [Trichophyton]     Cat Hair Extract Allergic Rhinitis    Codeine Rash     As child, tolerated as adult      Penicillins Rash    Soy Isoflavones Rash       Past Medical History:   Diagnosis Date    Allergic 1999    penicillin,ragweed,cat,dog,mold,grass,moderate soy    Anxiety     Breast cancer ([facility] [address] ) 2003    left breast phyllodes    Cancer [facility])      Social History     Socioeconomic History    Marital status: Single     Spouse name: Not on file    Number of children: Not on file    Years of education: Not on file    Highest education level: Some college, no degree   Occupational History    Occupation: retired   [employer]   Tobacco Use    Smoking status: Former Smoker     Packs/day: 0 25     Years: 20 00     Pack years: 5 00     Quit date:      Years since quittin 6    Smokeless tobacco: Never Used   Vaping Use    Vaping Use: Never used   Substance and Sexual Activity    Alcohol use: Not Currently     Alcohol/week: 1 0 standard drinks     Types: 1 Cans of beer per week     Comment: occasional    Drug use: No    Sexual activity: Not Currently   Other Topics Concern    Not on file   Social History Narrative    Always uses seatbelt        Caffeine use consumes 2 cups of coffee per day         Social Determinants of Health     Financial Resource Strain: Low Risk     Difficulty of Paying Living Expenses: Not hard at all   Food Insecurity: No Food Insecurity    Worried About Running Out of Food in the Last Year: Never true    Nathaniel of Food in the Last Year: Never true   Transportation Needs: No Transportation Needs    Lack of Transportation (Medical): No    Lack of Transportation (Non-Medical): No   Physical Activity: Sufficiently Active    Days of Exercise per Week: 7 days    Minutes of Exercise per Session: 30 min   Stress: No Stress Concern Present    Feeling of Stress : Only a little   Social Connections: Moderately Isolated    Frequency of Communication with Friends and Family: More than three times a week    Frequency of Social Gatherings with Friends and Family:  Three times a week    Attends [employer] Services: More than 4 times per year    Active Member of Clubs or Organizations: No    Attends Club or Organization Meetings: Never    Marital Status:    Intimate Partner Violence: Not At Risk    Fear of Current or Ex-Partner: No    Emotionally Abused: No    Physically Abused: No    Sexually Abused: No      Family History   Problem Relation Age of Onset    Uterine cancer Mother     Varicose Veins Mother     Hypertension Mother     Dysfunctional uterine bleeding Mother     Endometrial cancer Mother 80    COPD Father     Heart disease Father         Cardiac disorder    Heart attack Brother     Diabetes Family     COPD Sister     Lung cancer Sister 72    No Known Problems Maternal Grandmother     No Known Problems Maternal Grandfather     No Known Problems Paternal Grandmother     No Known Problems Paternal Grandfather      Past Surgical History:   Procedure Laterality Date    BREAST BIOPSY Right 2005    benign    BREAST LUMPECTOMY Left 2003    phyllodes    COLONOSCOPY      TONSILLECTOMY      WISDOM TOOTH EXTRACTION         Review of Systems:  Review of Systems   Constitutional: Negative  HENT: Negative  Respiratory: Negative for chest tightness and shortness of breath  Cardiovascular: Positive for chest pain  Negative for leg swelling  Left shoulder pain   Gastrointestinal: Negative  Endocrine: Negative  Genitourinary: Negative  Musculoskeletal: Negative  Skin: Negative  Allergic/Immunologic: Negative  Neurological: Negative  Hematological: Negative  Psychiatric/Behavioral: Negative  Physical Exam:  /80 (BP Location: Left arm, Patient Position: Sitting, Cuff Size: Adult)   Pulse 86   Wt 56 1 kg (123 lb 9 6 oz)   LMP  (LMP Unknown)   BMI 20 57 kg/m²     PREVIOUS WEIGHTS:   Wt Readings from Last 10 Encounters:   08/09/21 56 1 kg (123 lb 9 6 oz)   07/07/21 56 2 kg (123 lb 12 8 oz)   07/07/21 55 8 kg (123 lb)   05/21/21 55 3 kg (122 lb)   05/19/21 55 4 kg (122 lb 3 2 oz)   04/14/21 58 kg (127 lb 12 8 oz)   03/22/21 56 7 kg (125 lb)   03/11/21 57 2 kg (126 lb)   03/03/21 57 6 kg (127 lb)   02/17/21 56 7 kg (125 lb)      Physical Exam  Constitutional:       Appearance: She is well-developed     HENT: Head: Normocephalic and atraumatic  Neck:      Thyroid: No thyromegaly  Vascular: No carotid bruit or JVD  Trachea: No tracheal deviation  Cardiovascular:      Rate and Rhythm: Normal rate and regular rhythm  Heart sounds: Normal heart sounds  No murmur heard  No friction rub  No gallop  Pulmonary:      Effort: Pulmonary effort is normal  No respiratory distress  Breath sounds: Normal breath sounds  No wheezing, rhonchi or rales  Abdominal:      General: Bowel sounds are normal       Palpations: Abdomen is soft  Musculoskeletal:         General: Normal range of motion  Cervical back: Normal range of motion and neck supple  Right lower leg: No edema  Left lower leg: No edema  Skin:     General: Skin is warm and dry  Neurological:      General: No focal deficit present  Mental Status: She is alert and oriented to person, place, and time  Psychiatric:         Mood and Affect: Mood normal          Behavior: Behavior normal          Thought Content: Thought content normal          Judgment: Judgment normal        ======================================================   I have personally reviewed pertinent reports  Portions of the record may have been created with voice recognition software  Occasional wrong word or "sound a like" substitutions may have occurred due to the inherent limitations of voice recognition software  Read the chart carefully and recognize, using context, where substitutions have occurred      SIGNATURES:   Negar Bansal MD

## 2021-08-09 NOTE — LETTER
2021     Grace Ganser, MD  [facility]    Patient: Tabatha Salinas   YOB: 1950   Date of Visit: 2021       Dear Dr Marcie Burden:    Thank you for referring Tabatha Salinas to me for evaluation  Below are my notes for this consultation  If you have questions, please do not hesitate to call me  I look forward to following your patient along with you  Sincerely,        Elsie Melendez MD        CC: MD Elsie Garcia MD  2021 10:13 AM  [facility]     [facility]  [address]  [facility], [facility] [address] [place] [address]  Phone#  [phone]   Fax#  [phone]  *-*-*-*-*-*-*-*-*-*-*-*-*-*-*-*-*-*-*-*-*-*-*-*-*-*-*-*-*-*-*-*-*-*-*-*-*-*-*-*-*-*-*-*-*-*-*-*-*-*-*-*-*-*                                              Cardiology Consultation     Please see addendum added 2021    ENCOUNTER DATE: 21 3:13 PM  PATIENT NAME: Tabatha Salinas   : 1950    MRN: [id number]  AGE:71 y o  SEX: female  [address] Kaya Mckeon MD     PRIMARY CARE PHYSICIAN: Daisy Painting MD    ACTIVE DIAGNOSIS THIS VISIT  1  Preoperative cardiovascular examination  POCT ECG   2  Symptomatic varicose veins of both lower extremities     3  Mixed hyperlipidemia     4  Abnormal EKG  Ambulatory referral to Cardiology   5   Preoperative clearance  Ambulatory referral to Cardiology     ACTIVE PROBLEM LIST  Patient Active Problem List   Diagnosis    Recurrent major depressive disorder, in partial remission ([facility] [address] )    Hyperlipidemia    Pleural thickening    Symptomatic varicose veins of both lower extremities    Vitamin D deficiency    Changing skin lesion    Asthma    Other acute recurrent sinusitis    Dermatitis    Superficial phlebitis of leg, right    Medicare annual wellness visit, subsequent    Left knee injury    Left shoulder pain    Acute upper respiratory infection    Cerumen impaction    Depression    Right foot pain    RLQ abdominal tenderness    Bug bite    Protein-calorie malnutrition ([facility] [address] )    Ricci's neuroma of right foot    Chronic pain of right ankle    Right knee injury    Abnormal EKG    Preoperative cardiovascular examination       HPI:    Patient is here for symptomatic varicose vein ablation of both lower extremities  She has no cardiac history  However, she complains of discomfort in her left shoulder which she relates to gardening  She also complains of substernal discomfort which she relates to eating hot peppers  She has a brother who  in his early 62s in his sleep but had been complaining of left shoulder pain which she attributes to him chopping wood  She is a former smoker quitting in   Her brother quit 2 years prior to his episode of sudden death in his sleep  Her father also had heart disease  Patient does not feel she can walk on a treadmill  She has a Ricci's neuroma which she states would be extremely painful for her to walk on a treadmill  Patient's EKG today is normal except for some PVCs  Her previous EKG was read abnormal for low voltage an ectopic atrial rhythm  Neither of these diagnoses are of significance for a surgical procedure or for coronary artery disease  I am most concerned about her history  Rather than her EKG  Her cholesterol is acceptable with an LDL of 102  DISCUSSION/PLAN:    1  Pharmacologic nuclear stress test  2  Echocardiogram  3  Will risk stratify her for surgical procedure following the stress test and echocardiogram     ADDENDUM 2021   1  Echocardiogram:  Left ventricle is normal in size and function with an LVEF of 55%  Grade 1 diastolic dysfunction  Report states moderate inferior basilar wall hypokinesis although I have reviewed the echocardiogram and am not convinced of this finding  In addition, nuclear stress test does not support this finding    Mild-to-moderate aortic regurgitation and mild-to-moderate tricuspid regurgitation with normal pulmonary artery pressures  2  Nuclear stress test:  Normal left ventricular ejection fraction, 72%  No regional wall motion abnormalities identified  Normal tomographic perfusion scan without perfusion defects  No evidence of prior myocardial infarction and no evidence of pharmacologic induced ischemia  Summary:  1  Recommend proceeding with surgery as planned  2  Patient cardiovascular risk graded to be mild to moderate   3   Echocardiogram and stress nuclear stress test reports are in their entirety at the end of this note      Lab Studies:    Lab Results   Component Value Date    CHOLESTEROL 207 (H) 08/07/2020    CHOLESTEROL 186 04/13/2019    CHOLESTEROL 186 10/14/2016     Lab Results   Component Value Date    TRIG 61 08/07/2020    TRIG 111 04/13/2019    TRIG 68 10/14/2016     Lab Results   Component Value Date    HDL 93 08/07/2020    HDL 77 04/13/2019     (H) 10/14/2016     Lab Results   Component Value Date    LDLCALC 102 (H) 08/07/2020    LDLCALC 88 04/13/2019    LDLCALC 71 10/14/2016     Lab Results   Component Value Date    NONHDL 109 04/13/2019       Lab Results   Component Value Date    EGFR 90 06/22/2021    EGFR 89 05/10/2021    EGFR 88 08/07/2020    SODIUM 140 06/22/2021    SODIUM 137 05/10/2021    SODIUM 138 08/07/2020    K 4 2 06/22/2021    K 4 4 05/10/2021    K 4 6 08/07/2020     06/22/2021     05/10/2021     08/07/2020    CO2 27 06/22/2021    CO2 29 05/10/2021    CO2 27 08/07/2020    BUN 11 06/22/2021    BUN 12 05/10/2021    BUN 13 08/07/2020    CREATININE 0 66 06/22/2021    CREATININE 0 69 05/10/2021    CREATININE 0 71 08/07/2020     Lab Results   Component Value Date    WBC 6 27 05/10/2021    WBC 6 73 08/07/2020    WBC 7 8 04/13/2019    HGB 14 4 05/10/2021    HGB 14 3 08/07/2020    HGB 14 9 04/13/2019    HCT 44 7 05/10/2021    HCT 43 9 08/07/2020    HCT 45 1 (H) 04/13/2019    MCV 91 05/10/2021    MCV 92 08/07/2020    MCV 90 7 04/13/2019    MCH 29 4 05/10/2021    MCH 29 9 08/07/2020    MCH 30 0 04/13/2019    MCHC 32 2 05/10/2021    MCHC 32 6 08/07/2020    MCHC 33 0 04/13/2019     05/10/2021     08/07/2020     04/13/2019        Lab Results   Component Value Date    CALCIUM 8 9 06/22/2021    CALCIUM 8 6 05/10/2021    CALCIUM 9 0 08/07/2020    AST 19 05/10/2021    AST 20 08/07/2020    AST 17 04/13/2019    ALT 24 05/10/2021    ALT 23 08/07/2020    ALT 12 04/13/2019    ALKPHOS 74 05/10/2021    ALKPHOS 78 08/07/2020    ALKPHOS 71 04/13/2019    PROT 6 7 08/11/2017    BILITOT 0 4 08/11/2017       Results for orders placed or performed in visit on 08/09/21   POCT ECG    Narrative     Normal sinus rhythm at a rate 87 beats per minute  Occasional premature ventricular contractions  Otherwise normal EKG           Current Outpatient Medications:     albuterol (PROVENTIL HFA,VENTOLIN HFA) 90 mcg/act inhaler, Inhale 1 puff as needed for wheezing, Disp: 8 5 g, Rfl: 2    ketotifen (ZADITOR) 0 025 % ophthalmic solution, Administer 1 drop to both eyes 2 (two) times a day as needed (eye itching or discomfort), Disp: 5 mL, Rfl: 0    acetaminophen (TYLENOL) 325 mg tablet, Take 650 mg by mouth every 6 (six) hours as needed for mild pain (Patient not taking: Reported on 8/9/2021), Disp: , Rfl:     COD LIVER OIL PO, Take by mouth daily  (Patient not taking: Reported on 7/7/2021), Disp: , Rfl:     EPINEPHrine (EPIPEN) 0 3 mg/0 3 mL SOAJ, Inject 0 3 mL (0 3 mg total) into a muscle once for 1 dose, Disp: 0 6 mL, Rfl: 0    Multiple Vitamin (multivitamin) tablet, Take 1 tablet by mouth daily (Patient not taking: Reported on 7/7/2021), Disp: , Rfl:     Multiple Vitamins-Minerals (PRESERVISION AREDS PO), Take by mouth daily  (Patient not taking: Reported on 7/7/2021), Disp: , Rfl:     Vitamin D, Cholecalciferol, 25 MCG (1000 UT) TABS, Take by mouth daily (Patient not taking: Reported on 7/7/2021), Disp: , Rfl:     Current Facility-Administered Medications:    cyanocobalamin injection 1,000 mcg, 1,000 mcg, Intramuscular, Q30 Days, Alen Mora MD, 1,000 mcg at 21 1014    cyanocobalamin injection 1,000 mcg, 1,000 mcg, Intramuscular, Q30 Days, Alen Mora MD, 1,000 mcg at 20 1025    cyanocobalamin injection 1,000 mcg, 1,000 mcg, Intramuscular, Q30 Days, Alen Mora MD, 1,000 mcg at 21 1210  Allergies   Allergen Reactions    Bee Venom Hives    Gabapentin Tinnitus     Side of head numb    Wasp Venom Protein Hives    Ambrosia Artemisiifolia (Ragweed) Skin Test     Dog Epithelium Allergy Skin Test     Mold Extract  [Trichophyton]     Cat Hair Extract Allergic Rhinitis    Codeine Rash     As child, tolerated as adult      Penicillins Rash    Soy Isoflavones Rash       Past Medical History:   Diagnosis Date    Allergic     penicillin,ragweed,cat,dog,mold,grass,moderate soy    Anxiety     Breast cancer ([facility] [address] )     left breast phyllodes    Cancer [facility])      Social History     Socioeconomic History    Marital status: Single     Spouse name: Not on file    Number of children: Not on file    Years of education: Not on file    Highest education level: Some college, no degree   Occupational History    Occupation: retired   [employer] service   Tobacco Use    Smoking status: Former Smoker     Packs/day: 0 25     Years: 20 00     Pack years: 5 00     Quit date:      Years since quittin 6    Smokeless tobacco: Never Used   Vaping Use    Vaping Use: Never used   Substance and Sexual Activity    Alcohol use: Not Currently     Alcohol/week: 1 0 standard drinks     Types: 1 Cans of beer per week     Comment: occasional    Drug use: No    Sexual activity: Not Currently   Other Topics Concern    Not on file   Social History Narrative    Always uses seatbelt        Caffeine use consumes 2 cups of coffee per day         Social Determinants of Health     Financial Resource Strain: Low Risk     Difficulty of Paying Living Expenses: Not hard at all   Food Insecurity: No Food Insecurity    Worried About [address] in the Last Year: Never true    Nathaniel of Food in the Last Year: Never true   Transportation Needs: No Transportation Needs    Lack of Transportation (Medical): No    Lack of Transportation (Non-Medical): No   Physical Activity: Sufficiently Active    Days of Exercise per Week: 7 days    Minutes of Exercise per Session: 30 min   Stress: No Stress Concern Present    Feeling of Stress : Only a little   Social Connections: Moderately Isolated    Frequency of Communication with Friends and Family: More than three times a week    Frequency of Social Gatherings with Friends and Family: Three times a week    Attends Mormon Services: More than 4 times per year    Active Member of Clubs or Organizations: No    Attends Club or Organization Meetings: Never    Marital Status:    Intimate Partner Violence: Not At Risk    Fear of Current or Ex-Partner: No    Emotionally Abused: No    Physically Abused: No    Sexually Abused: No      Family History   Problem Relation Age of Onset    Uterine cancer Mother     Varicose Veins Mother     Hypertension Mother     Dysfunctional uterine bleeding Mother     Endometrial cancer Mother 80    COPD Father     Heart disease Father         Cardiac disorder    Heart attack Brother     Diabetes Family     COPD Sister     Lung cancer Sister 72    No Known Problems Maternal Grandmother     No Known Problems Maternal Grandfather     No Known Problems Paternal Grandmother     No Known Problems Paternal Grandfather      Past Surgical History:   Procedure Laterality Date    BREAST BIOPSY Right 2005    benign    BREAST LUMPECTOMY Left 2003    phyllodes    COLONOSCOPY      TONSILLECTOMY      WISDOM TOOTH EXTRACTION         Review of Systems:  Review of Systems   Constitutional: Negative  HENT: Negative      Respiratory: Negative for chest tightness and shortness of breath  Cardiovascular: Positive for chest pain  Negative for leg swelling  Left shoulder pain   Gastrointestinal: Negative  Endocrine: Negative  Genitourinary: Negative  Musculoskeletal: Negative  Skin: Negative  Allergic/Immunologic: Negative  Neurological: Negative  Hematological: Negative  Psychiatric/Behavioral: Negative  Physical Exam:  /80 (BP Location: Left arm, Patient Position: Sitting, Cuff Size: Adult)   Pulse 86   Wt 56 1 kg (123 lb 9 6 oz)   LMP  (LMP Unknown)   BMI 20 57 kg/m²     PREVIOUS WEIGHTS:   Wt Readings from Last 10 Encounters:   08/09/21 56 1 kg (123 lb 9 6 oz)   07/07/21 56 2 kg (123 lb 12 8 oz)   07/07/21 55 8 kg (123 lb)   05/21/21 55 3 kg (122 lb)   05/19/21 55 4 kg (122 lb 3 2 oz)   04/14/21 58 kg (127 lb 12 8 oz)   03/22/21 56 7 kg (125 lb)   03/11/21 57 2 kg (126 lb)   03/03/21 57 6 kg (127 lb)   02/17/21 56 7 kg (125 lb)      Physical Exam  Constitutional:       Appearance: She is well-developed  HENT:      Head: Normocephalic and atraumatic  Neck:      Thyroid: No thyromegaly  Vascular: No carotid bruit or JVD  Trachea: No tracheal deviation  Cardiovascular:      Rate and Rhythm: Normal rate and regular rhythm  Heart sounds: Normal heart sounds  No murmur heard  No friction rub  No gallop  Pulmonary:      Effort: Pulmonary effort is normal  No respiratory distress  Breath sounds: Normal breath sounds  No wheezing, rhonchi or rales  Abdominal:      General: Bowel sounds are normal       Palpations: Abdomen is soft  Musculoskeletal:         General: Normal range of motion  Cervical back: Normal range of motion and neck supple  Right lower leg: No edema  Left lower leg: No edema  Skin:     General: Skin is warm and dry  Neurological:      General: No focal deficit present  Mental Status: She is alert and oriented to person, place, and time     Psychiatric: Mood and Affect: Mood normal          Behavior: Behavior normal          Thought Content: Thought content normal          Judgment: Judgment normal        ======================================================   I have personally reviewed pertinent reports  Portions of the record may have been created with voice recognition software  Occasional wrong word or "sound a like" substitutions may have occurred due to the inherent limitations of voice recognition software  Read the chart carefully and recognize, using context, where substitutions have occurred  Addendum 2021     Nuclear stress test:  [address], [address]  [phone]     Rest/Stress SPECT Myocardial Perfusion Imaging After Regadenoson     Patient: Jyothi Douglas  MR number: [id number]  Account number: [de-identified]  : 1950  Age: 73 years  Gender: Female  Status: Outpatient  Location: Stress lab  Height: 65 in  Weight: 123 lb  BP: 122/ 70 mmHg     Allergies: BEE VENOM, GABAPENTIN, WASP VENOM PROTEIN, AMBROSIA ARTEMISIIFOLIA (RAGWEED) SKIN TEST, DOG EPITHELIUM ALLERGY SKIN TEST, TRICHOPHYTON, CAT HAIR EXTRACT, CODEINE, PENICILLINS, SOY ISOFLAVONES     Diagnosis: Z01 810 - Encounter for preprocedural cardiovascular examination     Primary Physician:  Brigida Callahan MD  Technician:  Lyle Blas  RN:  Evangelist Torres RN BSN  Referring Physician:  Neil Kehr, MD  Group:  [facility]  Report Prepared By[de-identified]  Evangelist Torres RN BSN  Interpreting Physician:  RYAN Liang      INDICATIONS: Detection of Coronary artery disease  Pre-operative risk assessment      HISTORY: The patient is a 73year old  female  Chest pain status: no chest pain  Coronary artery disease risk factors: dyslipidemia, former smoking, family history of premature coronary artery disease, and post-menopausal state  Cardiovascular history: none significant  Co-morbidity: history of lung disease  Medications: no cardiac drugs      PHYSICAL EXAM: Baseline physical exam screening: normal lung exam      REST ECG: Normal sinus rhythm with poor anterior R wave progression      PROCEDURE: The study was performed in the the Stress lab  A regadenoson infusion pharmacologic stress test was performed  SPECT myocardial perfusion imaging was performed during stress and at rest  Systolic blood pressure was 122 mmHg, at  the start of the study  Diastolic blood pressure was 70 mmHg, at the start of the study  The heart rate was 75 bpm, at the start of the study  IV double checked  Regadenoson protocol:  HR bpm SBP mmHg DBP mmHg Symptoms  Baseline 75 122 70 none  Immediate 113 130 74 dizziness  3 min 105 124 68 subsiding  5 min 100 120 74 none  No medications or fluids given  The patient also performed low level exercise      STRESS SUMMARY: Duration of pharmacologic stress was 3 min  Maximal heart rate during stress was 113 bpm  The rate-pressure product for the peak heart rate and blood pressure was 84766  There was no chest pain during stress  The stress  test was terminated due to protocol completion  Pre oxygen saturation: 99 %  Peak oxygen saturation: 99 %  The stress ECG was equivocal for ischemia  There were nonspecific T-wave changes initially which resolved on their own  There were  no ST segment changes to suggest an ischemic response  Arrhythmia during stress: isolated premature ventricular beats      ISOTOPE ADMINISTRATION:  Resting isotope administration Stress isotope administration  Agent Tetrofosmin Tetrofosmin  Dose 10 3 mCi 30 3 mCi  Injection-image interval 30 min 54 min     The radiopharmaceutical was injected at the peak effect of pharmacologic stress      MYOCARDIAL PERFUSION IMAGING:  The image quality was good  Rotating projection images reveal no abnormal extracardiac radiotracer uptake   Left ventricular size was normal  The TID ratio was 0 96      PERFUSION DEFECTS:  -  There were no perfusion defects      GATED SPECT:  The calculated left ventricular ejection fraction was 72 %  Left ventricular ejection fraction was within normal limits by visual estimate  There was no left ventricular regional abnormality      SUMMARY:  -  Stress results: Duration of pharmacologic stress was 3 min  There was no chest pain during stress  -  ECG conclusions: The stress ECG was equivocal for ischemia  There were nonspecific T-wave changes initially which resolved on their own  There were no ST segment changes to suggest an ischemic response  -  Perfusion imaging: There were no perfusion defects   -  Gated SPECT: The calculated left ventricular ejection fraction was 72 %  Left ventricular ejection fraction was within normal limits by visual estimate  There was no left ventricular regional abnormality      IMPRESSIONS: Normal study after pharmacologic vasodilation  Myocardial perfusion imaging was normal at rest and with stress  Left ventricular systolic function was normal      Prepared and signed by  RYAN Emanuel 2021 14:18:41      Echocardiogram:  [address] [facility]  [place], [address]  [phone]     Transthoracic Echocardiogram  2D, M-mode, Doppler, and Color Doppler     Study date:  03-Sep-2021     Patient: Alka De La Vega  MR number: [id number]  Account number: [de-identified]  : 1950  Age: 73 years  Gender: Female  Status: Outpatient  Location: [facility] E[facility] 3  Height: 65 in  Weight: 123 lb  BP: 128/ 80 mmHg     Indications: Chest Pain     Diagnoses: R07 9 - Chest pain, unspecified     Sonographer:  Newton Dorantes RDCS  Primary Physician:  Elmer Valle MD  Referring Physician:  Augustus Coulter MD  Group:  [facility]  Interpreting Physician:  Jerome Le DO     SUMMARY     SUMMARY:  1  This is a technically adequate study  2  Left ventricle is normal in size and function   Left ventricular ejection fraction is estimated at 55%  3  Grade 1 diastolic dysfunction  4  Moderate hypokinesis of the inferobasilar wall  5  Mild right atrial dilatation  6  Mild-to-moderate aortic regurgitation  7  Trace mitral regurgitation  8  Mild-to-moderate tricuspid regurgitation with pulmonary artery systolic pressure is estimated at 25-30 mm Hg  9  There is no study for comparison     SUMMARY MEASUREMENTS  2D measurements:  Unspecified Anatomy:   %FS was 35 6 %  Ao Diam was 2 9 cm  Ao asc was 3 1 cm   EDV(Teich) was 86 1 ml   EF(Teich) was 65 3 %  ESV(Teich) was 29 9 ml  IVSd was 0 7 cm  LA Diam was 3 2 cm  LVEDV MOD A4C was 33 5 ml  LVEF MOD A4C was  38 6 %  LVESV MOD A4C was 20 6 ml  LVIDd was 4 4 cm  LVIDs was 2 8 cm  LVLd A4C was 5 2 cm  LVLs A4C was 5 4 cm  LVPWd was 0 8 cm  RAAs A4C was 16 9 cm2  RAESV A-L was 50 8 ml  RAESV MOD was 49 5 ml  RALs was 4 8 cm  RVIDd was  2 9 cm  RWT was 0 4   SV MOD A4C was 12 9 ml   SV(Teich) was 56 2 ml   CW measurements:  Unspecified Anatomy:   AR Dec Harnett was 2 8 m/s2  AR Dec Time was 1521 1 ms  AR PHT was 441 1 ms  AR Vmax was 4 3 m/s  AR maxPG was 73 mmHg  AV Env  Ti was 285 4 ms   AV VTI was 19 3 cm  AV Vmax was 1 m/s  AV Vmean was 0 7 m/s  AV  maxPG was 4 mmHg  AV meanPG was 2 1 mmHg  TR Vmax was 2 5 m/s   TR maxPG was 24 5 mmHg  MM measurements:  Unspecified Anatomy:   TAPSE was 2 2 cm  PW measurements:  Unspecified Anatomy:   DVI was 0 9   E' Avg was 0 1 m/s  E' Lat was 0 1 m/s  E' Sept was 0 1 m/s  E/E' Avg was 3 7   E/E' Lat was 3 6   E/E' Sept was 3 9   LVOT Env  Ti was 277 8 ms  LVOT VTI was 17 2 cm  LVOT Vmax was 1 m/s  LVOT  Vmean was 0 6 m/s  LVOT maxPG was 4 mmHg  LVOT meanPG was 1 8 mmHg  MV A Cory was 0 7 m/s  MV Dec Harnett was 1 m/s2  MV DecT was 333 2 ms   MV E Cory was 0 3 m/s  MV E/A Ratio was 0 5   MV PHT was 96 6 ms    MVA By PHT was 2 3 cm2      HISTORY: PRIOR HISTORY: Hyperlipidemia, Depression, Breast Cancer     PROCEDURE: The study was performed in the [facility] E[facility] 3  This was a routine study  The transthoracic approach was used  The study included complete 2D imaging, M-mode, complete spectral Doppler, and color Doppler  The heart rate was 71 bpm, at  the start of the study  Images were obtained from the parasternal, apical, subcostal, and suprasternal notch acoustic windows  Image quality was adequate      LEFT VENTRICLE: Left ventricle is normal in size and function  Left ventricular ejection fraction is estimated at 55%  Normal wall thickness  Grade 1 diastolic dysfunction  Moderate hypokinesis of the inferobasilar wall      RIGHT VENTRICLE: Right ventricle is normal in size and function      LEFT ATRIUM: Left atrium is normal in size      ATRIAL SEPTUM: The interatrial septum appears to be grossly normal without evidence of shunting by color-flow Doppler      RIGHT ATRIUM: Right atrium is mildly dilated     MITRAL VALVE: Mitral valve opens well  No evidence of mitral stenosis  Trace mitral regurgitation      AORTIC VALVE: Aortic valve opens well  Aortic valve is trileaflet  No evidence aortic stenosis  Mild-to-moderate aortic regurgitation     TRICUSPID VALVE: Tricuspid valve opens well  Mild-to-moderate tricuspid regurgitation  Pulmonary artery systolic pressures are estimated at 25-30 mmHg     PULMONIC VALVE: Pulmonic valve opens well  No pulmonic stenosis or pulmonic regurgitation      PERICARDIUM: There is no evidence of significant pericardial effusion      AORTA: Aortic root is normal in size   Ascending aorta is normal size      SYSTEMIC VEINS: IVC: IVC is normal size with greater than 50% respirophasic collapse      SYSTEM MEASUREMENT TABLES     2D  %FS: 35 6 %  Ao Diam: 2 9 cm  Ao asc: 3 1 cm  EDV(Teich): 86 1 ml  EF(Teich): 65 3 %  ESV(Teich): 29 9 ml  IVSd: 0 7 cm  LA Diam: 3 2 cm  LVEDV MOD A4C: 33 5 ml  LVEF MOD A4C: 38 6 %  LVESV MOD A4C: 20 6 ml  LVIDd: 4 4 cm  LVIDs: 2 8 cm  LVLd A4C: 5 2 cm  LVLs A4C: 5 4 cm  LVPWd: 0 8 cm  RAAs A4C: 16 9 cm2  RAESV A-L: 50 8 ml  RAESV MOD: 49 5 ml  RALs: 4 8 cm  RVIDd: 2 9 cm  RWT: 0 4  SV MOD A4C: 12 9 ml  SV(Teich): 56 2 ml     CW  AR Dec [place]: 2 8 m/s2  AR Dec Time: 1521 1 ms  AR PHT: 441 1 ms  AR Vmax: 4 3 m/s  AR maxP mmHg  AV Env  Ti: 285 4 ms  AV VTI: 19 3 cm  AV Vmax: 1 m/s  AV Vmean: 0 7 m/s  AV maxP mmHg  AV meanP 1 mmHg  TR Vmax: 2 5 m/s  TR maxP 5 mmHg     MM  TAPSE: 2 2 cm     PW  DVI: 0 9  E' Av 1 m/s  E' Lat: 0 1 m/s  E' Sept: 0 1 m/s  E/E' Avg: 3 7  E/E' Lat: 3 6  E/E' Sept: 3 9  LVOT Env  Ti: 277 8 ms  LVOT VTI: 17 2 cm  LVOT Vmax: 1 m/s  LVOT Vmean: 0 6 m/s  LVOT maxP mmHg  LVOT meanP 8 mmHg  MV A Cory: 0 7 m/s  MV Dec [place]: 1 m/s2  MV DecT: 333 2 ms  MV E Cory: 0 3 m/s  MV E/A Ratio: 0 5  MV PHT: 96 6 ms  MVA By PHT: 2 3 cm2     [employer] Accredited Echocardiography Laboratory     Prepared and electronically signed by  Gayatri Chauhan DO  Signed 03-Sep-2021 12:40:04      SIGNATURES:   MD Lilia Swartz MD  2021  3:43 PM  Signed     [facility]  [address] [place] [address]  Phone#  [phone]   Fax#  [phone]  *-*-*-*-*-*-*-*-*-*-*-*-*-*-*-*-*-*-*-*-*-*-*-*-*-*-*-*-*-*-*-*-*-*-*-*-*-*-*-*-*-*-*-*-*-*-*-*-*-*-*-*-*-*                                              Cardiology Consultation       ENCOUNTER DATE: 21 3:13 PM  PATIENT NAME: Bipin Osborne   : 1950    MRN: [id number]  AGE:71 y o  SEX: female  2324 Kaya Mckeon MD     PRIMARY CARE PHYSICIAN: Marii Anthony MD    ACTIVE DIAGNOSIS THIS VISIT  1  Preoperative cardiovascular examination  POCT ECG   2  Symptomatic varicose veins of both lower extremities     3  Mixed hyperlipidemia     4  Abnormal EKG  Ambulatory referral to Cardiology   5   Preoperative clearance  Ambulatory referral to Cardiology     ACTIVE PROBLEM LIST  Patient Active Problem List   Diagnosis    Recurrent major depressive disorder, in partial remission (HCC)    Hyperlipidemia    Pleural thickening    Symptomatic varicose veins of both lower extremities    Vitamin D deficiency    Changing skin lesion    Asthma    Other acute recurrent sinusitis    Dermatitis    Superficial phlebitis of leg, right    Medicare annual wellness visit, subsequent    Left knee injury    Left shoulder pain    Acute upper respiratory infection    Cerumen impaction    Depression    Right foot pain    RLQ abdominal tenderness    Bug bite    Protein-calorie malnutrition ([address] )    Wynne's neuroma of right foot    Chronic pain of right ankle    Right knee injury    Abnormal EKG    Preoperative cardiovascular examination       HPI:    Patient is here for symptomatic varicose vein ablation of both lower extremities  She has no cardiac history  However, she complains of discomfort in her left shoulder which she relates to gardening  She also complains of substernal discomfort which she relates to eating hot peppers  She has a brother who  in his early 62s in his sleep but had been complaining of left shoulder pain which she attributes to him chopping wood  She is a former smoker quitting in   Her brother quit 2 years prior to his episode of sudden death in his sleep  Her father also had heart disease  Patient does not feel she can walk on a treadmill  She has a Wynne's neuroma which she states would be extremely painful for her to walk on a treadmill  Patient's EKG today is normal except for some PVCs  Her previous EKG was read abnormal for low voltage an ectopic atrial rhythm  Neither of these diagnoses are of significance for a surgical procedure or for coronary artery disease  I am most concerned about her history  Rather than her EKG  Her cholesterol is acceptable with an LDL of 102  DISCUSSION/PLAN:    1  Pharmacologic nuclear stress test  2  Echocardiogram  3   Will risk stratify her for surgical procedure following the stress test and echocardiogram    Lab Studies:    Lab Results   Component Value Date    CHOLESTEROL 207 (H) 08/07/2020    CHOLESTEROL 186 04/13/2019    CHOLESTEROL 186 10/14/2016     Lab Results   Component Value Date    TRIG 61 08/07/2020    TRIG 111 04/13/2019    TRIG 68 10/14/2016     Lab Results   Component Value Date    HDL 93 08/07/2020    HDL 77 04/13/2019     (H) 10/14/2016     Lab Results   Component Value Date    LDLCALC 102 (H) 08/07/2020    LDLCALC 88 04/13/2019    LDLCALC 71 10/14/2016     Lab Results   Component Value Date    NONHDL 109 04/13/2019       Lab Results   Component Value Date    EGFR 90 06/22/2021    EGFR 89 05/10/2021    EGFR 88 08/07/2020    SODIUM 140 06/22/2021    SODIUM 137 05/10/2021    SODIUM 138 08/07/2020    K 4 2 06/22/2021    K 4 4 05/10/2021    K 4 6 08/07/2020     06/22/2021     05/10/2021     08/07/2020    CO2 27 06/22/2021    CO2 29 05/10/2021    CO2 27 08/07/2020    BUN 11 06/22/2021    BUN 12 05/10/2021    BUN 13 08/07/2020    CREATININE 0 66 06/22/2021    CREATININE 0 69 05/10/2021    CREATININE 0 71 08/07/2020     Lab Results   Component Value Date    WBC 6 27 05/10/2021    WBC 6 73 08/07/2020    WBC 7 8 04/13/2019    HGB 14 4 05/10/2021    HGB 14 3 08/07/2020    HGB 14 9 04/13/2019    HCT 44 7 05/10/2021    HCT 43 9 08/07/2020    HCT 45 1 (H) 04/13/2019    MCV 91 05/10/2021    MCV 92 08/07/2020    MCV 90 7 04/13/2019    MCH 29 4 05/10/2021    MCH 29 9 08/07/2020    MCH 30 0 04/13/2019    MCHC 32 2 05/10/2021    MCHC 32 6 08/07/2020    MCHC 33 0 04/13/2019     05/10/2021     08/07/2020     04/13/2019        Lab Results   Component Value Date    CALCIUM 8 9 06/22/2021    CALCIUM 8 6 05/10/2021    CALCIUM 9 0 08/07/2020    AST 19 05/10/2021    AST 20 08/07/2020    AST 17 04/13/2019    ALT 24 05/10/2021    ALT 23 08/07/2020    ALT 12 04/13/2019    ALKPHOS 74 05/10/2021    ALKPHOS 78 08/07/2020    ALKPHOS 71 04/13/2019    PROT 6 7 08/11/2017    BILITOT 0 4 08/11/2017       Results for orders placed or performed in visit on 08/09/21   POCT ECG    Narrative     Normal sinus rhythm at a rate 87 beats per minute  Occasional premature ventricular contractions  Otherwise normal EKG           Current Outpatient Medications:     albuterol (PROVENTIL HFA,VENTOLIN HFA) 90 mcg/act inhaler, Inhale 1 puff as needed for wheezing, Disp: 8 5 g, Rfl: 2    ketotifen (ZADITOR) 0 025 % ophthalmic solution, Administer 1 drop to both eyes 2 (two) times a day as needed (eye itching or discomfort), Disp: 5 mL, Rfl: 0    acetaminophen (TYLENOL) 325 mg tablet, Take 650 mg by mouth every 6 (six) hours as needed for mild pain (Patient not taking: Reported on 8/9/2021), Disp: , Rfl:     COD LIVER OIL PO, Take by mouth daily  (Patient not taking: Reported on 7/7/2021), Disp: , Rfl:     EPINEPHrine (EPIPEN) 0 3 mg/0 3 mL SOAJ, Inject 0 3 mL (0 3 mg total) into a muscle once for 1 dose, Disp: 0 6 mL, Rfl: 0    Multiple Vitamin (multivitamin) tablet, Take 1 tablet by mouth daily (Patient not taking: Reported on 7/7/2021), Disp: , Rfl:     Multiple Vitamins-Minerals (PRESERVISION AREDS PO), Take by mouth daily  (Patient not taking: Reported on 7/7/2021), Disp: , Rfl:     Vitamin D, Cholecalciferol, 25 MCG (1000 UT) TABS, Take by mouth daily (Patient not taking: Reported on 7/7/2021), Disp: , Rfl:     Current Facility-Administered Medications:     cyanocobalamin injection 1,000 mcg, 1,000 mcg, Intramuscular, Q30 Days, Shamika Granger MD, 1,000 mcg at 02/17/21 1014    cyanocobalamin injection 1,000 mcg, 1,000 mcg, Intramuscular, Q30 Days, Shamika Granger MD, 1,000 mcg at 11/20/20 1025    cyanocobalamin injection 1,000 mcg, 1,000 mcg, Intramuscular, Q30 Days, Shamika Granger MD, 1,000 mcg at 07/07/21 1210  Allergies   Allergen Reactions    Bee Venom Hives    Gabapentin Tinnitus     Side of head numb    Wasp Venom Protein Hives    Ambrosia Artemisiifolia (Ragweed) Skin Test     Dog Epithelium Allergy Skin Test     Mold Extract  [Trichophyton]     Cat Hair Extract Allergic Rhinitis    Codeine Rash     As child, tolerated as adult      Penicillins Rash    Soy Isoflavones Rash       Past Medical History:   Diagnosis Date    Allergic     penicillin,ragweed,cat,dog,mold,grass,moderate soy    Anxiety     Breast cancer ([address] ) 2003    left breast phyllodes    Cancer [facility])      Social History     Socioeconomic History    Marital status: Single     Spouse name: Not on file    Number of children: Not on file    Years of education: Not on file    Highest education level: Some college, no degree   Occupational History    Occupation: retired   poostal service   Tobacco Use    Smoking status: Former Smoker     Packs/day: 0 25     Years: 20 00     Pack years: 5 00     Quit date:      Years since quittin 6    Smokeless tobacco: Never Used   Vaping Use    Vaping Use: Never used   Substance and Sexual Activity    Alcohol use: Not Currently     Alcohol/week: 1 0 standard drinks     Types: 1 Cans of beer per week     Comment: occasional    Drug use: No    Sexual activity: Not Currently   Other Topics Concern    Not on file   Social History Narrative    Always uses seatbelt        Caffeine use consumes 2 cups of coffee per day         Social Determinants of Health     Financial Resource Strain: Low Risk     Difficulty of Paying Living Expenses: Not hard at all   Food Insecurity: No Food Insecurity    Worried About Running Out of Food in the Last Year: Never true    Nathaniel of Food in the Last Year: Never true   Transportation Needs: No Transportation Needs    Lack of Transportation (Medical): No    Lack of Transportation (Non-Medical):  No   Physical Activity: Sufficiently Active    Days of Exercise per Week: 7 days    Minutes of Exercise per Session: 30 min   Stress: No Stress Concern Present    Feeling of Stress : Only a little   Social Connections: Moderately Isolated    Frequency of Communication with Friends and Family: More than three times a week    Frequency of Social Gatherings with Friends and Family: Three times a week    Attends Denominational Services: More than 4 times per year    Active Member of Clubs or Organizations: No    Attends Club or Organization Meetings: Never    Marital Status:    Intimate Partner Violence: Not At Risk    Fear of Current or Ex-Partner: No    Emotionally Abused: No    Physically Abused: No    Sexually Abused: No      Family History   Problem Relation Age of Onset    Uterine cancer Mother     Varicose Veins Mother     Hypertension Mother     Dysfunctional uterine bleeding Mother     Endometrial cancer Mother 80    COPD Father     Heart disease Father         Cardiac disorder    Heart attack Brother     Diabetes Family     COPD Sister     Lung cancer Sister 72    No Known Problems Maternal Grandmother     No Known Problems Maternal Grandfather     No Known Problems Paternal Grandmother     No Known Problems Paternal Grandfather      Past Surgical History:   Procedure Laterality Date    BREAST BIOPSY Right 2005    benign    BREAST LUMPECTOMY Left 2003    phyllodes    COLONOSCOPY      TONSILLECTOMY      WISDOM TOOTH EXTRACTION         Review of Systems:  Review of Systems   Constitutional: Negative  HENT: Negative  Respiratory: Negative for chest tightness and shortness of breath  Cardiovascular: Positive for chest pain  Negative for leg swelling  Left shoulder pain   Gastrointestinal: Negative  Endocrine: Negative  Genitourinary: Negative  Musculoskeletal: Negative  Skin: Negative  Allergic/Immunologic: Negative  Neurological: Negative  Hematological: Negative  Psychiatric/Behavioral: Negative          Physical Exam:  /80 (BP Location: Left arm, Patient Position: Sitting, Cuff Size: Adult) Pulse 86   Wt 56 1 kg (123 lb 9 6 oz)   LMP  (LMP Unknown)   BMI 20 57 kg/m²     PREVIOUS WEIGHTS:   Wt Readings from Last 10 Encounters:   08/09/21 56 1 kg (123 lb 9 6 oz)   07/07/21 56 2 kg (123 lb 12 8 oz)   07/07/21 55 8 kg (123 lb)   05/21/21 55 3 kg (122 lb)   05/19/21 55 4 kg (122 lb 3 2 oz)   04/14/21 58 kg (127 lb 12 8 oz)   03/22/21 56 7 kg (125 lb)   03/11/21 57 2 kg (126 lb)   03/03/21 57 6 kg (127 lb)   02/17/21 56 7 kg (125 lb)      Physical Exam  Constitutional:       Appearance: She is well-developed  HENT:      Head: Normocephalic and atraumatic  Neck:      Thyroid: No thyromegaly  Vascular: No carotid bruit or JVD  Trachea: No tracheal deviation  Cardiovascular:      Rate and Rhythm: Normal rate and regular rhythm  Heart sounds: Normal heart sounds  No murmur heard  No friction rub  No gallop  Pulmonary:      Effort: Pulmonary effort is normal  No respiratory distress  Breath sounds: Normal breath sounds  No wheezing, rhonchi or rales  Abdominal:      General: Bowel sounds are normal       Palpations: Abdomen is soft  Musculoskeletal:         General: Normal range of motion  Cervical back: Normal range of motion and neck supple  Right lower leg: No edema  Left lower leg: No edema  Skin:     General: Skin is warm and dry  Neurological:      General: No focal deficit present  Mental Status: She is alert and oriented to person, place, and time  Psychiatric:         Mood and Affect: Mood normal          Behavior: Behavior normal          Thought Content: Thought content normal          Judgment: Judgment normal        ======================================================   I have personally reviewed pertinent reports  Portions of the record may have been created with voice recognition software  Occasional wrong word or "sound a like" substitutions may have occurred due to the inherent limitations of voice recognition software   Read the chart carefully and recognize, using context, where substitutions have occurred      SIGNATURES:   Meera Adelr MD

## 2021-09-03 ENCOUNTER — HOSPITAL ENCOUNTER (OUTPATIENT)
Dept: NON INVASIVE DIAGNOSTICS | Facility: HOSPITAL | Age: 71
Discharge: HOME | End: 2021-09-03
Payer: MEDICARE

## 2021-09-03 ENCOUNTER — HOSPITAL ENCOUNTER (OUTPATIENT)
Dept: NUCLEAR MEDICINE | Facility: HOSPITAL | Age: 71
Discharge: HOME | End: 2021-09-03
Payer: MEDICARE

## 2021-09-03 DIAGNOSIS — R07.2 PRECORDIAL PAIN: ICD-10-CM

## 2021-09-03 DIAGNOSIS — Z01.810 PREOPERATIVE CARDIOVASCULAR EXAMINATION: ICD-10-CM

## 2021-09-03 LAB
ARRHY DURING EX: NORMAL
CHEST PAIN STATEMENT: NORMAL
MAX DIASTOLIC BP: 74 MMHG
MAX HEART RATE: 113 BPM
MAX PREDICTED HEART RATE: 151 BPM
MAX. SYSTOLIC BP: 130 MMHG
PROTOCOL NAME: NORMAL
REASON FOR TERMINATION: NORMAL
TARGET HR FORMULA: NORMAL
TEST INDICATION: NORMAL
TIME IN EXERCISE PHASE: NORMAL

## 2021-09-03 PROCEDURE — 93016 CV STRESS TEST SUPVJ ONLY: CPT

## 2021-09-03 PROCEDURE — 93017 CV STRESS TEST TRACING ONLY: CPT

## 2021-09-03 PROCEDURE — 78452 HT MUSCLE IMAGE SPECT MULT: CPT

## 2021-09-03 PROCEDURE — 93018 CV STRESS TEST I&R ONLY: CPT

## 2021-09-03 PROCEDURE — G1004 CDSM NDSC: HCPCS

## 2021-09-03 PROCEDURE — 93306 TTE W/DOPPLER COMPLETE: CPT | Performed by: INTERNAL MEDICINE

## 2021-09-03 PROCEDURE — A9502 TC99M TETROFOSMIN: HCPCS

## 2021-09-03 PROCEDURE — 93306 TTE W/DOPPLER COMPLETE: CPT

## 2021-09-03 RX ADMIN — REGADENOSON 0.4 MG: 0.08 INJECTION, SOLUTION INTRAVENOUS at 11:50

## 2021-09-07 NOTE — TELEPHONE ENCOUNTER
Is patient requesting a call when authorization has been obtained? Patient did not request a call  Surgery Date: 9/14/21  Primary Surgeon: Anjana Evans (NPI: [id number])  Assisting Surgeon: Not Applicable (N/A)  Facility: [facility] (Tax: [id number] / NPI: [id number])  Inpatient / Outpatient: Outpatient  Level: 4    Clearance Received: Yes, Cardiology   Consent Received: Yes, scanned into Epic on 3/22/21  Medication Hold / Last Dose: Not Applicable (N/A)  VQI Spreadsheet: Not Applicable (N/A)  IR Notified: Not Applicable (N/A)  Rep  Notified: Not Applicable (N/A)  Equipment Needs: Not Applicable (N/A)  Vas Lab Requested: Rep  Notified: Possibly angiodynamics Parminder Meza  Patient Contacted: 9/7/21      Diagnosis: I83 893  Procedure/ CPT Code(s): (EVLT) Endovenous Laser Treatment WITH Stab Phlebectomies of the right upper leg // CPT: 12034, 23781     For varicose vein related procedures, last LEVDR? 3/12/21, patient's LEVDR was completed within 6-months of their procedure date  Post Operative Date/ Time: 9/16/21 , 9AM DRESSING REMOVAL 10AM VAS LAB Manpreet with CESAR Mohamud (NPI: [id number])     *Please review with patient med hold, PATs, and check H&P *  PATIENT WAS MAILED SURGERY/SHOWERING/DISCHARGE/COVID INSTRUCTIONS AFTER REVIEWING WITH HER VIA PHONE CALL

## 2021-09-07 NOTE — TELEPHONE ENCOUNTER
Cesar Sotomayor / [facility] number [phone] has been updated to reflect date of service change

## 2021-09-07 NOTE — TELEPHONE ENCOUNTER
From: Km Bearden MD   Sent: 9/4/2021  10:14 AM EDT   To: Cardiology [facility] call patient and tell her that I have approved her surgery  After reviewing her echocardiogram and nuclear stress test     [facility] call Dr Alyx Becerril and tell him the following:   Summary:   1  Recommend proceeding with surgery as planned   2   Patient cardiovascular risk graded to be mild to moderate      Tell him that I have addended my consult note and he can find these recommendations and my opinion in depth

## 2021-09-09 NOTE — PRE-PROCEDURE INSTRUCTIONS
Pre-Surgery Instructions:   Medication Instructions    acetaminophen (TYLENOL) 325 mg tablet Instructed to take as needed including DOS with sips water    albuterol (PROVENTIL HFA,VENTOLIN HFA) 90 mcg/act inhaler Instructed to take as needed including DOS-Instructed to bring DOS    EPINEPHrine (EPIPEN) 0 3 mg/0 3 mL SOAJ Instructed to take as needed including DOS    ketotifen (ZADITOR) 0 025 % ophthalmic solution Instructed to take as needed including DOS    Pre op instructions per My Surgical Experience booklet,medications per anesthesia guidelines and showering instructions per [facility] protocol reviewed-Patient has CHG  Instructed to avoid all ASA/NSAIDs and OTC Vit/Supp from now until after surgery per anesthesia guidelines  Tylenol ok prn  Pt  Verbalized an understanding of all instructions reviewed and offers no concerns at this time

## 2021-09-13 NOTE — ANESTHESIA PREPROCEDURE EVALUATION
Procedure:  ENDOVASCULAR LASER THERAPY (EVLT)W/ PHLEBECTOMY (Right Leg Upper)    Relevant Problems   NEURO/PSYCH   (+) Chronic pain of right ankle   (+) Depression   (+) Recurrent major depressive disorder, in partial remission ([facility] [address] )      PULMONARY   (+) Asthma      STRESS SUMMARY: Duration of pharmacologic stress was 3 min  Maximal heart rate during stress was 113 bpm  The rate-pressure product for the peak heart rate and blood pressure was 72893  There was no chest pain during stress  The stress  test was terminated due to protocol completion  Pre oxygen saturation: 99 %  Peak oxygen saturation: 99 %  The stress ECG was equivocal for ischemia  There were nonspecific T-wave changes initially which resolved on their own  There were  no ST segment changes to suggest an ischemic response  Arrhythmia during stress: isolated premature ventricular beats      Physical Exam    Airway    Mallampati score: II  TM Distance: >3 FB  Neck ROM: full     Dental   Comment:   Full upper, partial lower,     Cardiovascular  Rhythm: regular, Rate: normal, Cardiovascular exam normal    Pulmonary  Pulmonary exam normal Breath sounds clear to auscultation,     Other Findings        Anesthesia Plan  ASA Score- 2     Anesthesia Type- general with ASA Monitors  Additional Monitors:   Airway Plan: LMA  Plan Factors-Exercise tolerance (METS): >4 METS  Chart reviewed  EKG reviewed  Existing labs reviewed  Patient summary reviewed  Patient is not a current smoker  Patient not instructed to abstain from smoking on day of procedure  Patient did not smoke on day of surgery  Induction- intravenous  Postoperative Plan- Plan for postoperative opioid use  Informed Consent- Anesthetic plan and risks discussed with patient

## 2021-09-14 ENCOUNTER — HOSPITAL ENCOUNTER (OUTPATIENT)
Facility: HOSPITAL | Age: 71
Setting detail: OUTPATIENT SURGERY
Discharge: HOME | End: 2021-09-14
Attending: SURGERY | Admitting: SURGERY
Payer: MEDICARE

## 2021-09-14 ENCOUNTER — HOSPITAL ENCOUNTER (OUTPATIENT)
Dept: NON INVASIVE DIAGNOSTICS | Facility: HOSPITAL | Age: 71
Setting detail: OUTPATIENT SURGERY
Discharge: HOME | End: 2021-09-14
Payer: MEDICARE

## 2021-09-14 VITALS
SYSTOLIC BLOOD PRESSURE: 106 MMHG | TEMPERATURE: 97.6 F | BODY MASS INDEX: 20.33 KG/M2 | OXYGEN SATURATION: 95 % | DIASTOLIC BLOOD PRESSURE: 53 MMHG | WEIGHT: 122 LBS | HEART RATE: 77 BPM | RESPIRATION RATE: 16 BRPM | HEIGHT: 65 IN

## 2021-09-14 DIAGNOSIS — I83.891 SYMPTOMATIC VARICOSE VEINS OF RIGHT LOWER EXTREMITY: ICD-10-CM

## 2021-09-14 DIAGNOSIS — I83.893 SYMPTOMATIC VARICOSE VEINS OF BOTH LOWER EXTREMITIES: Primary | ICD-10-CM

## 2021-09-14 PROCEDURE — 99024 POSTOP FOLLOW-UP VISIT: CPT | Performed by: SURGERY

## 2021-09-14 PROCEDURE — 93971 EXTREMITY STUDY: CPT

## 2021-09-14 PROCEDURE — 37765 STAB PHLEB VEINS XTR 10-20: CPT | Performed by: SURGERY

## 2021-09-14 PROCEDURE — 36478 ENDOVENOUS LASER 1ST VEIN: CPT | Performed by: SURGERY

## 2021-09-14 PROCEDURE — NC001 PR NO CHARGE: Performed by: SURGERY

## 2021-09-14 RX ORDER — OXYCODONE HYDROCHLORIDE 5 MG/1
5 TABLET ORAL EVERY 4 HOURS PRN
Status: DISCONTINUED | OUTPATIENT
Start: 2021-09-14 | End: 2021-09-14 | Stop reason: HOSPADM

## 2021-09-14 RX ORDER — ONDANSETRON 2 MG/ML
4 INJECTION INTRAMUSCULAR; INTRAVENOUS EVERY 6 HOURS PRN
Status: DISCONTINUED | OUTPATIENT
Start: 2021-09-14 | End: 2021-09-14 | Stop reason: HOSPADM

## 2021-09-14 RX ORDER — LORATADINE 10 MG
10 TABLET,DISINTEGRATING ORAL AS NEEDED
COMMUNITY

## 2021-09-14 RX ORDER — MAGNESIUM HYDROXIDE 1200 MG/15ML
LIQUID ORAL AS NEEDED
Status: DISCONTINUED | OUTPATIENT
Start: 2021-09-14 | End: 2021-09-14 | Stop reason: HOSPADM

## 2021-09-14 RX ORDER — ONDANSETRON 2 MG/ML
INJECTION INTRAMUSCULAR; INTRAVENOUS AS NEEDED
Status: DISCONTINUED | OUTPATIENT
Start: 2021-09-14 | End: 2021-09-14

## 2021-09-14 RX ORDER — DEXAMETHASONE SODIUM PHOSPHATE 4 MG/ML
INJECTION, SOLUTION INTRA-ARTICULAR; INTRALESIONAL; INTRAMUSCULAR; INTRAVENOUS; SOFT TISSUE AS NEEDED
Status: DISCONTINUED | OUTPATIENT
Start: 2021-09-14 | End: 2021-09-14

## 2021-09-14 RX ORDER — PROPOFOL 10 MG/ML
INJECTION, EMULSION INTRAVENOUS AS NEEDED
Status: DISCONTINUED | OUTPATIENT
Start: 2021-09-14 | End: 2021-09-14

## 2021-09-14 RX ORDER — MIDAZOLAM HYDROCHLORIDE 2 MG/2ML
INJECTION, SOLUTION INTRAMUSCULAR; INTRAVENOUS AS NEEDED
Status: DISCONTINUED | OUTPATIENT
Start: 2021-09-14 | End: 2021-09-14

## 2021-09-14 RX ORDER — HYDROCODONE BITARTRATE AND ACETAMINOPHEN 5; 325 MG/1; MG/1
1 TABLET ORAL EVERY 6 HOURS PRN
Qty: 10 TABLET | Refills: 0 | Status: SHIPPED | OUTPATIENT
Start: 2021-09-14 | End: 2021-09-16 | Stop reason: SDUPTHER

## 2021-09-14 RX ORDER — ACETAMINOPHEN 325 MG/1
975 TABLET ORAL EVERY 6 HOURS PRN
Status: DISCONTINUED | OUTPATIENT
Start: 2021-09-14 | End: 2021-09-14 | Stop reason: HOSPADM

## 2021-09-14 RX ORDER — HYDROMORPHONE HCL/PF 1 MG/ML
0.5 SYRINGE (ML) INJECTION
Status: DISCONTINUED | OUTPATIENT
Start: 2021-09-14 | End: 2021-09-14 | Stop reason: HOSPADM

## 2021-09-14 RX ORDER — MEPERIDINE HYDROCHLORIDE 25 MG/ML
12.5 INJECTION INTRAMUSCULAR; INTRAVENOUS; SUBCUTANEOUS
Status: DISCONTINUED | OUTPATIENT
Start: 2021-09-14 | End: 2021-09-14 | Stop reason: HOSPADM

## 2021-09-14 RX ORDER — ONDANSETRON 2 MG/ML
4 INJECTION INTRAMUSCULAR; INTRAVENOUS ONCE AS NEEDED
Status: DISCONTINUED | OUTPATIENT
Start: 2021-09-14 | End: 2021-09-14 | Stop reason: HOSPADM

## 2021-09-14 RX ORDER — CHLORHEXIDINE GLUCONATE 0.12 MG/ML
15 RINSE ORAL ONCE
Status: COMPLETED | OUTPATIENT
Start: 2021-09-14 | End: 2021-09-14

## 2021-09-14 RX ORDER — OXYCODONE HYDROCHLORIDE 5 MG/1
10 TABLET ORAL EVERY 4 HOURS PRN
Status: DISCONTINUED | OUTPATIENT
Start: 2021-09-14 | End: 2021-09-14 | Stop reason: HOSPADM

## 2021-09-14 RX ORDER — FENTANYL CITRATE 50 UG/ML
INJECTION, SOLUTION INTRAMUSCULAR; INTRAVENOUS AS NEEDED
Status: DISCONTINUED | OUTPATIENT
Start: 2021-09-14 | End: 2021-09-14

## 2021-09-14 RX ORDER — SODIUM CHLORIDE, SODIUM LACTATE, POTASSIUM CHLORIDE, CALCIUM CHLORIDE 600; 310; 30; 20 MG/100ML; MG/100ML; MG/100ML; MG/100ML
125 INJECTION, SOLUTION INTRAVENOUS CONTINUOUS
Status: DISCONTINUED | OUTPATIENT
Start: 2021-09-14 | End: 2021-09-14 | Stop reason: HOSPADM

## 2021-09-14 RX ORDER — FENTANYL CITRATE/PF 50 MCG/ML
25 SYRINGE (ML) INJECTION
Status: DISCONTINUED | OUTPATIENT
Start: 2021-09-14 | End: 2021-09-14 | Stop reason: HOSPADM

## 2021-09-14 RX ORDER — EPHEDRINE SULFATE 50 MG/ML
INJECTION INTRAVENOUS AS NEEDED
Status: DISCONTINUED | OUTPATIENT
Start: 2021-09-14 | End: 2021-09-14

## 2021-09-14 RX ADMIN — OXYCODONE HYDROCHLORIDE 5 MG: 5 TABLET ORAL at 11:28

## 2021-09-14 RX ADMIN — MIDAZOLAM 2 MG: 1 INJECTION INTRAMUSCULAR; INTRAVENOUS at 07:50

## 2021-09-14 RX ADMIN — SODIUM CHLORIDE, SODIUM LACTATE, POTASSIUM CHLORIDE, AND CALCIUM CHLORIDE: .6; .31; .03; .02 INJECTION, SOLUTION INTRAVENOUS at 09:06

## 2021-09-14 RX ADMIN — PROPOFOL 170 MG: 10 INJECTION, EMULSION INTRAVENOUS at 07:54

## 2021-09-14 RX ADMIN — CHLORHEXIDINE GLUCONATE 0.12% ORAL RINSE 15 ML: 1.2 LIQUID ORAL at 07:13

## 2021-09-14 RX ADMIN — DEXAMETHASONE SODIUM PHOSPHATE 4 MG: 4 INJECTION INTRA-ARTICULAR; INTRALESIONAL; INTRAMUSCULAR; INTRAVENOUS; SOFT TISSUE at 07:54

## 2021-09-14 RX ADMIN — LIDOCAINE HYDROCHLORIDE 60 MG: 20 INJECTION INTRAVENOUS at 07:54

## 2021-09-14 RX ADMIN — FENTANYL CITRATE 25 MCG: 50 INJECTION INTRAMUSCULAR; INTRAVENOUS at 09:56

## 2021-09-14 RX ADMIN — EPHEDRINE SULFATE 7.5 MG: 50 INJECTION, SOLUTION INTRAVENOUS at 08:03

## 2021-09-14 RX ADMIN — HYDROMORPHONE HYDROCHLORIDE 0.5 MG: 1 INJECTION, SOLUTION INTRAMUSCULAR; INTRAVENOUS; SUBCUTANEOUS at 10:24

## 2021-09-14 RX ADMIN — FENTANYL CITRATE 50 MCG: 50 INJECTION INTRAMUSCULAR; INTRAVENOUS at 09:33

## 2021-09-14 RX ADMIN — FENTANYL CITRATE 50 MCG: 50 INJECTION INTRAMUSCULAR; INTRAVENOUS at 09:04

## 2021-09-14 RX ADMIN — ONDANSETRON 4 MG: 2 INJECTION INTRAMUSCULAR; INTRAVENOUS at 07:54

## 2021-09-14 RX ADMIN — EPHEDRINE SULFATE 7.5 MG: 50 INJECTION, SOLUTION INTRAVENOUS at 08:00

## 2021-09-14 RX ADMIN — SODIUM CHLORIDE, SODIUM LACTATE, POTASSIUM CHLORIDE, AND CALCIUM CHLORIDE 125 ML/HR: .6; .31; .03; .02 INJECTION, SOLUTION INTRAVENOUS at 07:10

## 2021-09-14 RX ADMIN — HYDROMORPHONE HYDROCHLORIDE 0.5 MG: 1 INJECTION, SOLUTION INTRAMUSCULAR; INTRAVENOUS; SUBCUTANEOUS at 10:46

## 2021-09-14 RX ADMIN — PROPOFOL 50 MG: 10 INJECTION, EMULSION INTRAVENOUS at 09:04

## 2021-09-14 RX ADMIN — FENTANYL CITRATE 25 MCG: 50 INJECTION INTRAMUSCULAR; INTRAVENOUS at 10:09

## 2021-09-14 NOTE — OP NOTE
OPERATIVE REPORT  PATIENT NAME: Pema Kumar    :  1950  MRN: [id number]  Pt Location: [facility] 09    SURGERY DATE: 2021    Surgeon(s) and Role:     * Matthias Sen MD - Primary     * Haim Green MD - Assisting    Preoperative diagnosis:   Symptomatic varicose veins of both lower extremities [I83 893]    Post-Op Diagnosis Codes: * Symptomatic varicose veins of both lower extremities [I83 893]      Procedure(s):  ENDOVASCULAR LASER THERAPY (EVLT)W/ PHLEBECTOMY x19    Specimen(s):  * No specimens in log *    Estimated Blood Loss:   Minimal    Drains:  * No LDAs found *    Anesthesia Type:   General/LMA    Operative Indications:  Symptomatic varicose veins of both lower extremities [R73389]  73year-old with right lower extremity pain associated with multiple varicosities found to have insufficiency the greater saphenous vein presents for elective treatment  Operative Findings:  Greater saphenous vein successfully ablated from the saphenofemoral junction to the knee  The common femoral vein remained echo free and compressible with no evidence of DVT  Complications:   None    Procedure and Technique:    LMA anesthesia was administered  The right leg was prepped and draped in the normal sterile fashion with chlorhexidine prep  Duplex ultrasound was performed to identify the greater saphenous vein from the level of the knee to the saphenofemoral junction  The greater saphenous vein was then cannulated at the knee level under direct ultrasound guidance with a micropuncture system  A sheath was then passed through the saphenofemoral junction  A laser cable was passed through the sheath and then withdrawn to a position 2-2 5 cm from the saphenofemoral junction  Tumescent solution was then injected in the charbel venous sheath throughout the thigh  Ablation was then performed with a 7 mV setting with ablation of a 35 cm segment of the greater saphenous vein    The greater saphenous vein was then isonated using color flow imaging  Successful ablation was confirmed  The saphenofemoral junction was noted to be patent  The common femoral vein was fully compressible with no evidence of DVT  Phlebectomy was then performed to remove the varicosities marked preop  Nineteen separate stab incisions were made overlying the varicosities  Phlebectomy was then performed with an avulsion technique  Manual compression was held for hemostasis  The wounds were cleansed  Steri-Strips were placed  Gauze, Kerlix, Ace and Coban wraps were then placed from the level of the foot to the upper thigh  The patient was then woken from anesthesia and transferred to the recovery room         I was present for the entire procedure    Patient Disposition:  PACU       SIGNATURE: Sury Goins MD  DATE: September 14, 2021  TIME: 9:51 AM

## 2021-09-14 NOTE — PROGRESS NOTES
Rt lower leg started with red rash since used the chlorhexydine wash/wipes  Given washcloth with water to rinse off    Denies itching

## 2021-09-14 NOTE — INTERIM OP NOTE
OPERATIVE REPORT  PATIENT NAME: Brooklynn Kaye    :  1950  MRN: [id number]  Pt Location: [facility] 09    SURGERY DATE: 2021    Surgeon(s) and Role:     * Kenneth Rosa MD - Primary     * Alanis Pratt MD - Assisting    Preoperative diagnosis:   Symptomatic varicose veins of both lower extremities [I83 893]    Post-Op Diagnosis Codes: * Symptomatic varicose veins of both lower extremities [I83 893]      Procedure(s):  ENDOVASCULAR LASER THERAPY (EVLT)W/ PHLEBECTOMY x19    Specimen(s):  * No specimens in log *    Estimated Blood Loss:   Minimal    Drains:  * No LDAs found *    Anesthesia Type:   General/LMA    Operative Indications:  Symptomatic varicose veins of both lower extremities [H48961]  73year-old with right lower extremity pain associated with multiple varicosities found to have insufficiency the greater saphenous vein presents for elective treatment  Operative Findings:  Greater saphenous vein successfully ablated from the saphenofemoral junction to the knee  The common femoral vein remained echo free and compressible with no evidence of DVT  Complications:   None    Procedure and Technique:    LMA anesthesia was administered  The right leg was prepped and draped in the normal sterile fashion with chlorhexidine prep  Duplex ultrasound was performed to identify the greater saphenous vein from the level of the knee to the saphenofemoral junction  The greater saphenous vein was then cannulated at the knee level under direct ultrasound guidance with a micropuncture system  A sheath was then passed through the saphenofemoral junction  A laser cable was passed through the sheath and then withdrawn to a position 2-2 5 cm from the saphenofemoral junction  Tumescent solution was then injected in the charbel venous sheath throughout the thigh  Ablation was then performed with a 7 mV setting with ablation of a 35 cm segment of the greater saphenous vein    The greater saphenous vein was then isonated using color flow imaging  Successful ablation was confirmed  The saphenofemoral junction was noted to be patent  The common femoral vein was fully compressible with no evidence of DVT  Phlebectomy was then performed to remove the varicosities marked preop  Nineteen separate stab incisions were made overlying the varicosities  Phlebectomy was then performed with an avulsion technique  Manual compression was held for hemostasis  The wounds were cleansed  Steri-Strips were placed  Gauze, Kerlix, Ace and Coban wraps were then placed from the level of the foot to the upper thigh  The patient was then woken from anesthesia and transferred to the recovery room         I was present for the entire procedure    Patient Disposition:  PACU       SIGNATURE: Damir Baltazar MD  DATE: September 14, 2021  TIME: 9:51 AM

## 2021-09-14 NOTE — H&P
Symptomatic varicose veins of both lower extremities    Symptomatic varicose veins right lower extremity  She has completed full cardiac workup and clearance  We will proceed with venous ablation and phlebectomy which will be scheduled in the near future             HPI: 68-year-old has a long history of symptomatic varicose veins more severe on the right as compared to left  She also has a history of right calf vein phlebitis  She has been utilizing compressive stockings for long period time and in fact had previously been scheduled for venous ablation and phlebectomy but because of personal reasons had to cancel  Specifically 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  She continues to note discomfort in her left calf in the area of varicosities which worsens throughout the day and does improve with elevation and compression but despite all these maneuvers continues to cause significant discomfort       Venous reflux study 03/12/2021 with incompetency of the right greater saphenous vein  I have reviewed and made appropriate changes to the review of systems input by the medical assistant      Vitals:    09/09/21 1111 09/14/21 0658   BP:  118/58   Pulse:  88   Resp:  16   Temp:  98 3 °F (36 8 °C)   TempSrc:  Temporal   SpO2:  97%   Weight: 55 8 kg (123 lb) 55 3 kg (122 lb)   Height: 5' 5" (1 651 m) 5' 5" (1 651 m)       Patient Active Problem List   Diagnosis    Recurrent major depressive disorder, in partial remission (HCC)    Hyperlipidemia    Pleural thickening    Symptomatic varicose veins of both lower extremities    Vitamin D deficiency    Changing skin lesion    Asthma    Other acute recurrent sinusitis    Dermatitis    Superficial phlebitis of leg, right    Medicare annual wellness visit, subsequent    Left knee injury    Left shoulder pain    Acute upper respiratory infection    Cerumen impaction    Depression    Right foot pain    RLQ abdominal tenderness    Bug bite  Protein-calorie malnutrition ([facility] [address] )    Wynne's neuroma of right foot    Chronic pain of right ankle    Right knee injury    Abnormal EKG    Preoperative cardiovascular examination       Past Surgical History:   Procedure Laterality Date    BREAST BIOPSY Right 2005    benign    BREAST LUMPECTOMY Left     phyllodes    COLONOSCOPY      TONSILLECTOMY      WISDOM TOOTH EXTRACTION         Family History   Problem Relation Age of Onset    Uterine cancer Mother     Varicose Veins Mother     Hypertension Mother     Dysfunctional uterine bleeding Mother     Endometrial cancer Mother 80    COPD Father     Heart disease Father         Cardiac disorder    Heart attack Brother     Diabetes Family     COPD Sister     Lung cancer Sister 72    No Known Problems Maternal Grandmother     No Known Problems Maternal Grandfather     No Known Problems Paternal Grandmother     No Known Problems Paternal Grandfather        Social History     Socioeconomic History    Marital status: Single     Spouse name: Not on file    Number of children: Not on file    Years of education: Not on file    Highest education level: Some college, no degree   Occupational History    Occupation: [employer]   Tobacco Use    Smoking status: Former Smoker     Packs/day: 0 25     Years: 20 00     Pack years: 5 00     Quit date:      Years since quittin 7    Smokeless tobacco: Never Used   Vaping Use    Vaping Use: Never used   Substance and Sexual Activity    Alcohol use: Not Currently     Alcohol/week: 1 0 standard drinks     Types: 1 Cans of beer per week     Comment: rarely    Drug use: No    Sexual activity: Not Currently   Other Topics Concern    Not on file   Social History Narrative    Always uses seatbelt        Caffeine use consumes 2 cups of coffee per day         Social Determinants of Health     Financial Resource Strain: Low Risk     Difficulty of Paying Living Expenses: Not hard at all   Food Insecurity: No Food Insecurity    Worried About Running Out of Food in the Last Year: Never true    Ran Out of Food in the Last Year: Never true   Transportation Needs: No Transportation Needs    Lack of Transportation (Medical): No    Lack of Transportation (Non-Medical): No   Physical Activity: Sufficiently Active    Days of Exercise per Week: 7 days    Minutes of Exercise per Session: 30 min   Stress: No Stress Concern Present    Feeling of Stress : Only a little   Social Connections: Moderately Isolated    Frequency of Communication with Friends and Family: More than three times a week    Frequency of Social Gatherings with Friends and Family:  Three times a week    Attends Rastafari Services: More than 4 times per year    Active Member of Clubs or Organizations: No    Attends Club or Organization Meetings: Never    Marital Status:    Intimate Partner Violence: Not At Risk    Fear of Current or Ex-Partner: No    Emotionally Abused: No    Physically Abused: No    Sexually Abused: No       Allergies   Allergen Reactions    Bee Venom Hives     "Wasps"    Gabapentin Tinnitus     Side of head numb    Wasp Venom Protein Hives    Ambrosia Artemisiifolia (Ragweed) Skin Test Allergic Rhinitis    Cat Hair Extract Allergic Rhinitis    Codeine Rash     As child, tolerated as adult      Mold Extract [Trichophyton] Allergic Rhinitis    Penicillins Rash    Soy Isoflavones Rash         Current Facility-Administered Medications:     chlorhexidine (PERIDEX) 0 12 % oral rinse 15 mL, 15 mL, Swish & Spit, Once, Shaji Martin MD    lactated ringers infusion, 125 mL/hr, Intravenous, Continuous, Antwan Cornea Lacey, DO, Last Rate: 125 mL/hr at 09/14/21 0710, 125 mL/hr at 09/14/21 0710    lidocaine (PF) (XYLOCAINE-MPF) 1 % 25 mL, EPINEPHrine PF (ADRENALIN) 0 5 mg in sodium chloride 0 9 % 500 mL OR irrigation, , Irrigation, Once, Shaji Martin MD      Review of Systems   Constitutional: Negative  Negative for chills and fever  HENT: Negative  Eyes: Negative  Respiratory: Negative  Negative for cough, chest tightness and shortness of breath  Cardiovascular: Positive for leg swelling (R leg)  Gastrointestinal: Negative  Negative for diarrhea, nausea and vomiting  Endocrine: Negative  Genitourinary: Negative  Musculoskeletal: Negative  Skin: Negative  Negative for wound  Allergic/Immunologic: Negative  Neurological: Positive for numbness (R foot)  Negative for dizziness, speech difficulty, weakness and headaches  Hematological: Bruises/bleeds easily  Psychiatric/Behavioral: Negative  Negative for self-injury and suicidal ideas               Physical Exam  Constitutional:       Appearance: She is well-developed  Musculoskeletal: Normal range of motion and neck supple  Vascular: No carotid bruit or JVD  Cardiovascular:      Rate and Rhythm: Normal rate and regular rhythm  Pulses:           Dorsalis pedis pulses are 2+ on the right side and 2+ on the left side  Heart sounds: No murmur  Comments:   Bilateral lower extremities with varicose veins  On the right prominent varicosities mostly in the posterior aspect of the calf and the anterior shin and ankle range in size from 3- 5 mm  Pulmonary:      Effort: Pulmonary effort is normal  No respiratory distress  Breath sounds: Normal breath sounds  Musculoskeletal: Normal range of motion  General: No tenderness  Skin:     General: Skin is warm and dry  Comments: Areas of skin her hyper pigmented mostly at the level of the ankle consistent with chronic venous insufficiency  Neurological:      Mental Status: She is alert and oriented to person, place, and time  Sensory: No sensory deficit

## 2021-09-14 NOTE — DISCHARGE INSTRUCTIONS
DISCHARGE INSTRUCTIONS        VARICOSE VEIN SURGERY    1) When released from the hospital, you should have a compression bandage in place on the operated leg  This bandage should feel snug but not too tight  If the bandage becomes blood soaked or painfully tight, elevate your leg and call your surgeon immediately  2) If the operated leg becomes increasingly painful or swollen, or if there is increasing redness or pain around your incision, contact our office  3) For the first day after your operation elevate your leg as much as possible  You are encouraged to walk at least 20 minutes the first day  When sitting, the leg should be elevated  The preferred position is to have the leg at or above the level of the heart  Starting on the second post-op day, walking is strongly encouraged as tolerated  4) Some bruising of the skin is common after varicose vein surgery  This can be lessened by strict elevation of the leg  Many patients will notice some numbness of the shin, ankle, calf, or the top of the foot  This usually fades with time, but may be persistent  After surgery you can expect bruising, swelling and hard knots on your leg  As your body heals the bruising will fade and the swelling and knots will subside  5) Observe incisions daily  Report to our office any of the following:  a) Any areas that are red and angry in appearance  b) Any drainage that is milky or cloudy in appearance or that has a foul odor  c) Elevated temperature of 100 5 degrees F or greater  6) Apply sunscreen with SPF 30 to incisions while sun bathing for up to one year after surgery to reduce the chances of your incisions darkening  7) Your first post-operative appointment will be 2 to 3 days after your surgery  At this appointment, your bandages will be removed and the scheduled office surgeon, not necessarily the surgeon that did your surgery, will see you               8)         If have any questions, please call our office at ([phone])  PLEASE CALL THE OFFICE IF YOU HAVE ANY QUESTIONS    [phone] Elian Patel [phone] [facility] FREE [phone]  [facility] , [address], [place], [address]  [address], [address], [place], [address]  [address], [facility], [phone] [facility], [facility], [facility], [address]    [address], [address]st [address], [address]  [facility], [address] [facility], [address]  [address] [facility], [address] [facility], [address] [facility]  [address] [facility], [address] [facility], [facility]  201 [facility], [address]  [address] [place] [address] [address]

## 2021-09-14 NOTE — ANESTHESIA POSTPROCEDURE EVALUATION
Post-Op Assessment Note    CV Status:  Stable    Pain management: adequate     Mental Status:  Alert and awake   Hydration Status:  Euvolemic   PONV Controlled:  Controlled   Airway Patency:  Patent      Post Op Vitals Reviewed: Yes      Staff: Anesthesiologist         No complications documented      BP      Temp      Pulse     Resp      SpO2 no

## 2021-09-15 NOTE — PROGRESS NOTES
Assessment/Plan:    Symptomatic varicose veins of both lower extremities  73year old female former smoker with HLD, hx L breast cancer '03, s/p lumpectomy, R foot Wynne's neuroma and longstanding symptomatic BLE varicose veins, R>L, with GSV incompetence and hx of R calf SVT '18, now s/p R GSV EVLT w/ stab phlebectomy x19 by Dr Shantelle Larsen on 9/14/21, presenting for post operative visit  -patient doing well post operatively, has some ankle soreness near stab sites  -incisions are clean, dry and intact with steri strips in place  -no significant lower extremity noted on exam, there are small areas of ecchymosis near stab sites as expected post operatively   -continue conservative measures to include daily use of compression stockings, lower extremity elevation, low-sodium diet, aerobic activity, weight management and skin moisturization  -LEV to be performed this morning to r/o EHIT  -follow up in 4-6 weeks with Dr Shantelle Larsen for reassessment        Diagnoses and all orders for this visit:    Symptomatic varicose veins of both lower extremities  -     HYDROcodone-acetaminophen (NORCO) 5-325 mg per tablet; Take 1 tablet by mouth every 6 (six) hours as needed for pain for up to 10 daysMax Daily Amount: 4 tablets          Subjective:      Patient ID: Pablo Rae is a 73 y o  female  Patient presents S/P from EVLT with Dr Shantelle Larsen 9/14/2021  Pt reports a temperature of 99 at home, as well as some swelling and pain at her R ankle  Incision site clean dry and intact, with some bruising on medial R ankle  Patient is a former smoker  29-year-old female with symptomatic varicose veins of the bilateral lower extremities, right greater than left, presents s/p right GSV EVLT with stab phlebectomy times 19 for postoperative visit  Patient has been doing well  Has significant ankle and posterior knee tenderness near stab sites  Has overall been doing well otherwise        The following portions of the patient's history were reviewed and updated as appropriate: allergies, current medications, past family history, past medical history, past social history, past surgical history and problem list     Review of Systems   Constitutional: Negative  HENT: Negative  Eyes: Negative  Respiratory: Negative  Cardiovascular: Negative  Gastrointestinal: Positive for nausea (Post surgery)  Endocrine: Negative  Genitourinary: Negative  Musculoskeletal: Negative  Skin: Positive for wound (R ankle)  Allergic/Immunologic: Negative  Neurological: Negative  Hematological: Negative  Psychiatric/Behavioral: Positive for sleep disturbance (sleeping more than usual)  I have reviewed and made appropriate changes to the review of systems input by the medical assistant      Vitals:    09/16/21 0859   BP: 126/80   BP Location: Right arm   Patient Position: Sitting   Cuff Size: Standard   Pulse: 76   Temp: 99 2 °F (37 3 °C)   TempSrc: Tympanic   Weight: 55 7 kg (122 lb 12 8 oz)   Height: 5' 5" (1 651 m)       Patient Active Problem List   Diagnosis    Recurrent major depressive disorder, in partial remission (HCC)    Pleural thickening    Symptomatic varicose veins of both lower extremities    Vitamin D deficiency    Changing skin lesion    Asthma    Other acute recurrent sinusitis    Dermatitis    Superficial phlebitis of leg, right    Medicare annual wellness visit, subsequent    Left knee injury    Left shoulder pain    Cerumen impaction    Depression    Right foot pain    RLQ abdominal tenderness    Bug bite    Protein-calorie malnutrition ([address] )    Wynne's neuroma of right foot    Chronic pain of right ankle    Right knee injury    Abnormal EKG    Preoperative cardiovascular examination       Past Surgical History:   Procedure Laterality Date    BREAST BIOPSY Right 2005    benign    BREAST LUMPECTOMY Left 2003    phyllodes    COLONOSCOPY      DC ENDOVENOUS LASER, 1ST VEIN Right 9/14/2021 Procedure: ENDOVASCULAR LASER THERAPY (EVLT)W/ PHLEBECTOMY x19;  Surgeon: Shaji Martin MD;  Location: [facility];  Service: Vascular    TONSILLECTOMY      WISDOM TOOTH EXTRACTION         Family History   Problem Relation Age of Onset    Uterine cancer Mother     Varicose Veins Mother     Hypertension Mother     Dysfunctional uterine bleeding Mother     Endometrial cancer Mother 80    COPD Father     Heart disease Father         Cardiac disorder    Heart attack Brother     Diabetes Family     COPD Sister     Lung cancer Sister 72    No Known Problems Maternal Grandmother     No Known Problems Maternal Grandfather     No Known Problems Paternal Grandmother     No Known Problems Paternal Grandfather        Social History     Socioeconomic History    Marital status: Single     Spouse name: Not on file    Number of children: Not on file    Years of education: Not on file    Highest education level: Some college, no degree   Occupational History    Occupation: retired   [employer] service   Tobacco Use    Smoking status: Former Smoker     Packs/day: 0 25     Years: 20 00     Pack years: 5 00     Quit date:      Years since quittin 7    Smokeless tobacco: Never Used   Vaping Use    Vaping Use: Never used   Substance and Sexual Activity    Alcohol use: Not Currently     Alcohol/week: 1 0 standard drinks     Types: 1 Cans of beer per week     Comment: rarely    Drug use: No    Sexual activity: Not Currently   Other Topics Concern    Not on file   Social History Narrative    Always uses seatbelt        Caffeine use consumes 2 cups of coffee per day         Social Determinants of Health     Financial Resource Strain: Low Risk     Difficulty of Paying Living Expenses: Not hard at all   Food Insecurity: No Food Insecurity    Worried About Running Out of Food in the Last Year: Never true    Nathaniel of Food in the Last Year: Never true   Transportation Needs: No Transportation Needs    Lack of Transportation (Medical): No    Lack of Transportation (Non-Medical): No   Physical Activity: Sufficiently Active    Days of Exercise per Week: 7 days    Minutes of Exercise per Session: 30 min   Stress: No Stress Concern Present    Feeling of Stress : Only a little   Social Connections: Moderately Isolated    Frequency of Communication with Friends and Family: More than three times a week    Frequency of Social Gatherings with Friends and Family:  Three times a week    Attends Gnosticist Services: More than 4 times per year    Active Member of Clubs or Organizations: No    Attends Club or Organization Meetings: Never    Marital Status:    Intimate Partner Violence: Not At Risk    Fear of Current or Ex-Partner: No    Emotionally Abused: No    Physically Abused: No    Sexually Abused: No       Allergies   Allergen Reactions    Bee Venom Hives     "Wasps"    Gabapentin Tinnitus     Side of head numb    Wasp Venom Protein Hives    Ambrosia Artemisiifolia (Ragweed) Skin Test Allergic Rhinitis    Cat Hair Extract Allergic Rhinitis    Codeine Rash     As child, tolerated as adult      Mold Extract [Trichophyton] Allergic Rhinitis    Penicillins Rash    Soy Isoflavones Rash         Current Outpatient Medications:     EPINEPHrine (EPIPEN) 0 3 mg/0 3 mL SOAJ, Inject 0 3 mL (0 3 mg total) into a muscle once for 1 dose, Disp: 0 6 mL, Rfl: 0    HYDROcodone-acetaminophen (NORCO) 5-325 mg per tablet, Take 1 tablet by mouth every 6 (six) hours as needed for pain for up to 10 daysMax Daily Amount: 4 tablets, Disp: 10 tablet, Rfl: 0    loratadine (Alavert) 10 MG dissolvable tablet, Take 10 mg by mouth as needed , Disp: , Rfl:     Polyethylene Glycol 3350 (MIRALAX PO), Take by mouth as needed , Disp: , Rfl:     acetaminophen (TYLENOL) 325 mg tablet, Take 650 mg by mouth every 6 (six) hours as needed for mild pain  (Patient not taking: Reported on 9/16/2021), Disp: , Rfl:     albuterol (PROVENTIL HFA,VENTOLIN HFA) 90 mcg/act inhaler, Inhale 1 puff as needed for wheezing (Patient not taking: Reported on 9/16/2021), Disp: 8 5 g, Rfl: 2    ketotifen (ZADITOR) 0 025 % ophthalmic solution, Administer 1 drop to both eyes 2 (two) times a day as needed (eye itching or discomfort) (Patient not taking: Reported on 9/16/2021), Disp: 5 mL, Rfl: 0    Current Facility-Administered Medications:     cyanocobalamin injection 1,000 mcg, 1,000 mcg, Intramuscular, Q30 Days, Jensen Echavarria MD, 1,000 mcg at 07/07/21 1210     Objective:      /80 (BP Location: Right arm, Patient Position: Sitting, Cuff Size: Standard)   Pulse 76   Temp 99 2 °F (37 3 °C) (Tympanic)   Ht 5' 5" (1 651 m)   Wt 55 7 kg (122 lb 12 8 oz)   LMP  (LMP Unknown)   BMI 20 43 kg/m²          Physical Exam  Constitutional:       General: She is not in acute distress  Appearance: Normal appearance  She is not ill-appearing, toxic-appearing or diaphoretic  HENT:      Head: Normocephalic and atraumatic  Right Ear: External ear normal       Left Ear: External ear normal       Nose: Nose normal       Mouth/Throat:      Mouth: Mucous membranes are moist       Pharynx: Oropharynx is clear  Eyes:      General: No scleral icterus  Extraocular Movements: Extraocular movements intact  Conjunctiva/sclera: Conjunctivae normal    Cardiovascular:      Rate and Rhythm: Normal rate and regular rhythm  Heart sounds: Normal heart sounds  Pulmonary:      Effort: Pulmonary effort is normal  No respiratory distress  Breath sounds: Normal breath sounds  Abdominal:      General: Abdomen is flat  Palpations: Abdomen is soft  Tenderness: There is no abdominal tenderness  Musculoskeletal:         General: Normal range of motion  Cervical back: Normal range of motion and neck supple  Right lower leg: No edema  Left lower leg: No edema  Skin:     General: Skin is warm and dry        Comments: Incisions are clean, dry and intact with steri strips in place   Neurological:      General: No focal deficit present  Mental Status: She is alert and oriented to person, place, and time  Mental status is at baseline  Cranial Nerves: No cranial nerve deficit  Sensory: No sensory deficit  Motor: No weakness     Psychiatric:         Mood and Affect: Mood normal          Behavior: Behavior normal

## 2021-09-16 ENCOUNTER — OFFICE VISIT (OUTPATIENT)
Dept: VASCULAR SURGERY | Facility: CLINIC | Age: 71
End: 2021-09-16

## 2021-09-16 ENCOUNTER — HOSPITAL ENCOUNTER (OUTPATIENT)
Dept: NON INVASIVE DIAGNOSTICS | Facility: CLINIC | Age: 71
Discharge: HOME | End: 2021-09-16
Payer: MEDICARE

## 2021-09-16 VITALS
SYSTOLIC BLOOD PRESSURE: 126 MMHG | DIASTOLIC BLOOD PRESSURE: 80 MMHG | TEMPERATURE: 99.2 F | BODY MASS INDEX: 20.46 KG/M2 | HEIGHT: 65 IN | WEIGHT: 122.8 LBS | HEART RATE: 76 BPM

## 2021-09-16 DIAGNOSIS — I83.893 SYMPTOMATIC VARICOSE VEINS OF BOTH LOWER EXTREMITIES: ICD-10-CM

## 2021-09-16 DIAGNOSIS — I83.893 SYMPTOMATIC VARICOSE VEINS OF BOTH LOWER EXTREMITIES: Primary | ICD-10-CM

## 2021-09-16 PROCEDURE — 93971 EXTREMITY STUDY: CPT

## 2021-09-16 PROCEDURE — 99024 POSTOP FOLLOW-UP VISIT: CPT | Performed by: PHYSICIAN ASSISTANT

## 2021-09-16 RX ORDER — HYDROCODONE BITARTRATE AND ACETAMINOPHEN 5; 325 MG/1; MG/1
1 TABLET ORAL EVERY 6 HOURS PRN
Qty: 10 TABLET | Refills: 0 | Status: SHIPPED | OUTPATIENT
Start: 2021-09-16 | End: 2021-09-26

## 2021-09-16 NOTE — ASSESSMENT & PLAN NOTE
73year old female former smoker with HLD, hx L breast cancer '03, s/p lumpectomy, R foot Wynne's neuroma and longstanding symptomatic BLE varicose veins, R>L, with GSV incompetence and hx of R calf SVT '18, now s/p R GSV EVLT w/ stab phlebectomy x19 by Dr Mindy Abraham on 9/14/21, presenting for post operative visit      -patient doing well post operatively, has some ankle soreness near stab sites  -incisions are clean, dry and intact with steri strips in place  -no significant lower extremity noted on exam, there are small areas of ecchymosis near stab sites as expected post operatively   -continue conservative measures to include daily use of compression stockings, lower extremity elevation, low-sodium diet, aerobic activity, weight management and skin moisturization  -LEV to be performed this morning to r/o EHIT  -follow up in 4-6 weeks with Dr Mindy Abraham for reassessment

## 2021-09-16 NOTE — PATIENT INSTRUCTIONS
Endovenous Ablation   WHAT YOU NEED TO KNOW:   Endovenous ablation is a procedure that uses radiofrequency or laser energy to treat varicose veins  Varicose veins are large, twisted veins in your legs that bulge out under your skin  Endovenous ablation may help treat pain, discolored skin, or ulcers in your leg that are caused by varicose veins  DISCHARGE INSTRUCTIONS:   Call your local emergency number (911 in the [address] [facility]) for any of the following:   · You feel lightheaded, short of breath, and have chest pain  · You cough up blood  · You have trouble breathing  Seek care immediately if:   · Blood soaks through your bandage  · Your leg feels warm, tender and painful  · Any part of your leg is numb or pale to the touch  Call your doctor if:   · You have a fever or chills  · Your wound is red, swollen, or draining pus  · You have nausea or are vomiting  · Your skin is itchy, swollen, or you have a rash  · You have questions or concerns about your condition or care  Medicines: You may need any of the following:  · Prescription pain medicine  may be given  Ask your healthcare provider how to take this medicine safely  Some prescription pain medicines contain acetaminophen  Do not take other medicines that contain acetaminophen without talking to your healthcare provider  Too much acetaminophen may cause liver damage  Prescription pain medicine may cause constipation  Ask your healthcare provider how to prevent or treat constipation  · NSAIDs , such as ibuprofen, help decrease swelling, pain, and fever  NSAIDs can cause stomach bleeding or kidney problems in certain people  If you take blood thinner medicine, always ask your healthcare provider if NSAIDs are safe for you  Always read the medicine label and follow directions  · Take your medicine as directed  Contact your healthcare provider if you think your medicine is not helping or if you have side effects   Tell him or her if you are allergic to any medicine  Keep a list of the medicines, vitamins, and herbs you take  Include the amounts, and when and why you take them  Bring the list or the pill bottles to follow-up visits  Carry your medicine list with you in case of an emergency  Care for your procedure area as directed:  Carefully wash the area with soap and water  Dry the area and put on new, clean bandages as directed  Change your bandages when they get wet or dirty  Check for signs of infection such as redness, swelling, or pus  Self-care:   · Stay active  Do not spend too much time sitting or standing  If you need to stand, shift your weight from leg to leg often  Take short walks throughout the day  Walking will improve blood flow and prevent blood clots in your leg  Ask your healthcare provider how much activity you need to do every day  · Do not cross your legs when you sit  This decreases blood flow to your feet and can make your symptoms worse  · Wear compression stockings or bandages as directed  The stockings and bandages are snug and put pressure on your legs  This improves blood flow and helps prevent clots  · Apply a warm compress as directed  A warm compress can be a small towel or washcloth  Moisten it in warm (not hot) water  Apply the warm compress to your leg  A warm compress may help with discomfort from injury to your vein during the procedure  · Elevate your leg  Keep your leg above the level of your heart as often as possible  This will help decrease swelling and pain  Prop your leg on pillows or blankets to keep it elevated comfortably  · Do not wear tight clothing or shoes  Do not wear high-heeled shoes  Do not wear clothes that are tight around the waist or knees  Follow up with your doctor as directed:  Write down your questions so you remember to ask them during your visits    © Copyright [employer] 2021 Information is for End User's use only and may not be sold, redistributed or otherwise used for commercial purposes  All illustrations and images included in CareNotes® are the copyrighted property of [employer]  or [employer]  The above information is an  only  It is not intended as medical advice for individual conditions or treatments  Talk to your doctor, nurse or pharmacist before following any medical regimen to see if it is safe and effective for you

## 2021-10-14 ENCOUNTER — OFFICE VISIT (OUTPATIENT)
Dept: VASCULAR SURGERY | Facility: CLINIC | Age: 71
End: 2021-10-14

## 2021-10-14 VITALS
WEIGHT: 122 LBS | HEIGHT: 65 IN | HEART RATE: 85 BPM | DIASTOLIC BLOOD PRESSURE: 80 MMHG | BODY MASS INDEX: 20.33 KG/M2 | SYSTOLIC BLOOD PRESSURE: 124 MMHG

## 2021-10-14 DIAGNOSIS — I83.893 SYMPTOMATIC VARICOSE VEINS OF BOTH LOWER EXTREMITIES: Primary | ICD-10-CM

## 2021-10-14 PROCEDURE — 99024 POSTOP FOLLOW-UP VISIT: CPT | Performed by: SURGERY

## 2021-11-17 ENCOUNTER — OFFICE VISIT (OUTPATIENT)
Dept: FAMILY MEDICINE CLINIC | Facility: CLINIC | Age: 71
End: 2021-11-17
Payer: MEDICARE

## 2021-11-17 VITALS
HEART RATE: 87 BPM | BODY MASS INDEX: 20.33 KG/M2 | SYSTOLIC BLOOD PRESSURE: 118 MMHG | TEMPERATURE: 97.1 F | HEIGHT: 65 IN | DIASTOLIC BLOOD PRESSURE: 66 MMHG | OXYGEN SATURATION: 99 % | WEIGHT: 122 LBS

## 2021-11-17 DIAGNOSIS — S89.91XA INJURY OF RIGHT KNEE, INITIAL ENCOUNTER: ICD-10-CM

## 2021-11-17 DIAGNOSIS — Z23 NEED FOR IMMUNIZATION AGAINST INFLUENZA: ICD-10-CM

## 2021-11-17 DIAGNOSIS — J45.909 UNCOMPLICATED ASTHMA, UNSPECIFIED ASTHMA SEVERITY, UNSPECIFIED WHETHER PERSISTENT: Primary | ICD-10-CM

## 2021-11-17 PROCEDURE — 99213 OFFICE O/P EST LOW 20 MIN: CPT | Performed by: FAMILY MEDICINE

## 2021-11-17 PROCEDURE — 90662 IIV NO PRSV INCREASED AG IM: CPT

## 2021-11-17 PROCEDURE — G0008 ADMIN INFLUENZA VIRUS VAC: HCPCS

## 2021-11-17 RX ORDER — PREDNISONE 10 MG/1
TABLET ORAL
Qty: 60 TABLET | Refills: 0 | Status: SHIPPED | OUTPATIENT
Start: 2021-11-17 | End: 2022-05-18 | Stop reason: SDUPTHER

## 2022-05-18 ENCOUNTER — OFFICE VISIT (OUTPATIENT)
Dept: FAMILY MEDICINE CLINIC | Facility: CLINIC | Age: 72
End: 2022-05-18
Payer: MEDICARE

## 2022-05-18 VITALS
SYSTOLIC BLOOD PRESSURE: 118 MMHG | OXYGEN SATURATION: 98 % | WEIGHT: 125 LBS | TEMPERATURE: 97.6 F | BODY MASS INDEX: 20.83 KG/M2 | DIASTOLIC BLOOD PRESSURE: 64 MMHG | HEART RATE: 90 BPM | RESPIRATION RATE: 16 BRPM | HEIGHT: 65 IN

## 2022-05-18 DIAGNOSIS — F33.41 RECURRENT MAJOR DEPRESSIVE DISORDER, IN PARTIAL REMISSION (HCC): ICD-10-CM

## 2022-05-18 DIAGNOSIS — J45.909 UNCOMPLICATED ASTHMA, UNSPECIFIED ASTHMA SEVERITY, UNSPECIFIED WHETHER PERSISTENT: ICD-10-CM

## 2022-05-18 DIAGNOSIS — H11.32 SUBCONJUNCTIVAL HEMORRHAGE OF LEFT EYE: ICD-10-CM

## 2022-05-18 DIAGNOSIS — F32.A DEPRESSION, UNSPECIFIED DEPRESSION TYPE: ICD-10-CM

## 2022-05-18 DIAGNOSIS — E55.9 VITAMIN D DEFICIENCY: ICD-10-CM

## 2022-05-18 DIAGNOSIS — E53.8 B12 DEFICIENCY: Primary | ICD-10-CM

## 2022-05-18 DIAGNOSIS — S89.91XA INJURY OF RIGHT KNEE, INITIAL ENCOUNTER: ICD-10-CM

## 2022-05-18 DIAGNOSIS — E46 PROTEIN-CALORIE MALNUTRITION, UNSPECIFIED SEVERITY (HCC): ICD-10-CM

## 2022-05-18 DIAGNOSIS — Z01.419 ENCOUNTER FOR ANNUAL ROUTINE GYNECOLOGICAL EXAMINATION: ICD-10-CM

## 2022-05-18 DIAGNOSIS — L30.9 DERMATITIS: ICD-10-CM

## 2022-05-18 PROCEDURE — 99214 OFFICE O/P EST MOD 30 MIN: CPT | Performed by: FAMILY MEDICINE

## 2022-05-18 RX ORDER — CYANOCOBALAMIN 1000 UG/ML
1000 INJECTION INTRAMUSCULAR; SUBCUTANEOUS
Status: DISCONTINUED | OUTPATIENT
Start: 2022-05-18 | End: 2022-06-29

## 2022-05-18 RX ORDER — PREDNISONE 10 MG/1
TABLET ORAL
Qty: 60 TABLET | Refills: 0 | Status: SHIPPED | OUTPATIENT
Start: 2022-05-18 | End: 2022-06-30 | Stop reason: SDUPTHER

## 2022-05-18 RX ORDER — FLUTICASONE PROPIONATE 50 MCG
2 SPRAY, SUSPENSION (ML) NASAL DAILY
Qty: 16 G | Refills: 0 | Status: SHIPPED | OUTPATIENT
Start: 2022-05-18 | End: 2022-06-14

## 2022-05-18 RX ORDER — TRIAMCINOLONE ACETONIDE 1 MG/G
CREAM TOPICAL 2 TIMES DAILY
Qty: 30 G | Refills: 0 | Status: SHIPPED | OUTPATIENT
Start: 2022-05-18

## 2022-05-18 RX ORDER — KETOTIFEN FUMARATE 0.35 MG/ML
1 SOLUTION/ DROPS OPHTHALMIC 2 TIMES DAILY PRN
Qty: 5 ML | Refills: 2 | Status: SHIPPED | OUTPATIENT
Start: 2022-05-18

## 2022-05-18 RX ORDER — ALBUTEROL SULFATE 90 UG/1
1 AEROSOL, METERED RESPIRATORY (INHALATION) AS NEEDED
Qty: 8.5 G | Refills: 2 | Status: SHIPPED | OUTPATIENT
Start: 2022-05-18

## 2022-05-18 RX ADMIN — CYANOCOBALAMIN 1000 MCG: 1000 INJECTION INTRAMUSCULAR; SUBCUTANEOUS at 14:28

## 2022-05-20 NOTE — PROGRESS NOTES
Assessment/Plan:    51-year-old woman with:  B12 deficiency dermatitis major depression vitamin-D deficiency right knee injury along with protein calorie malnutrition  Discussed workup and treatment options with risks and benefits will continue current medications discussed supportive care return parameters follow-up in 6 weeks    No problem-specific Assessment & Plan notes found for this encounter  Diagnoses and all orders for this visit:    B12 deficiency  -     cyanocobalamin injection 1,000 mcg    Dermatitis  -     triamcinolone (KENALOG) 0 1 % cream; Apply topically 2 (two) times a day  -     CBC and differential; Future  -     Comprehensive metabolic panel; Future  -     TSH, 3rd generation with Free T4 reflex; Future  -     Lipid Panel with Direct LDL reflex; Future  -     Vitamin D 25 hydroxy; Future  -     fluticasone (FLONASE) 50 mcg/act nasal spray; 2 sprays into each nostril in the morning  Uncomplicated asthma, unspecified asthma severity, unspecified whether persistent  -     albuterol (PROVENTIL HFA,VENTOLIN HFA) 90 mcg/act inhaler; Inhale 1 puff  as needed in the morning for wheezing   -     predniSONE 10 mg tablet; Take 4 tabs daily x4d, then 3x4d, then 2x4d, then 1x4d, then 0 5x4d then stop  -     CBC and differential; Future  -     Comprehensive metabolic panel; Future  -     TSH, 3rd generation with Free T4 reflex; Future  -     Lipid Panel with Direct LDL reflex; Future  -     Vitamin D 25 hydroxy; Future  -     fluticasone (FLONASE) 50 mcg/act nasal spray; 2 sprays into each nostril in the morning  Subconjunctival hemorrhage of left eye  -     ketotifen (ZADITOR) 0 025 % ophthalmic solution; Administer 1 drop to both eyes as needed in the morning and 1 drop as needed in the evening (eye itching or discomfort)  -     CBC and differential; Future  -     Comprehensive metabolic panel; Future  -     TSH, 3rd generation with Free T4 reflex;  Future  -     Lipid Panel with Direct LDL reflex; Future  -     Vitamin D 25 hydroxy; Future  -     fluticasone (FLONASE) 50 mcg/act nasal spray; 2 sprays into each nostril in the morning  Protein-calorie malnutrition, unspecified severity (HCC)  -     CBC and differential; Future  -     Comprehensive metabolic panel; Future  -     TSH, 3rd generation with Free T4 reflex; Future  -     Lipid Panel with Direct LDL reflex; Future  -     Vitamin D 25 hydroxy; Future  -     fluticasone (FLONASE) 50 mcg/act nasal spray; 2 sprays into each nostril in the morning  Recurrent major depressive disorder, in partial remission (HCC)  -     CBC and differential; Future  -     Comprehensive metabolic panel; Future  -     TSH, 3rd generation with Free T4 reflex; Future  -     Lipid Panel with Direct LDL reflex; Future  -     Vitamin D 25 hydroxy; Future  -     fluticasone (FLONASE) 50 mcg/act nasal spray; 2 sprays into each nostril in the morning  Injury of right knee, initial encounter  -     predniSONE 10 mg tablet; Take 4 tabs daily x4d, then 3x4d, then 2x4d, then 1x4d, then 0 5x4d then stop  -     CBC and differential; Future  -     Comprehensive metabolic panel; Future  -     TSH, 3rd generation with Free T4 reflex; Future  -     Lipid Panel with Direct LDL reflex; Future  -     Vitamin D 25 hydroxy; Future  -     fluticasone (FLONASE) 50 mcg/act nasal spray; 2 sprays into each nostril in the morning  Depression, unspecified depression type  -     CBC and differential; Future  -     Comprehensive metabolic panel; Future  -     TSH, 3rd generation with Free T4 reflex; Future  -     Lipid Panel with Direct LDL reflex; Future  -     Vitamin D 25 hydroxy; Future  -     fluticasone (FLONASE) 50 mcg/act nasal spray; 2 sprays into each nostril in the morning  Vitamin D deficiency  -     CBC and differential; Future  -     Comprehensive metabolic panel; Future  -     TSH, 3rd generation with Free T4 reflex;  Future  -     Lipid Panel with Direct LDL reflex; Future  -     Vitamin D 25 hydroxy; Future  -     fluticasone (FLONASE) 50 mcg/act nasal spray; 2 sprays into each nostril in the morning  Encounter for annual routine gynecological examination  -     Ambulatory Referral to Gynecology; Future          Subjective:     Chief Complaint   Patient presents with    Follow-up     6 month follow up        Patient ID: Tesha Angulo is a 81 y o  female  Patient is a 74-year-old woman who presents for follow-up on B12 deficiency dermatitis major depression vitamin-D deficiency right knee injury along with protein calorie malnutrition patient admits being stable medications although she does have some breakthrough pain would like some prednisone no fevers chills nausea vomiting tolerating p o  intake no other complaints at this time      The following portions of the patient's history were reviewed and updated as appropriate: allergies, current medications, past family history, past medical history, past social history, past surgical history and problem list     Review of Systems   Constitutional: Negative  HENT: Negative  Eyes: Negative  Respiratory: Negative  Cardiovascular: Negative  Gastrointestinal: Negative  Endocrine: Negative  Genitourinary: Negative  Musculoskeletal: Negative  Allergic/Immunologic: Negative  Neurological: Negative  Hematological: Negative  Psychiatric/Behavioral: Negative  All other systems reviewed and are negative  Objective:      /64 (BP Location: Right arm, Patient Position: Sitting, Cuff Size: Adult)   Pulse 90   Temp 97 6 °F (36 4 °C) (Tympanic)   Resp 16   Ht 5' 5" (1 651 m)   Wt 56 7 kg (125 lb)   LMP  (LMP Unknown)   SpO2 98%   BMI 20 80 kg/m²          Physical Exam  Constitutional:       Appearance: She is well-developed  HENT:      Head: Atraumatic        Right Ear: External ear normal       Left Ear: External ear normal    Eyes:      Conjunctiva/sclera: Conjunctivae normal  Pupils: Pupils are equal, round, and reactive to light  Cardiovascular:      Rate and Rhythm: Normal rate and regular rhythm  Heart sounds: Normal heart sounds  Pulmonary:      Effort: Pulmonary effort is normal  No respiratory distress  Breath sounds: Normal breath sounds  Abdominal:      General: There is no distension  Palpations: Abdomen is soft  Tenderness: There is no abdominal tenderness  There is no guarding or rebound  Musculoskeletal:         General: Normal range of motion  Cervical back: Normal range of motion  Skin:     General: Skin is warm and dry  Neurological:      Mental Status: She is alert and oriented to person, place, and time  Cranial Nerves: No cranial nerve deficit  Psychiatric:         Behavior: Behavior normal          Thought Content:  Thought content normal          Judgment: Judgment normal

## 2022-05-24 ENCOUNTER — TELEPHONE (OUTPATIENT)
Dept: OBGYN CLINIC | Facility: CLINIC | Age: 72
End: 2022-05-24

## 2022-05-24 NOTE — TELEPHONE ENCOUNTER
LM for patient to call us to schedule routine GYN exam  She was referred to us by her PCP, Dr Douglas Russell

## 2022-06-14 DIAGNOSIS — S89.91XA INJURY OF RIGHT KNEE, INITIAL ENCOUNTER: ICD-10-CM

## 2022-06-14 DIAGNOSIS — E46 PROTEIN-CALORIE MALNUTRITION, UNSPECIFIED SEVERITY (HCC): ICD-10-CM

## 2022-06-14 DIAGNOSIS — E55.9 VITAMIN D DEFICIENCY: ICD-10-CM

## 2022-06-14 DIAGNOSIS — F33.41 RECURRENT MAJOR DEPRESSIVE DISORDER, IN PARTIAL REMISSION (HCC): ICD-10-CM

## 2022-06-14 DIAGNOSIS — H11.32 SUBCONJUNCTIVAL HEMORRHAGE OF LEFT EYE: ICD-10-CM

## 2022-06-14 DIAGNOSIS — F32.A DEPRESSION, UNSPECIFIED DEPRESSION TYPE: ICD-10-CM

## 2022-06-14 DIAGNOSIS — J45.909 UNCOMPLICATED ASTHMA, UNSPECIFIED ASTHMA SEVERITY, UNSPECIFIED WHETHER PERSISTENT: ICD-10-CM

## 2022-06-14 DIAGNOSIS — L30.9 DERMATITIS: ICD-10-CM

## 2022-06-14 RX ORDER — FLUTICASONE PROPIONATE 50 MCG
2 SPRAY, SUSPENSION (ML) NASAL DAILY
Qty: 16 ML | Refills: 1 | Status: SHIPPED | OUTPATIENT
Start: 2022-06-14

## 2022-06-23 ENCOUNTER — CLINICAL SUPPORT (OUTPATIENT)
Dept: FAMILY MEDICINE CLINIC | Facility: CLINIC | Age: 72
End: 2022-06-23
Payer: MEDICARE

## 2022-06-23 VITALS
BODY MASS INDEX: 20.3 KG/M2 | TEMPERATURE: 98.8 F | OXYGEN SATURATION: 98 % | DIASTOLIC BLOOD PRESSURE: 70 MMHG | WEIGHT: 122 LBS | HEART RATE: 88 BPM | SYSTOLIC BLOOD PRESSURE: 110 MMHG

## 2022-06-23 DIAGNOSIS — R53.83 FATIGUE, UNSPECIFIED TYPE: Primary | ICD-10-CM

## 2022-06-23 PROCEDURE — 96372 THER/PROPH/DIAG INJ SC/IM: CPT

## 2022-06-23 RX ORDER — CYANOCOBALAMIN 1000 UG/ML
1000 INJECTION INTRAMUSCULAR; SUBCUTANEOUS
Status: DISCONTINUED | OUTPATIENT
Start: 2022-06-23 | End: 2022-06-29

## 2022-06-23 RX ADMIN — CYANOCOBALAMIN 1000 MCG: 1000 INJECTION INTRAMUSCULAR; SUBCUTANEOUS at 11:06

## 2022-06-30 ENCOUNTER — OFFICE VISIT (OUTPATIENT)
Dept: FAMILY MEDICINE CLINIC | Facility: CLINIC | Age: 72
End: 2022-06-30
Payer: MEDICARE

## 2022-06-30 VITALS
RESPIRATION RATE: 20 BRPM | TEMPERATURE: 98.5 F | HEART RATE: 85 BPM | BODY MASS INDEX: 20.56 KG/M2 | DIASTOLIC BLOOD PRESSURE: 70 MMHG | SYSTOLIC BLOOD PRESSURE: 110 MMHG | OXYGEN SATURATION: 96 % | HEIGHT: 65 IN | WEIGHT: 123.4 LBS

## 2022-06-30 DIAGNOSIS — J45.909 UNCOMPLICATED ASTHMA, UNSPECIFIED ASTHMA SEVERITY, UNSPECIFIED WHETHER PERSISTENT: ICD-10-CM

## 2022-06-30 DIAGNOSIS — E55.9 VITAMIN D DEFICIENCY: ICD-10-CM

## 2022-06-30 DIAGNOSIS — E64.0 SEQUELAE OF PROTEIN-CALORIE MALNUTRITION (HCC): ICD-10-CM

## 2022-06-30 DIAGNOSIS — Z12.31 ENCOUNTER FOR SCREENING MAMMOGRAM FOR MALIGNANT NEOPLASM OF BREAST: ICD-10-CM

## 2022-06-30 DIAGNOSIS — F33.41 RECURRENT MAJOR DEPRESSIVE DISORDER, IN PARTIAL REMISSION (HCC): ICD-10-CM

## 2022-06-30 DIAGNOSIS — Z00.00 MEDICARE ANNUAL WELLNESS VISIT, SUBSEQUENT: ICD-10-CM

## 2022-06-30 DIAGNOSIS — R23.3 PETECHIAE: Primary | ICD-10-CM

## 2022-06-30 DIAGNOSIS — S89.91XA INJURY OF RIGHT KNEE, INITIAL ENCOUNTER: ICD-10-CM

## 2022-06-30 PROCEDURE — 1123F ACP DISCUSS/DSCN MKR DOCD: CPT | Performed by: FAMILY MEDICINE

## 2022-06-30 PROCEDURE — 99214 OFFICE O/P EST MOD 30 MIN: CPT | Performed by: FAMILY MEDICINE

## 2022-06-30 PROCEDURE — G0439 PPPS, SUBSEQ VISIT: HCPCS | Performed by: FAMILY MEDICINE

## 2022-06-30 RX ORDER — PREDNISONE 10 MG/1
TABLET ORAL
Qty: 60 TABLET | Refills: 0 | Status: SHIPPED | OUTPATIENT
Start: 2022-06-30

## 2022-06-30 RX ORDER — IBUPROFEN 200 MG
TABLET ORAL EVERY 6 HOURS PRN
COMMUNITY

## 2022-06-30 NOTE — PROGRESS NOTES
Assessment and Plan:     Problem List Items Addressed This Visit        Respiratory    Asthma    Relevant Medications    predniSONE 10 mg tablet    Other Relevant Orders    Blood Parasite smear       Other    Recurrent major depressive disorder, in partial remission (HCC)    Relevant Orders    CBC and differential (Completed)    Comprehensive metabolic panel (Completed)    TSH, 3rd generation with Free T4 reflex (Completed)    Lipid Panel with Direct LDL reflex (Completed)    C-reactive protein (Completed)    ESTEFANIA w/Reflex if Positive    Protime-INR (Completed)    APTT (Completed)    Blood Parasite smear    Vitamin D deficiency    Relevant Orders    CBC and differential (Completed)    Comprehensive metabolic panel (Completed)    TSH, 3rd generation with Free T4 reflex (Completed)    Lipid Panel with Direct LDL reflex (Completed)    C-reactive protein (Completed)    ESTEFANIA w/Reflex if Positive    Protime-INR (Completed)    APTT (Completed)    Blood Parasite smear    Medicare annual wellness visit, subsequent    Right knee injury    Relevant Medications    predniSONE 10 mg tablet    Other Relevant Orders    Blood Parasite smear    Petechiae - Primary    Relevant Orders    CBC and differential (Completed)    Comprehensive metabolic panel (Completed)    TSH, 3rd generation with Free T4 reflex (Completed)    Lipid Panel with Direct LDL reflex (Completed)    C-reactive protein (Completed)    ESTEFANIA w/Reflex if Positive    Protime-INR (Completed)    APTT (Completed)    Blood Parasite smear      Other Visit Diagnoses     Sequelae of protein-calorie malnutrition (HCC)         Relevant Orders    Lipid Panel with Direct LDL reflex (Completed)    Blood Parasite smear    Encounter for screening mammogram for malignant neoplasm of breast        Relevant Orders    Mammo screening bilateral w 3d & cad    Blood Parasite smear           Preventive health issues were discussed with patient, and age appropriate screening tests were ordered as noted in patient's After Visit Summary  Personalized health advice and appropriate referrals for health education or preventive services given if needed, as noted in patient's After Visit Summary  History of Present Illness:     Patient presents for a Medicare Wellness Visit    HPI   Patient Care Team:  Manuela Orellana MD as PCP - General  DO [facility] [place] (Vascular Surgery)  Curtis Ray MD (Vascular Surgery)     Review of Systems:     Review of Systems   Constitutional: Negative  HENT: Negative  Eyes: Negative  Respiratory: Negative  Cardiovascular: Negative  Gastrointestinal: Negative  Endocrine: Negative  Genitourinary: Negative  Musculoskeletal: Positive for arthralgias and myalgias  Allergic/Immunologic: Negative  Neurological: Negative  Hematological: Negative  Psychiatric/Behavioral: Negative  All other systems reviewed and are negative         Problem List:     Patient Active Problem List   Diagnosis    Recurrent major depressive disorder, in partial remission (HCC)    Pleural thickening    Symptomatic varicose veins of both lower extremities    Vitamin D deficiency    Changing skin lesion    Asthma    Other acute recurrent sinusitis    Dermatitis    Superficial phlebitis of leg, right    Medicare annual wellness visit, subsequent    Left knee injury    Left shoulder pain    Cerumen impaction    Depression    Right foot pain    RLQ abdominal tenderness    Bug bite    Protein-calorie malnutrition ([address] )    Wynne's neuroma of right foot    Chronic pain of right ankle    Right knee injury    Abnormal EKG    Preoperative cardiovascular examination    Petechiae      Past Medical and Surgical History:     Past Medical History:   Diagnosis Date    Allergic 1999    penicillin,ragweed,cat,dog,mold,grass,moderate soy    Anxiety     Asthma     Allergy Induced    Breast cancer ([address] ) 2003    left breast phyllodes    Cancer [address] [address] )     Left Breast CA    Palpitations      Past Surgical History:   Procedure Laterality Date    BREAST BIOPSY Right 2005    benign    BREAST LUMPECTOMY Left 2003    phyllodes    COLONOSCOPY      PA ENDOVENOUS LASER, 1ST VEIN Right 2021    Procedure: ENDOVASCULAR LASER THERAPY (EVLT)W/ PHLEBECTOMY x19;  Surgeon: Cecy Zapata MD;  Location: [facility];  Service: Vascular    TONSILLECTOMY      WISDOM TOOTH EXTRACTION        Family History:     Family History   Problem Relation Age of Onset    Uterine cancer Mother     Varicose Veins Mother     Hypertension Mother     Dysfunctional uterine bleeding Mother     Endometrial cancer Mother 80    COPD Father     Heart disease Father         Cardiac disorder    COPD Sister     Lung cancer Sister 72    Heart attack Brother     No Known Problems Maternal Grandmother     No Known Problems Maternal Grandfather     No Known Problems Paternal Grandmother     No Known Problems Paternal Grandfather     Diabetes Family       Social History:     Social History     Socioeconomic History    Marital status: Single     Spouse name: None    Number of children: None    Years of education: None    Highest education level: Some college, no degree   Occupational History    Occupation: retired   [employer] service   Tobacco Use    Smoking status: Former Smoker     Packs/day: 0 25     Years: 20 00     Pack years: 5 00     Quit date:      Years since quittin 5    Smokeless tobacco: Never Used   Vaping Use    Vaping Use: Never used   Substance and Sexual Activity    Alcohol use: Not Currently     Alcohol/week: 1 0 standard drink     Types: 1 Cans of beer per week     Comment: rarely    Drug use: No    Sexual activity: Not Currently   Other Topics Concern    None   Social History Narrative    Always uses seatbelt        Caffeine use consumes 2 cups of coffee per day         Social Determinants of Health     Financial Resource Strain: Not on file   Food Insecurity: Not on file   Transportation Needs: Not on file   Physical Activity: Not on file   Stress: Not on file   Social Connections: Not on file   Intimate Partner Violence: Not on file   Housing Stability: Not on file      Medications and Allergies:     Current Outpatient Medications   Medication Sig Dispense Refill    albuterol (PROVENTIL HFA,VENTOLIN HFA) 90 mcg/act inhaler Inhale 1 puff  as needed in the morning for wheezing  8 5 g 2    fluticasone (FLONASE) 50 mcg/act nasal spray 2 SPRAYS INTO EACH NOSTRIL IN THE MORNING  16 mL 1    ibuprofen (MOTRIN) 200 mg tablet Take by mouth every 6 (six) hours as needed for mild pain      ketotifen (ZADITOR) 0 025 % ophthalmic solution Administer 1 drop to both eyes as needed in the morning and 1 drop as needed in the evening (eye itching or discomfort)  5 mL 2    loratadine (Alavert) 10 MG dissolvable tablet Take 10 mg by mouth as needed       Polyethylene Glycol 3350 (MIRALAX PO) Take by mouth as needed       predniSONE 10 mg tablet Take 4 tabs daily x4d, then 3x4d, then 2x4d, then 1x4d, then 0 5x4d then stop 60 tablet 0    triamcinolone (KENALOG) 0 1 % cream Apply topically 2 (two) times a day 30 g 0    acetaminophen (TYLENOL) 325 mg tablet Take 650 mg by mouth every 6 (six) hours as needed for mild pain  (Patient not taking: Reported on 6/30/2022)      EPINEPHrine (EPIPEN) 0 3 mg/0 3 mL SOAJ Inject 0 3 mL (0 3 mg total) into a muscle once for 1 dose 0 6 mL 0     No current facility-administered medications for this visit       Allergies   Allergen Reactions    Gabapentin Tinnitus     Side of head numb    Wasp Venom Protein Hives    Ambrosia Artemisiifolia (Ragweed) Skin Test Allergic Rhinitis    Cat Hair Extract Allergic Rhinitis    Codeine Rash     As child, tolerated as adult      Mold Extract [Trichophyton] Allergic Rhinitis    Penicillins Rash    Soy Isoflavones Rash      Immunizations:     Immunization History Administered Date(s) Administered    COVID-19 MODERNA VACC 0 5 ML IM 03/17/2021, 04/14/2021, 12/10/2021    INFLUENZA 11/20/2012, 11/15/2013, 10/20/2014, 10/03/2017, 11/17/2021    Influenza, high dose seasonal 0 7 mL 10/16/2019, 11/18/2020, 11/17/2021    Influenza, seasonal, injectable 11/02/2020    Tdap 11/11/2009, 08/22/2016      Health Maintenance:         Topic Date Due    Breast Cancer Screening: Mammogram  05/21/2023    Colorectal Cancer Screening  06/14/2023    Hepatitis C Screening  Completed         Topic Date Due    Pneumococcal Vaccine: 65+ Years (1 - PCV) Never done    COVID-19 Vaccine (4 - Booster for Moderna series) 04/10/2022    Influenza Vaccine (1) 09/01/2022      Medicare Screening Tests and Risk Assessments:     Melina Santos is here for her Subsequent Wellness visit  Last Medicare Wellness visit information reviewed, patient interviewed and updates made to the record today  Health Risk Assessment:   Patient rates overall health as very good  Patient feels that their physical health rating is same  Patient is satisfied with their life  Eyesight was rated as same  Hearing was rated as same  Patient feels that their emotional and mental health rating is same  Patients states they are never, rarely angry  Patient states they are sometimes unusually tired/fatigued  Pain experienced in the last 7 days has been some  Patient's pain rating has been 8/10  Patient states that she has experienced no weight loss or gain in last 6 months  Depression Screening:   PHQ-9 Score: 1      Fall Risk Screening: In the past year, patient has experienced: no history of falling in past year      Urinary Incontinence Screening:   Patient has not leaked urine accidently in the last six months  Home Safety:  Patient does not have trouble with stairs inside or outside of their home  Patient has working smoke alarms and has working carbon monoxide detector  Home safety hazards include: none  Nutrition:   Current diet is Regular  Medications:   Patient is currently taking over-the-counter supplements  OTC medications include: see medication list  Patient is able to manage medications  Activities of Daily Living (ADLs)/Instrumental Activities of Daily Living (IADLs):   Walk and transfer into and out of bed and chair?: Yes  Dress and groom yourself?: Yes    Bathe or shower yourself?: Yes    Feed yourself? Yes  Do your laundry/housekeeping?: Yes  Manage your money, pay your bills and track your expenses?: Yes  Make your own meals?: Yes    Do your own shopping?: Yes    Previous Hospitalizations:   Any hospitalizations or ED visits within the last 12 months?: No      Advance Care Planning:   Living will: Yes    Durable POA for healthcare:  Yes    Advanced directive: Yes      Cognitive Screening:   Provider or family/friend/caregiver concerned regarding cognition?: No    PREVENTIVE SCREENINGS      Cardiovascular Screening:    General: Screening Not Indicated and History Lipid Disorder      Diabetes Screening:     General: Risks and Benefits Discussed    Due for: Blood Glucose      Colorectal Cancer Screening:     General: Screening Current      Breast Cancer Screening:     General: History Breast Cancer and Risks and Benefits Discussed    Due for: Mammogram        Cervical Cancer Screening:    General: Screening Not Indicated      Osteoporosis Screening:    General: Risks and Benefits Discussed      Abdominal Aortic Aneurysm (AAA) Screening:        General: Screening Not Indicated      Lung Cancer Screening:     General: Screening Not Indicated      Hepatitis C Screening:    General: Screening Current    No exam data present     Physical Exam:     /70 (BP Location: Left arm, Patient Position: Sitting, Cuff Size: Standard)   Pulse 85   Temp 98 5 °F (36 9 °C) (Tympanic)   Resp 20   Ht 5' 5" (1 651 m)   Wt 56 kg (123 lb 6 4 oz)   LMP  (LMP Unknown)   SpO2 96%   BMI 20 53 kg/m² Physical Exam  Constitutional:       General: She is not in acute distress  Appearance: She is well-developed  She is not diaphoretic  HENT:      Head: Normocephalic and atraumatic  Right Ear: External ear normal       Left Ear: External ear normal       Nose: Nose normal       Mouth/Throat:      Pharynx: No oropharyngeal exudate  Eyes:      General:         Right eye: No discharge  Left eye: No discharge  Conjunctiva/sclera: Conjunctivae normal       Pupils: Pupils are equal, round, and reactive to light  Neck:      Thyroid: No thyromegaly  Trachea: No tracheal deviation  Cardiovascular:      Rate and Rhythm: Normal rate and regular rhythm  Heart sounds: Normal heart sounds  No murmur heard  No friction rub  No gallop  Pulmonary:      Effort: Pulmonary effort is normal  No respiratory distress  Breath sounds: Normal breath sounds  Abdominal:      General: There is no distension  Palpations: Abdomen is soft  Tenderness: There is no abdominal tenderness  There is no guarding or rebound  Musculoskeletal:         General: Normal range of motion  Lymphadenopathy:      Cervical: No cervical adenopathy  Skin:     General: Skin is warm  Neurological:      Mental Status: She is alert and oriented to person, place, and time  Cranial Nerves: No cranial nerve deficit  Psychiatric:         Behavior: Behavior normal          Thought Content:  Thought content normal          Judgment: Judgment normal           Artem Mcnair MD

## 2022-07-01 ENCOUNTER — APPOINTMENT (OUTPATIENT)
Dept: LAB | Facility: HOSPITAL | Age: 72
End: 2022-07-01
Payer: MEDICARE

## 2022-07-01 DIAGNOSIS — J45.909 UNCOMPLICATED ASTHMA, UNSPECIFIED ASTHMA SEVERITY, UNSPECIFIED WHETHER PERSISTENT: ICD-10-CM

## 2022-07-01 DIAGNOSIS — F32.A DEPRESSION, UNSPECIFIED DEPRESSION TYPE: ICD-10-CM

## 2022-07-01 DIAGNOSIS — Z12.31 ENCOUNTER FOR SCREENING MAMMOGRAM FOR MALIGNANT NEOPLASM OF BREAST: ICD-10-CM

## 2022-07-01 DIAGNOSIS — L30.9 DERMATITIS: ICD-10-CM

## 2022-07-01 DIAGNOSIS — H11.32 SUBCONJUNCTIVAL HEMORRHAGE OF LEFT EYE: ICD-10-CM

## 2022-07-01 DIAGNOSIS — E55.9 VITAMIN D DEFICIENCY: ICD-10-CM

## 2022-07-01 DIAGNOSIS — F33.41 RECURRENT MAJOR DEPRESSIVE DISORDER, IN PARTIAL REMISSION (HCC): ICD-10-CM

## 2022-07-01 DIAGNOSIS — R23.3 PETECHIAE: ICD-10-CM

## 2022-07-01 DIAGNOSIS — S89.91XA INJURY OF RIGHT KNEE, INITIAL ENCOUNTER: ICD-10-CM

## 2022-07-01 DIAGNOSIS — E64.0 SEQUELAE OF PROTEIN-CALORIE MALNUTRITION (HCC): ICD-10-CM

## 2022-07-01 DIAGNOSIS — E46 PROTEIN-CALORIE MALNUTRITION, UNSPECIFIED SEVERITY (HCC): ICD-10-CM

## 2022-07-01 LAB
25(OH)D3 SERPL-MCNC: 32.2 NG/ML (ref 30–100)
ALBUMIN SERPL BCP-MCNC: 3.6 G/DL (ref 3.5–5)
ALP SERPL-CCNC: 81 U/L (ref 46–116)
ALT SERPL W P-5'-P-CCNC: 22 U/L (ref 12–78)
ANION GAP SERPL CALCULATED.3IONS-SCNC: 10 MMOL/L (ref 4–13)
APTT PPP: 28 SECONDS (ref 23–37)
AST SERPL W P-5'-P-CCNC: 20 U/L (ref 5–45)
BASOPHILS # BLD AUTO: 0.1 THOUSANDS/ΜL (ref 0–0.1)
BASOPHILS NFR BLD AUTO: 1 % (ref 0–1)
BILIRUB SERPL-MCNC: 0.41 MG/DL (ref 0.2–1)
BUN SERPL-MCNC: 9 MG/DL (ref 5–25)
CALCIUM SERPL-MCNC: 8.6 MG/DL (ref 8.3–10.1)
CHLORIDE SERPL-SCNC: 103 MMOL/L (ref 100–108)
CHOLEST SERPL-MCNC: 224 MG/DL
CO2 SERPL-SCNC: 27 MMOL/L (ref 21–32)
CREAT SERPL-MCNC: 0.69 MG/DL (ref 0.6–1.3)
CRP SERPL QL: <3 MG/L
EOSINOPHIL # BLD AUTO: 0.15 THOUSAND/ΜL (ref 0–0.61)
EOSINOPHIL NFR BLD AUTO: 2 % (ref 0–6)
ERYTHROCYTE [DISTWIDTH] IN BLOOD BY AUTOMATED COUNT: 13.3 % (ref 11.6–15.1)
GFR SERPL CREATININE-BSD FRML MDRD: 88 ML/MIN/1.73SQ M
GLUCOSE P FAST SERPL-MCNC: 82 MG/DL (ref 65–99)
HCT VFR BLD AUTO: 47.5 % (ref 34.8–46.1)
HDLC SERPL-MCNC: 94 MG/DL
HGB BLD-MCNC: 15.5 G/DL (ref 11.5–15.4)
IMM GRANULOCYTES # BLD AUTO: 0.02 THOUSAND/UL (ref 0–0.2)
IMM GRANULOCYTES NFR BLD AUTO: 0 % (ref 0–2)
INR PPP: 0.98 (ref 0.84–1.19)
LDLC SERPL CALC-MCNC: 116 MG/DL (ref 0–100)
LYMPHOCYTES # BLD AUTO: 1.79 THOUSANDS/ΜL (ref 0.6–4.47)
LYMPHOCYTES NFR BLD AUTO: 25 % (ref 14–44)
MCH RBC QN AUTO: 30.5 PG (ref 26.8–34.3)
MCHC RBC AUTO-ENTMCNC: 32.6 G/DL (ref 31.4–37.4)
MCV RBC AUTO: 93 FL (ref 82–98)
MONOCYTES # BLD AUTO: 0.56 THOUSAND/ΜL (ref 0.17–1.22)
MONOCYTES NFR BLD AUTO: 8 % (ref 4–12)
NEUTROPHILS # BLD AUTO: 4.54 THOUSANDS/ΜL (ref 1.85–7.62)
NEUTS SEG NFR BLD AUTO: 64 % (ref 43–75)
NRBC BLD AUTO-RTO: 0 /100 WBCS
PLATELET # BLD AUTO: 209 THOUSANDS/UL (ref 149–390)
PMV BLD AUTO: 11.1 FL (ref 8.9–12.7)
POTASSIUM SERPL-SCNC: 4.3 MMOL/L (ref 3.5–5.3)
PROT SERPL-MCNC: 7.3 G/DL (ref 6.4–8.2)
PROTHROMBIN TIME: 12.8 SECONDS (ref 11.6–14.5)
RBC # BLD AUTO: 5.09 MILLION/UL (ref 3.81–5.12)
SODIUM SERPL-SCNC: 140 MMOL/L (ref 136–145)
TRIGL SERPL-MCNC: 70 MG/DL
TSH SERPL DL<=0.05 MIU/L-ACNC: 0.9 UIU/ML (ref 0.45–4.5)
WBC # BLD AUTO: 7.16 THOUSAND/UL (ref 4.31–10.16)

## 2022-07-01 PROCEDURE — 80061 LIPID PANEL: CPT

## 2022-07-01 PROCEDURE — 87207 SMEAR SPECIAL STAIN: CPT

## 2022-07-01 PROCEDURE — 85730 THROMBOPLASTIN TIME PARTIAL: CPT

## 2022-07-01 PROCEDURE — 86038 ANTINUCLEAR ANTIBODIES: CPT

## 2022-07-01 PROCEDURE — 85025 COMPLETE CBC W/AUTO DIFF WBC: CPT

## 2022-07-01 PROCEDURE — 80053 COMPREHEN METABOLIC PANEL: CPT

## 2022-07-01 PROCEDURE — 86140 C-REACTIVE PROTEIN: CPT

## 2022-07-01 PROCEDURE — 85610 PROTHROMBIN TIME: CPT

## 2022-07-01 PROCEDURE — 84443 ASSAY THYROID STIM HORMONE: CPT

## 2022-07-01 PROCEDURE — 36415 COLL VENOUS BLD VENIPUNCTURE: CPT

## 2022-07-01 PROCEDURE — 82306 VITAMIN D 25 HYDROXY: CPT

## 2022-07-05 LAB
% PARASITEMIA: 0
PARASITE BLD: NO
PATHOLOGIST INTERPRETATION: NORMAL
RYE IGE QN: NEGATIVE

## 2022-07-05 NOTE — PROGRESS NOTES
Assessment/Plan:    72-year-old woman with: chronic knee pain and arthritis, asthma, protein calorie malnutrition, vitamin-D deficiency, major depressive disorder  Discussed workup and treatment options with risks and benefits will continue current medications will give steroid taper per her request will check labs discussed supportive care return parameters otherwise advised to call back if not improving worsening    No problem-specific Assessment & Plan notes found for this encounter  Diagnoses and all orders for this visit:    Petechiae  -     CBC and differential; Future  -     Comprehensive metabolic panel; Future  -     TSH, 3rd generation with Free T4 reflex; Future  -     Lipid Panel with Direct LDL reflex; Future  -     C-reactive protein; Future  -     ESTEFANIA w/Reflex if Positive; Future  -     Protime-INR; Future  -     APTT; Future  -     Blood Parasite smear; Future    Recurrent major depressive disorder, in partial remission (HCC)  -     CBC and differential; Future  -     Comprehensive metabolic panel; Future  -     TSH, 3rd generation with Free T4 reflex; Future  -     Lipid Panel with Direct LDL reflex; Future  -     C-reactive protein; Future  -     ESTEFANIA w/Reflex if Positive; Future  -     Protime-INR; Future  -     APTT; Future  -     Blood Parasite smear; Future    Vitamin D deficiency  -     CBC and differential; Future  -     Comprehensive metabolic panel; Future  -     TSH, 3rd generation with Free T4 reflex; Future  -     Lipid Panel with Direct LDL reflex; Future  -     C-reactive protein; Future  -     ESTEFANIA w/Reflex if Positive; Future  -     Protime-INR; Future  -     APTT; Future  -     Blood Parasite smear; Future    Sequelae of protein-calorie malnutrition ([address] [address] )   -     Lipid Panel with Direct LDL reflex; Future  -     Blood Parasite smear; Future    Uncomplicated asthma, unspecified asthma severity, unspecified whether persistent  -     predniSONE 10 mg tablet;  Take 4 tabs daily x4d, then 3x4d, then 2x4d, then 1x4d, then 0 5x4d then stop  -     Blood Parasite smear; Future    Injury of right knee, initial encounter  -     predniSONE 10 mg tablet; Take 4 tabs daily x4d, then 3x4d, then 2x4d, then 1x4d, then 0 5x4d then stop  -     Blood Parasite smear; Future    Encounter for screening mammogram for malignant neoplasm of breast  -     Mammo screening bilateral w 3d & cad; Future  -     Blood Parasite smear; Future    Other orders  -     ibuprofen (MOTRIN) 200 mg tablet; Take by mouth every 6 (six) hours as needed for mild pain          Subjective:     Chief Complaint   Patient presents with    Medicare Wellness Visit     Also c/o rash        Patient ID: Merlyn Howard is a 81 y o  female  Patient is a 63-year-old woman who presents for follow-up on chronic knee pain and arthritis, asthma, protein calorie malnutrition, vitamin-D deficiency, major depressive disorder  Patient admits being stable on medications in general except that she is complaining of some breakthrough knee pain would like some more steroids she also admits that she is having rash on her legs no fevers chills nausea vomiting tolerating p o  intake no trauma no other complaints at this time      The following portions of the patient's history were reviewed and updated as appropriate: allergies, current medications, past family history, past medical history, past social history, past surgical history and problem list     Review of Systems   Constitutional: Negative  HENT: Negative  Eyes: Negative  Respiratory: Negative  Cardiovascular: Negative  Gastrointestinal: Negative  Endocrine: Negative  Genitourinary: Negative  Musculoskeletal: Positive for arthralgias and myalgias  Skin: Positive for rash  Allergic/Immunologic: Negative  Neurological: Negative  Hematological: Negative  Psychiatric/Behavioral: Negative  All other systems reviewed and are negative          Objective:      /70 (BP Location: Left arm, Patient Position: Sitting, Cuff Size: Standard)   Pulse 85   Temp 98 5 °F (36 9 °C) (Tympanic)   Resp 20   Ht 5' 5" (1 651 m)   Wt 56 kg (123 lb 6 4 oz)   LMP  (LMP Unknown)   SpO2 96%   BMI 20 53 kg/m²          Physical Exam  Constitutional:       Appearance: She is well-developed  HENT:      Head: Atraumatic  Right Ear: External ear normal       Left Ear: External ear normal    Eyes:      Conjunctiva/sclera: Conjunctivae normal       Pupils: Pupils are equal, round, and reactive to light  Cardiovascular:      Rate and Rhythm: Normal rate and regular rhythm  Heart sounds: Normal heart sounds  Pulmonary:      Effort: Pulmonary effort is normal  No respiratory distress  Breath sounds: Normal breath sounds  Abdominal:      General: There is no distension  Palpations: Abdomen is soft  Tenderness: There is no abdominal tenderness  There is no guarding or rebound  Musculoskeletal:         General: Normal range of motion  Cervical back: Normal range of motion  Skin:     General: Skin is warm and dry  Neurological:      Mental Status: She is alert and oriented to person, place, and time  Cranial Nerves: No cranial nerve deficit  Psychiatric:         Behavior: Behavior normal          Thought Content:  Thought content normal          Judgment: Judgment normal

## 2022-07-19 ENCOUNTER — RA CDI HCC (OUTPATIENT)
Dept: OTHER | Facility: HOSPITAL | Age: 72
End: 2022-07-19

## 2022-07-25 ENCOUNTER — CLINICAL SUPPORT (OUTPATIENT)
Dept: FAMILY MEDICINE CLINIC | Facility: CLINIC | Age: 72
End: 2022-07-25
Payer: MEDICARE

## 2022-07-25 DIAGNOSIS — E53.8 B12 DEFICIENCY: Primary | ICD-10-CM

## 2022-07-25 PROCEDURE — 96372 THER/PROPH/DIAG INJ SC/IM: CPT | Performed by: FAMILY MEDICINE

## 2022-07-25 RX ORDER — CYANOCOBALAMIN 1000 UG/ML
1000 INJECTION, SOLUTION INTRAMUSCULAR; SUBCUTANEOUS
Status: SHIPPED | OUTPATIENT
Start: 2022-07-25

## 2022-07-25 RX ADMIN — CYANOCOBALAMIN 1000 MCG: 1000 INJECTION, SOLUTION INTRAMUSCULAR; SUBCUTANEOUS at 09:03

## 2022-08-22 ENCOUNTER — CLINICAL SUPPORT (OUTPATIENT)
Dept: FAMILY MEDICINE CLINIC | Facility: CLINIC | Age: 72
End: 2022-08-22
Payer: MEDICARE

## 2022-08-22 DIAGNOSIS — E53.8 VITAMIN B12 DEFICIENCY: Primary | ICD-10-CM

## 2022-08-22 PROCEDURE — 96372 THER/PROPH/DIAG INJ SC/IM: CPT

## 2022-08-22 RX ADMIN — CYANOCOBALAMIN 1000 MCG: 1000 INJECTION, SOLUTION INTRAMUSCULAR; SUBCUTANEOUS at 08:05

## 2022-09-26 ENCOUNTER — CLINICAL SUPPORT (OUTPATIENT)
Dept: FAMILY MEDICINE CLINIC | Facility: CLINIC | Age: 72
End: 2022-09-26
Payer: MEDICARE

## 2022-09-26 DIAGNOSIS — E55.9 VITAMIN D DEFICIENCY: ICD-10-CM

## 2022-09-26 PROCEDURE — 96372 THER/PROPH/DIAG INJ SC/IM: CPT

## 2022-09-26 RX ADMIN — CYANOCOBALAMIN 1000 MCG: 1000 INJECTION, SOLUTION INTRAMUSCULAR; SUBCUTANEOUS at 08:08

## 2022-11-16 ENCOUNTER — OFFICE VISIT (OUTPATIENT)
Dept: FAMILY MEDICINE CLINIC | Facility: CLINIC | Age: 72
End: 2022-11-16

## 2022-11-16 VITALS
OXYGEN SATURATION: 94 % | BODY MASS INDEX: 21.06 KG/M2 | HEIGHT: 65 IN | SYSTOLIC BLOOD PRESSURE: 122 MMHG | TEMPERATURE: 97.5 F | HEART RATE: 94 BPM | DIASTOLIC BLOOD PRESSURE: 78 MMHG | WEIGHT: 126.4 LBS

## 2022-11-16 DIAGNOSIS — E53.8 B12 DEFICIENCY: Primary | ICD-10-CM

## 2022-11-16 DIAGNOSIS — E78.5 HYPERLIPIDEMIA, UNSPECIFIED HYPERLIPIDEMIA TYPE: ICD-10-CM

## 2022-11-16 DIAGNOSIS — S89.91XA INJURY OF RIGHT KNEE, INITIAL ENCOUNTER: ICD-10-CM

## 2022-11-16 DIAGNOSIS — Z23 NEED FOR IMMUNIZATION AGAINST INFLUENZA: ICD-10-CM

## 2022-11-16 RX ORDER — CYANOCOBALAMIN 1000 UG/ML
1000 INJECTION, SOLUTION INTRAMUSCULAR; SUBCUTANEOUS
Status: SHIPPED | OUTPATIENT
Start: 2022-11-16

## 2022-11-16 RX ORDER — PREDNISONE 10 MG/1
TABLET ORAL
Qty: 40 TABLET | Refills: 0 | Status: SHIPPED | OUTPATIENT
Start: 2022-11-16

## 2022-11-16 RX ADMIN — CYANOCOBALAMIN 1000 MCG: 1000 INJECTION, SOLUTION INTRAMUSCULAR; SUBCUTANEOUS at 14:45

## 2022-11-18 PROBLEM — E78.5 HYPERLIPIDEMIA: Status: ACTIVE | Noted: 2022-11-18

## 2022-11-18 PROBLEM — E53.8 B12 DEFICIENCY: Status: ACTIVE | Noted: 2022-11-18

## 2022-11-18 NOTE — PROGRESS NOTES
Assessment/Plan:    68-year-old woman with: knee pain hyperlipidemia along with B12 deficiency will give steroid taper will continue current medications discussed supportive care return parameters advised to call back if not improving worsening    No problem-specific Assessment & Plan notes found for this encounter  Diagnoses and all orders for this visit:    B12 deficiency  -     cyanocobalamin injection 1,000 mcg    Need for immunization against influenza  -     FLUZONE HIGH-DOSE: influenza vaccine, high-dose, preservative-free 0 7 mL    Injury of right knee, initial encounter  -     predniSONE 10 mg tablet; Take 4 tabs daily x 3d, then 3x3d, then 2x3d, 1x3d then 0 5x3d then stop  Hyperlipidemia, unspecified hyperlipidemia type  -     Lipid Panel with Direct LDL reflex; Future          Subjective:     Chief Complaint   Patient presents with   • Follow-up     6 month follow up        Patient ID: Harriett Sampson is a 81 y o  female  Patient is a 68-year-old woman who presents for follow-up on knee pain hyperlipidemia along with B12 deficiency no fevers chills nausea vomiting tolerating p o  intake she would like another steroid taper no other complaints at this      The following portions of the patient's history were reviewed and updated as appropriate: allergies, current medications, past family history, past medical history, past social history, past surgical history and problem list     Review of Systems   Constitutional: Negative  HENT: Negative  Eyes: Negative  Respiratory: Negative  Cardiovascular: Negative  Gastrointestinal: Negative  Endocrine: Negative  Genitourinary: Negative  Musculoskeletal: Positive for arthralgias and myalgias  Allergic/Immunologic: Negative  Neurological: Negative  Hematological: Negative  Psychiatric/Behavioral: Negative  All other systems reviewed and are negative          Objective:      /78 (BP Location: Left arm, Patient Position: Sitting, Cuff Size: Standard)   Pulse 94   Temp 97 5 °F (36 4 °C) (Tympanic)   Ht 5' 5" (1 651 m)   Wt 57 3 kg (126 lb 6 4 oz)   LMP  (LMP Unknown)   SpO2 94%   BMI 21 03 kg/m²          Physical Exam  Constitutional:       Appearance: She is well-developed and well-nourished  HENT:      Head: Atraumatic  Right Ear: External ear normal       Left Ear: External ear normal    Eyes:      Extraocular Movements: EOM normal       Conjunctiva/sclera: Conjunctivae normal       Pupils: Pupils are equal, round, and reactive to light  Cardiovascular:      Rate and Rhythm: Normal rate and regular rhythm  Heart sounds: Normal heart sounds  Pulmonary:      Effort: Pulmonary effort is normal  No respiratory distress  Breath sounds: Normal breath sounds  Abdominal:      General: There is no distension  Palpations: Abdomen is soft  Tenderness: There is no abdominal tenderness  There is no guarding or rebound  Musculoskeletal:         General: Normal range of motion  Cervical back: Normal range of motion  Skin:     General: Skin is warm and dry  Neurological:      Mental Status: She is alert and oriented to person, place, and time  Cranial Nerves: No cranial nerve deficit  Psychiatric:         Mood and Affect: Mood and affect normal          Behavior: Behavior normal          Thought Content:  Thought content normal          Judgment: Judgment normal

## 2023-04-05 ENCOUNTER — NURSE TRIAGE (OUTPATIENT)
Dept: OTHER | Facility: OTHER | Age: 73
End: 2023-04-05

## 2023-04-05 ENCOUNTER — OFFICE VISIT (OUTPATIENT)
Dept: FAMILY MEDICINE CLINIC | Facility: CLINIC | Age: 73
End: 2023-04-05

## 2023-04-05 ENCOUNTER — HOSPITAL ENCOUNTER (OUTPATIENT)
Dept: CT IMAGING | Facility: HOSPITAL | Age: 73
Discharge: HOME | End: 2023-04-05

## 2023-04-05 VITALS
WEIGHT: 119.8 LBS | OXYGEN SATURATION: 95 % | SYSTOLIC BLOOD PRESSURE: 110 MMHG | HEIGHT: 65 IN | TEMPERATURE: 98.5 F | HEART RATE: 92 BPM | DIASTOLIC BLOOD PRESSURE: 60 MMHG | BODY MASS INDEX: 19.96 KG/M2

## 2023-04-05 DIAGNOSIS — R50.9 FEVER, UNSPECIFIED FEVER CAUSE: ICD-10-CM

## 2023-04-05 DIAGNOSIS — R10.9 RIGHT FLANK PAIN: ICD-10-CM

## 2023-04-05 DIAGNOSIS — R50.9 FEVER, UNSPECIFIED FEVER CAUSE: Primary | ICD-10-CM

## 2023-04-05 DIAGNOSIS — R10.11 RUQ PAIN: ICD-10-CM

## 2023-04-05 DIAGNOSIS — E64.0 SEQUELAE OF PROTEIN-CALORIE MALNUTRITION (HCC): ICD-10-CM

## 2023-04-05 DIAGNOSIS — F33.41 RECURRENT MAJOR DEPRESSIVE DISORDER, IN PARTIAL REMISSION (HCC): ICD-10-CM

## 2023-04-05 DIAGNOSIS — R06.02 SOB (SHORTNESS OF BREATH): ICD-10-CM

## 2023-04-05 DIAGNOSIS — Z01.812 PRE-PROCEDURE LAB EXAM: Primary | ICD-10-CM

## 2023-04-05 DIAGNOSIS — J45.909 UNCOMPLICATED ASTHMA, UNSPECIFIED ASTHMA SEVERITY, UNSPECIFIED WHETHER PERSISTENT: ICD-10-CM

## 2023-04-05 RX ORDER — PREDNISONE 20 MG/1
40 TABLET ORAL DAILY
Qty: 10 TABLET | Refills: 0 | Status: SHIPPED | OUTPATIENT
Start: 2023-04-05 | End: 2023-04-10

## 2023-04-05 RX ORDER — AZITHROMYCIN 250 MG/1
TABLET, FILM COATED ORAL
Qty: 6 TABLET | Refills: 0 | Status: SHIPPED | OUTPATIENT
Start: 2023-04-05 | End: 2023-04-10

## 2023-04-05 RX ADMIN — IOHEXOL 100 ML: 350 INJECTION, SOLUTION INTRAVENOUS at 18:27

## 2023-04-05 NOTE — TELEPHONE ENCOUNTER
"Patient reports a fever of 101 yesterday that came down to 99 today along with R sided pain below her R rib and congestion  Her COVID tests  so she did not test for COVID  She would like to know if she could have something ordered and would like a call back to advise  Reason for Disposition  • Fever with no signs of serious infection or localizing symptoms    Answer Assessment - Initial Assessment Questions  1  TEMPERATURE: \"What is the most recent temperature? \"  \"How was it measured? \"       99  2  ONSET: \"When did the fever start? \"       23  3  SYMPTOMS: \"Do you have any other symptoms besides the fever? \"  (e g , colds, headache, sore throat, earache, cough, rash, diarrhea, vomiting, abdominal pain)      R sided rib pain below rib, congestion   4  CAUSE: If there are no symptoms, ask: \"What do you think is causing the fever? \"       Unaware   5  CONTACTS: \"Does anyone else in the family have an infection? \"      Denies    6  TREATMENT: \"What have you done so far to treat this fever? \" (e g , medications)     Tylenol  7  IMMUNOCOMPROMISE: \"Do you have of the following: diabetes, HIV positive, splenectomy, cancer chemotherapy, chronic steroid treatment, transplant patient, etc \"      Denies   8  PREGNANCY: \"Is there any chance you are pregnant? \" \"When was your last menstrual period? \"      N/A  9  TRAVEL: \"Have you traveled out of the country in the last month? \" (e g , travel history, exposures)      Denies    Protocols used:  FEVER-ADULT-[place]    "

## 2023-04-05 NOTE — TELEPHONE ENCOUNTER
"Regarding: fever/rib pain  ----- Message from [facility] sent at 4/5/2023  9:38 AM EDT -----  \"I have a fever of 99 0 (oral)  I have pain on my right side under my ribs  \"    "

## 2023-04-05 NOTE — TELEPHONE ENCOUNTER
Spoke with patient  She explained her symptoms: began around 1-130 yesterday afternoon  She has congestion, RUQ pain and fever (started as 101 but with tylenol it is routinely in the 99 range)  Denies having any N/V/D  Appt has been scheduled to see Dr Jonette Homans @ 145pm today  Advised to wear a mask when coming into the office  Offered a virtual but patient was unsure how to do them since she's never done a virtual visit before

## 2023-04-06 LAB
FLUAV RNA RESP QL NAA+PROBE: NEGATIVE
FLUBV RNA RESP QL NAA+PROBE: NEGATIVE
SARS-COV-2 RNA RESP QL NAA+PROBE: NEGATIVE

## 2023-04-06 NOTE — PROGRESS NOTES
COVID-19 Outpatient Progress Note    Assessment/Plan:    Problem List Items Addressed This Visit        Respiratory    Asthma    Relevant Medications    predniSONE 20 mg tablet    azithromycin (Zithromax) 250 mg tablet       Other    Recurrent major depressive disorder, in partial remission (HCC)    Relevant Medications    predniSONE 20 mg tablet    azithromycin (Zithromax) 250 mg tablet    Fever - Primary    Relevant Medications    predniSONE 20 mg tablet    azithromycin (Zithromax) 250 mg tablet    Other Relevant Orders    CT abdomen pelvis w contrast (Completed)    Covid/Flu- Office Collect (Completed)    RUQ pain    Relevant Medications    predniSONE 20 mg tablet    azithromycin (Zithromax) 250 mg tablet    Other Relevant Orders    CT abdomen pelvis w contrast (Completed)    Right flank pain    Relevant Medications    predniSONE 20 mg tablet    azithromycin (Zithromax) 250 mg tablet    Other Relevant Orders    CT abdomen pelvis w contrast (Completed)   Other Visit Diagnoses     Sequelae of protein-calorie malnutrition (HCC)        Relevant Medications    predniSONE 20 mg tablet    azithromycin (Zithromax) 250 mg tablet    SOB (shortness of breath)        Relevant Orders    Covid/Flu- Office Collect (Completed)         Disposition:     PCR testing will be performed to test for COVID-19/Influenza  I have spent a total time of 10 minutes on the day of the encounter for this patient including risks and benefits of treatment options, instructions for management and patient and family education   44-year-old woman with: Right upper quadrant along with right flank pain in the setting of patient with fevers and upper respiratory symptoms we will do COVID testing per patient's request we will check CT discussed working return parameters advised her to call back for follow-up of her worsening      Encounter provider: Amy Pedro MD     Provider located at: [place] [address] [facility]  [phone] [facility] [id number]     Recent Visits  Date Type Provider Dept   04/05/23 Office Visit Ammy Barrios MD [address] [facility] recent visits within past 7 days and meeting all other requirements  Future Appointments  No visits were found meeting these conditions  Showing future appointments within next 150 days and meeting all other requirements     Subjective:   Nichole Jones is a 72 y o  female who is concerned about COVID-19  Patient's symptoms include fever, chills, nasal congestion, cough and abdominal pain  COVID-19 vaccination status: Fully vaccinated with booster    Exposure:     Hospitalized recently for fever and/or lower respiratory symptoms?: No      Currently a healthcare worker that is involved in direct patient care?: No      Works in a special setting where the risk of COVID-19 transmission may be high? (this may include long-term care, correctional and penitentiary facilities; homeless shelters; assisted-living facilities and group homes ): No      Resident in a special setting where the risk of COVID-19 transmission may be high? (this may include long-term care, correctional and penitentiary facilities; homeless shelters; assisted-living facilities and group homes ): No      Patient admits 82-year-old woman complaining of several days of cough congestion sinus pressure with fevers and chills no abdominal pain tolerating p o  intake    Lab Results   Component Value Date    SARSCOV2 Negative 04/05/2023       Review of Systems   Constitutional: Positive for chills and fever  HENT: Positive for congestion and sinus pressure  Eyes: Negative  Respiratory: Positive for cough  Cardiovascular: Negative  Gastrointestinal: Positive for abdominal pain  Endocrine: Negative  Genitourinary: Positive for flank pain  Allergic/Immunologic: Negative  Neurological: Negative  Hematological: Negative      Psychiatric/Behavioral: "Negative  All other systems reviewed and are negative  Current Outpatient Medications on File Prior to Visit   Medication Sig   • acetaminophen (TYLENOL) 325 mg tablet Take 650 mg by mouth every 6 (six) hours as needed for mild pain   • EPINEPHrine (EPIPEN) 0 3 mg/0 3 mL SOAJ Inject 0 3 mL (0 3 mg total) into a muscle once for 1 dose   • fluticasone (FLONASE) 50 mcg/act nasal spray 2 SPRAYS INTO EACH NOSTRIL IN THE MORNING  • loratadine (Alavert) 10 MG dissolvable tablet Take 10 mg by mouth as needed    • Polyethylene Glycol 3350 (MIRALAX PO) Take by mouth as needed    • triamcinolone (KENALOG) 0 1 % cream Apply topically 2 (two) times a day   • albuterol (PROVENTIL HFA,VENTOLIN HFA) 90 mcg/act inhaler Inhale 1 puff  as needed in the morning for wheezing  (Patient not taking: Reported on 4/5/2023)   • ibuprofen (MOTRIN) 200 mg tablet Take by mouth every 6 (six) hours as needed for mild pain (Patient not taking: Reported on 4/5/2023)   • ketotifen (ZADITOR) 0 025 % ophthalmic solution Administer 1 drop to both eyes as needed in the morning and 1 drop as needed in the evening (eye itching or discomfort)  (Patient not taking: Reported on 4/5/2023)   • predniSONE 10 mg tablet Take 4 tabs daily x 3d, then 3x3d, then 2x3d, 1x3d then 0 5x3d then stop  Objective:    /60 (BP Location: Left arm, Patient Position: Sitting, Cuff Size: Standard)   Pulse 92   Temp 98 5 °F (36 9 °C) (Temporal)   Ht 5' 5\" (1 651 m)   Wt 54 3 kg (119 lb 12 8 oz)   LMP  (LMP Unknown)   SpO2 95%   BMI 19 94 kg/m²      Physical Exam  Constitutional:       General: She is not in acute distress  Appearance: She is well-developed  She is not diaphoretic  HENT:      Head: Normocephalic and atraumatic  Right Ear: External ear normal       Left Ear: External ear normal       Nose: Nose normal       Mouth/Throat:      Pharynx: No oropharyngeal exudate  Eyes:      General:         Right eye: No discharge           Left " eye: No discharge  Conjunctiva/sclera: Conjunctivae normal       Pupils: Pupils are equal, round, and reactive to light  Neck:      Thyroid: No thyromegaly  Trachea: No tracheal deviation  Cardiovascular:      Rate and Rhythm: Normal rate and regular rhythm  Heart sounds: Normal heart sounds  No murmur heard  No friction rub  No gallop  Pulmonary:      Effort: Pulmonary effort is normal  No respiratory distress  Breath sounds: Normal breath sounds  Abdominal:      General: There is no distension  Palpations: Abdomen is soft  Tenderness: There is abdominal tenderness in the right upper quadrant  There is no guarding or rebound  Musculoskeletal:         General: Normal range of motion  Lymphadenopathy:      Cervical: No cervical adenopathy  Skin:     General: Skin is warm  Neurological:      Mental Status: She is alert and oriented to person, place, and time  Cranial Nerves: No cranial nerve deficit  Psychiatric:         Behavior: Behavior normal          Thought Content:  Thought content normal          Judgment: Judgment normal        Esmer Ryan MD

## 2023-04-06 NOTE — RESULT ENCOUNTER NOTE
Please call patient  CT shows constipation and gas, no other pathology, thankfully   She shouldcall back if not improving or worsening

## 2023-05-15 ENCOUNTER — LAB (OUTPATIENT)
Dept: LAB | Facility: CLINIC | Age: 73
End: 2023-05-15

## 2023-05-15 DIAGNOSIS — E78.5 HYPERLIPIDEMIA, UNSPECIFIED HYPERLIPIDEMIA TYPE: ICD-10-CM

## 2023-05-15 LAB
CHOLEST SERPL-MCNC: 197 MG/DL
HDLC SERPL-MCNC: 85 MG/DL
LDLC SERPL CALC-MCNC: 96 MG/DL (ref 0–100)
TRIGL SERPL-MCNC: 80 MG/DL

## 2023-05-17 ENCOUNTER — OFFICE VISIT (OUTPATIENT)
Dept: FAMILY MEDICINE CLINIC | Facility: CLINIC | Age: 73
End: 2023-05-17

## 2023-05-17 VITALS
HEART RATE: 86 BPM | HEIGHT: 65 IN | TEMPERATURE: 99 F | BODY MASS INDEX: 20.06 KG/M2 | OXYGEN SATURATION: 96 % | DIASTOLIC BLOOD PRESSURE: 72 MMHG | WEIGHT: 120.4 LBS | SYSTOLIC BLOOD PRESSURE: 124 MMHG

## 2023-05-17 DIAGNOSIS — J45.909 UNCOMPLICATED ASTHMA, UNSPECIFIED ASTHMA SEVERITY, UNSPECIFIED WHETHER PERSISTENT: ICD-10-CM

## 2023-05-17 DIAGNOSIS — F33.41 RECURRENT MAJOR DEPRESSIVE DISORDER, IN PARTIAL REMISSION (HCC): ICD-10-CM

## 2023-05-17 DIAGNOSIS — E53.8 B12 DEFICIENCY: Primary | ICD-10-CM

## 2023-05-17 DIAGNOSIS — E46 PROTEIN-CALORIE MALNUTRITION, UNSPECIFIED SEVERITY (HCC): ICD-10-CM

## 2023-05-17 RX ORDER — CYANOCOBALAMIN 1000 UG/ML
1000 INJECTION, SOLUTION INTRAMUSCULAR; SUBCUTANEOUS
Status: SHIPPED | OUTPATIENT
Start: 2023-05-17

## 2023-05-17 RX ORDER — ALBUTEROL SULFATE 90 UG/1
1 AEROSOL, METERED RESPIRATORY (INHALATION) AS NEEDED
Qty: 8.5 G | Refills: 2 | Status: SHIPPED | OUTPATIENT
Start: 2023-05-17

## 2023-05-17 RX ADMIN — CYANOCOBALAMIN 1000 MCG: 1000 INJECTION, SOLUTION INTRAMUSCULAR; SUBCUTANEOUS at 12:47

## 2023-05-18 NOTE — PROGRESS NOTES
Assessment/Plan:    82-year-old woman with: Asthma major depressive disorder protein calorie malnutrition  B12 deficiency discussed with return parameters continue current medications follow-up in 6 months understanding    No problem-specific Assessment & Plan notes found for this encounter  Diagnoses and all orders for this visit:    B12 deficiency  -     cyanocobalamin injection 9,660 mcg    Uncomplicated asthma, unspecified asthma severity, unspecified whether persistent  -     albuterol (PROVENTIL HFA,VENTOLIN HFA) 90 mcg/act inhaler; Inhale 1 puff as needed for wheezing    Recurrent major depressive disorder, in partial remission (HCC)    Protein-calorie malnutrition, unspecified severity (HCC)          Subjective:     Chief Complaint   Patient presents with   • Follow-up     6 month follow up and go over labs   • B12 Injection     Patient is requesting b12 injections, no further concerns ng        Patient ID: Marnie Pallas is a 72 y o  female  Patient is a 82-year-old woman presents for follow-up on on Asthma major depressive disorder protein calorie malnutrition  B12 deficiency stable medications No fevers chills nausea vomiting Intake no other complaints at this time      The following portions of the patient's history were reviewed and updated as appropriate: allergies, current medications, past family history, past medical history, past social history, past surgical history and problem list     Review of Systems   Constitutional: Negative  HENT: Negative  Eyes: Negative  Respiratory: Negative  Cardiovascular: Negative  Gastrointestinal: Negative  Endocrine: Negative  Genitourinary: Negative  Musculoskeletal: Negative  Allergic/Immunologic: Negative  Neurological: Negative  Hematological: Negative  Psychiatric/Behavioral: Negative  All other systems reviewed and are negative          Objective:      /72 (BP Location: Right arm, Patient Position: Sitting, "Cuff Size: Standard)   Pulse 86   Temp 99 °F (37 2 °C) (Tympanic)   Ht 5' 5\" (1 651 m)   Wt 54 6 kg (120 lb 6 4 oz)   LMP  (LMP Unknown)   SpO2 96%   BMI 20 04 kg/m²          Physical Exam  Constitutional:       Appearance: She is well-developed  HENT:      Head: Atraumatic  Right Ear: External ear normal       Left Ear: External ear normal    Eyes:      Conjunctiva/sclera: Conjunctivae normal       Pupils: Pupils are equal, round, and reactive to light  Cardiovascular:      Rate and Rhythm: Normal rate and regular rhythm  Heart sounds: Normal heart sounds  Pulmonary:      Effort: Pulmonary effort is normal  No respiratory distress  Breath sounds: Normal breath sounds  Abdominal:      General: There is no distension  Palpations: Abdomen is soft  Tenderness: There is no abdominal tenderness  There is no guarding or rebound  Musculoskeletal:         General: Normal range of motion  Cervical back: Normal range of motion  Skin:     General: Skin is warm and dry  Neurological:      Mental Status: She is alert and oriented to person, place, and time  Cranial Nerves: No cranial nerve deficit  Psychiatric:         Behavior: Behavior normal          Thought Content:  Thought content normal          Judgment: Judgment normal             "

## 2023-06-04 PROBLEM — R50.9 FEVER: Status: RESOLVED | Noted: 2023-04-05 | Resolved: 2023-06-04

## 2023-06-13 ENCOUNTER — OFFICE VISIT (OUTPATIENT)
Dept: FAMILY MEDICINE CLINIC | Facility: CLINIC | Age: 73
End: 2023-06-13
Payer: MEDICARE

## 2023-06-13 ENCOUNTER — HOSPITAL ENCOUNTER (OUTPATIENT)
Dept: RADIOLOGY | Facility: HOSPITAL | Age: 73
Discharge: HOME | End: 2023-06-13
Payer: MEDICARE

## 2023-06-13 VITALS
HEART RATE: 94 BPM | OXYGEN SATURATION: 96 % | WEIGHT: 116 LBS | HEIGHT: 65 IN | DIASTOLIC BLOOD PRESSURE: 80 MMHG | BODY MASS INDEX: 19.33 KG/M2 | TEMPERATURE: 98.2 F | SYSTOLIC BLOOD PRESSURE: 114 MMHG

## 2023-06-13 DIAGNOSIS — G89.29 CHRONIC RIGHT SHOULDER PAIN: ICD-10-CM

## 2023-06-13 DIAGNOSIS — G89.29 CHRONIC RIGHT SHOULDER PAIN: Primary | ICD-10-CM

## 2023-06-13 DIAGNOSIS — M25.511 CHRONIC RIGHT SHOULDER PAIN: ICD-10-CM

## 2023-06-13 DIAGNOSIS — M25.511 CHRONIC RIGHT SHOULDER PAIN: Primary | ICD-10-CM

## 2023-06-13 PROCEDURE — 73030 X-RAY EXAM OF SHOULDER: CPT

## 2023-06-13 PROCEDURE — 99213 OFFICE O/P EST LOW 20 MIN: CPT | Performed by: FAMILY MEDICINE

## 2023-06-13 RX ORDER — PREDNISONE 10 MG/1
TABLET ORAL
Qty: 50 TABLET | Refills: 0 | Status: SHIPPED | OUTPATIENT
Start: 2023-06-13

## 2023-06-15 ENCOUNTER — TELEPHONE (OUTPATIENT)
Dept: FAMILY MEDICINE CLINIC | Facility: CLINIC | Age: 73
End: 2023-06-15

## 2023-06-15 NOTE — TELEPHONE ENCOUNTER
Patient called and left a message asking why she doesn't see the results of her xrays through [facility] yet  She had the xrays done on the 13th  I left a message asking patient to call back to explain as of 1045am today 6/15 - the results are still pending  They have not been read yet

## 2023-06-16 NOTE — PROGRESS NOTES
"Assessment/Plan:    71 y/o woman with: Chronic shoulder pain  Discussed supportive care and return parameters  Will give steroid taper  Will check Xray and refer to PT  No problem-specific Assessment & Plan notes found for this encounter  Diagnoses and all orders for this visit:    Chronic right shoulder pain  -     predniSONE 10 mg tablet; Take 4 tabs daily x 4d then 3x4d then 2x4d then 1x4d then 0 5x4d then stop  -     XR shoulder 2+ vw right; Future  -     Ambulatory Referral to Physical Therapy; Future          Subjective:     Chief Complaint   Patient presents with   • Shoulder Pain     Patient states she is experiencing frozen shoulder and it has been an issue for the last 2 weeks  Patient given reprinted order for mammogram  No further concerns, ng        Patient ID: Brigid Hatfield is a 72 y o  female  Patient is a 71 y/o woman who presents for follow-up on chronic right shoulder pain that is not improving no fevers chills nausea or vomiting no weakness numbness or tingling      The following portions of the patient's history were reviewed and updated as appropriate: allergies, current medications, past family history, past medical history, past social history, past surgical history and problem list     Review of Systems   Constitutional: Negative  HENT: Negative  Eyes: Negative  Respiratory: Negative  Cardiovascular: Negative  Gastrointestinal: Negative  Endocrine: Negative  Genitourinary: Negative  Musculoskeletal: Positive for arthralgias and myalgias  Allergic/Immunologic: Negative  Neurological: Negative  Hematological: Negative  Psychiatric/Behavioral: Negative  All other systems reviewed and are negative          Objective:      /80 (BP Location: Left arm, Patient Position: Sitting, Cuff Size: Standard)   Pulse 94   Temp 98 2 °F (36 8 °C) (Tympanic)   Ht 5' 5\" (1 651 m)   Wt 52 6 kg (116 lb)   LMP  (LMP Unknown)   SpO2 96%   BMI 19 30 kg/m² " Physical Exam  Constitutional:       Appearance: She is well-developed  HENT:      Head: Atraumatic  Right Ear: External ear normal       Left Ear: External ear normal    Eyes:      Conjunctiva/sclera: Conjunctivae normal       Pupils: Pupils are equal, round, and reactive to light  Pulmonary:      Effort: Pulmonary effort is normal  No respiratory distress  Abdominal:      General: There is no distension  Musculoskeletal:         General: Tenderness present  Cervical back: Normal range of motion  Skin:     General: Skin is warm and dry  Neurological:      Mental Status: She is alert and oriented to person, place, and time  Cranial Nerves: No cranial nerve deficit  Psychiatric:         Behavior: Behavior normal          Thought Content:  Thought content normal          Judgment: Judgment normal

## 2023-06-19 ENCOUNTER — CLINICAL SUPPORT (OUTPATIENT)
Dept: FAMILY MEDICINE CLINIC | Facility: CLINIC | Age: 73
End: 2023-06-19
Payer: MEDICARE

## 2023-06-19 DIAGNOSIS — E53.8 B12 DEFICIENCY: Primary | ICD-10-CM

## 2023-06-19 PROCEDURE — 96372 THER/PROPH/DIAG INJ SC/IM: CPT

## 2023-06-19 RX ORDER — CYANOCOBALAMIN 1000 UG/ML
1000 INJECTION, SOLUTION INTRAMUSCULAR; SUBCUTANEOUS
Status: SHIPPED | OUTPATIENT
Start: 2023-06-19

## 2023-06-19 RX ADMIN — CYANOCOBALAMIN 1000 MCG: 1000 INJECTION, SOLUTION INTRAMUSCULAR; SUBCUTANEOUS at 15:04

## 2023-06-20 ENCOUNTER — TELEPHONE (OUTPATIENT)
Dept: FAMILY MEDICINE CLINIC | Facility: CLINIC | Age: 73
End: 2023-06-20

## 2023-06-21 NOTE — TELEPHONE ENCOUNTER
Spoke with pt, she is going to decide if she wants to go to PT or ortho and get back to us re: a referral for one of them

## 2023-07-17 ENCOUNTER — CLINICAL SUPPORT (OUTPATIENT)
Dept: FAMILY MEDICINE CLINIC | Facility: CLINIC | Age: 73
End: 2023-07-17
Payer: MEDICARE

## 2023-07-17 VITALS — WEIGHT: 117.2 LBS | HEIGHT: 65 IN | BODY MASS INDEX: 19.53 KG/M2

## 2023-07-17 DIAGNOSIS — E53.8 B12 DEFICIENCY: Primary | ICD-10-CM

## 2023-07-17 PROCEDURE — 96372 THER/PROPH/DIAG INJ SC/IM: CPT

## 2023-07-17 RX ORDER — CYANOCOBALAMIN 1000 UG/ML
1000 INJECTION, SOLUTION INTRAMUSCULAR; SUBCUTANEOUS
Status: SHIPPED | OUTPATIENT
Start: 2023-07-17

## 2023-07-17 RX ADMIN — CYANOCOBALAMIN 1000 MCG: 1000 INJECTION, SOLUTION INTRAMUSCULAR; SUBCUTANEOUS at 09:09

## 2023-08-17 ENCOUNTER — RA CDI HCC (OUTPATIENT)
Dept: OTHER | Facility: HOSPITAL | Age: 73
End: 2023-08-17

## 2023-08-17 NOTE — PROGRESS NOTES
[address] [facility] coding opportunities       Chart reviewed, no opportunity found: CHART REVIEWED, NO OPPORTUNITY FOUND        Patients Insurance     Medicare Insurance: Medicare

## 2023-08-25 ENCOUNTER — OFFICE VISIT (OUTPATIENT)
Dept: FAMILY MEDICINE CLINIC | Facility: CLINIC | Age: 73
End: 2023-08-25
Payer: MEDICARE

## 2023-08-25 VITALS
DIASTOLIC BLOOD PRESSURE: 68 MMHG | HEIGHT: 65 IN | OXYGEN SATURATION: 98 % | SYSTOLIC BLOOD PRESSURE: 112 MMHG | TEMPERATURE: 98.6 F | WEIGHT: 122.4 LBS | HEART RATE: 82 BPM | BODY MASS INDEX: 20.39 KG/M2

## 2023-08-25 DIAGNOSIS — Z00.00 MEDICARE ANNUAL WELLNESS VISIT, SUBSEQUENT: ICD-10-CM

## 2023-08-25 DIAGNOSIS — Z12.11 COLON CANCER SCREENING: Primary | ICD-10-CM

## 2023-08-25 PROCEDURE — G0439 PPPS, SUBSEQ VISIT: HCPCS | Performed by: FAMILY MEDICINE

## 2023-08-25 RX ADMIN — CYANOCOBALAMIN 1000 MCG: 1000 INJECTION, SOLUTION INTRAMUSCULAR; SUBCUTANEOUS at 10:43

## 2023-08-25 NOTE — PROGRESS NOTES
Assessment and Plan:     Problem List Items Addressed This Visit        Other    Medicare annual wellness visit, subsequent   Other Visit Diagnoses     Colon cancer screening    -  Primary    Relevant Orders    Cologuard           Preventive health issues were discussed with patient, and age appropriate screening tests were ordered as noted in patient's After Visit Summary. Personalized health advice and appropriate referrals for health education or preventive services given if needed, as noted in patient's After Visit Summary. History of Present Illness:     Patient presents for a Medicare Wellness Visit    HPI   Patient Care Team:  Darian Hendrickson MD as PCP - General  DO Golden Galvez [place] (Vascular Surgery)  Mari Bruner MD (Vascular Surgery)     Review of Systems:     Review of Systems   Constitutional: Negative. HENT: Negative. Eyes: Negative. Respiratory: Negative. Cardiovascular: Negative. Gastrointestinal: Negative. Endocrine: Negative. Genitourinary: Negative. Musculoskeletal: Negative. Allergic/Immunologic: Negative. Neurological: Negative. Hematological: Negative. Psychiatric/Behavioral: Negative. All other systems reviewed and are negative.        Problem List:     Patient Active Problem List   Diagnosis   • Recurrent major depressive disorder, in partial remission (HCC)   • Pleural thickening   • Symptomatic varicose veins of both lower extremities   • Vitamin D deficiency   • Changing skin lesion   • Asthma   • Other acute recurrent sinusitis   • Dermatitis   • Superficial phlebitis of leg, right   • Medicare annual wellness visit, subsequent   • Left knee injury   • Left shoulder pain   • Cerumen impaction   • Depression   • Right foot pain   • RLQ abdominal tenderness   • Bug bite   • Protein-calorie malnutrition ([address])   • Wynne's neuroma of right foot   • Chronic pain of right ankle   • Right knee injury   • Abnormal EKG • Preoperative cardiovascular examination   • Petechiae   • Hyperlipidemia   • B12 deficiency   • RUQ pain   • Right flank pain   • Chronic right shoulder pain      Past Medical and Surgical History:     Past Medical History:   Diagnosis Date   • Allergic 1999    penicillin,ragweed,cat,dog,mold,grass,moderate soy   • Anxiety    • Asthma     Allergy Induced   • Breast cancer ([address]) 2003    left breast phyllodes   • Cancer ([address])     Left Breast CA   • Palpitations      Past Surgical History:   Procedure Laterality Date   • BREAST BIOPSY Right     benign   • BREAST LUMPECTOMY Left     phyllodes   • COLONOSCOPY     • [place] ENDOVEN ABLTJ INCMPTNT VEIN XTR LASER 1ST VEIN Right 2021    Procedure: ENDOVASCULAR LASER THERAPY (EVLT)W/ PHLEBECTOMY x19;  Surgeon: Antonino Grewal MD;  Location: [facility];  Service: Vascular   • TONSILLECTOMY     • WISDOM TOOTH EXTRACTION        Family History:     Family History   Problem Relation Age of Onset   • Uterine cancer Mother    • Varicose Veins Mother    • Hypertension Mother    • Dysfunctional uterine bleeding Mother    • Endometrial cancer Mother 80   • COPD Father    • Heart disease Father         Cardiac disorder   • COPD Sister    • Lung cancer Sister 72   • Heart attack Brother    • No Known Problems Maternal Grandmother    • No Known Problems Maternal Grandfather    • No Known Problems Paternal Grandmother    • No Known Problems Paternal Grandfather    • Diabetes Family       Social History:     Social History     Socioeconomic History   • Marital status: Single     Spouse name: None   • Number of children: None   • Years of education: None   • Highest education level: Some college, no degree   Occupational History   • Occupation: retired   [email or web address] service   Tobacco Use   • Smoking status: Former     Packs/day: 0.25     Years: 20.00     Total pack years: 5.00     Types: Cigarettes     Quit date:      Years since quittin.6   • Smokeless tobacco: Never Vaping Use   • Vaping Use: Never used   Substance and Sexual Activity   • Alcohol use: Not Currently     Alcohol/week: 1.0 standard drink of alcohol     Types: 1 Cans of beer per week     Comment: rarely   • Drug use: No   • Sexual activity: Not Currently   Other Topics Concern   • None   Social History Narrative    Always uses seatbelt        Caffeine use consumes 2 cups of coffee per day         Social Determinants of Health     Financial Resource Strain: Low Risk  (8/25/2023)    Overall Financial Resource Strain (CARDIA)    • Difficulty of Paying Living Expenses: Not hard at all   Food Insecurity: No Food Insecurity (6/9/2021)    Hunger Vital Sign    • Worried About Running Out of Food in the Last Year: Never true    • Ran Out of Food in the Last Year: Never true   Transportation Needs: No Transportation Needs (8/25/2023)    PRAPARE - Transportation    • Lack of Transportation (Medical): No    • Lack of Transportation (Non-Medical): No   Physical Activity: Sufficiently Active (5/19/2021)    Exercise Vital Sign    • Days of Exercise per Week: 7 days    • Minutes of Exercise per Session: 30 min   Stress: No Stress Concern Present (5/19/2021)    [address] [place] [address]    • Feeling of Stress : Only a little   Social Connections: Moderately Isolated (5/19/2021)    Social Connection and Isolation Panel [NHANES]    • Frequency of Communication with Friends and Family: More than three times a week    • Frequency of Social Gatherings with Friends and Family:  Three times a week    • Attends Voodoo Services: More than 4 times per year    • Active Member of Clubs or Organizations: No    • Attends Club or Organization Meetings: Never    • Marital Status:    Intimate Partner Violence: Not At Risk (5/19/2021)    Humiliation, Afraid, Rape, and Kick questionnaire    • Fear of Current or Ex-Partner: No    • Emotionally Abused: No    • Physically Abused: No    • Sexually Abused: No   Housing Stability: Not on file      Medications and Allergies:     Current Outpatient Medications   Medication Sig Dispense Refill   • acetaminophen (TYLENOL) 325 mg tablet Take 650 mg by mouth every 6 (six) hours as needed for mild pain     • albuterol (PROVENTIL HFA,VENTOLIN HFA) 90 mcg/act inhaler Inhale 1 puff as needed for wheezing 8.5 g 2   • fluticasone (FLONASE) 50 mcg/act nasal spray 2 SPRAYS INTO EACH NOSTRIL IN THE MORNING. 16 mL 1   • loratadine (Alavert) 10 MG dissolvable tablet Take 10 mg by mouth as needed      • Polyethylene Glycol 3350 (MIRALAX PO) Take by mouth as needed      • triamcinolone (KENALOG) 0.1 % cream Apply topically 2 (two) times a day 30 g 0   • EPINEPHrine (EPIPEN) 0.3 mg/0.3 mL SOAJ Inject 0.3 mL (0.3 mg total) into a muscle once for 1 dose 0.6 mL 0   • ibuprofen (MOTRIN) 200 mg tablet Take by mouth every 6 (six) hours as needed for mild pain (Patient not taking: Reported on 4/5/2023)     • ketotifen (ZADITOR) 0.025 % ophthalmic solution Administer 1 drop to both eyes as needed in the morning and 1 drop as needed in the evening (eye itching or discomfort). (Patient not taking: Reported on 4/5/2023) 5 mL 2   • predniSONE 10 mg tablet Take 4 tabs daily x 4d then 3x4d then 2x4d then 1x4d then 0.5x4d then stop.  (Patient not taking: Reported on 8/25/2023) 50 tablet 0     Current Facility-Administered Medications   Medication Dose Route Frequency Provider Last Rate Last Admin   • cyanocobalamin injection 1,000 mcg  1,000 mcg Intramuscular Q30 Days Ruby Hall MD   1,000 mcg at 08/25/23 1043   • cyanocobalamin injection 1,000 mcg  1,000 mcg Intramuscular Q30 Days Ruby Hall MD   1,000 mcg at 11/16/22 1445   • cyanocobalamin injection 1,000 mcg  1,000 mcg Intramuscular Q30 Days Ruby Hall MD   1,000 mcg at 05/17/23 1247   • cyanocobalamin injection 1,000 mcg  1,000 mcg Intramuscular Q30 Days Ruby Hall MD   1,000 mcg at 06/19/23 1504   • cyanocobalamin injection 1,000 mcg  1,000 mcg Intramuscular Q30 Days Marianne Mujica MD   1,000 mcg at 07/17/23 9206     Allergies   Allergen Reactions   • Gabapentin Tinnitus     Side of head numb   • Wasp Venom Protein Hives   • Ambrosia Artemisiifolia (Ragweed) Skin Test Allergic Rhinitis   • Cat Hair Extract Allergic Rhinitis   • Isoflavones (Soy) Rash   • Mold Extract [Trichophyton] Allergic Rhinitis   • Penicillins Rash      Immunizations:     Immunization History   Administered Date(s) Administered   • COVID-19 MODERNA VACC 0.5 ML IM 03/17/2021, 04/14/2021, 12/10/2021   • INFLUENZA 11/20/2012, 11/15/2013, 10/20/2014, 10/03/2017, 11/17/2021   • Influenza, high dose seasonal 0.7 mL 10/16/2019, 11/18/2020, 11/17/2021, 11/16/2022   • Influenza, seasonal, injectable 11/02/2020   • Tdap 11/11/2009, 08/22/2016      Health Maintenance:         Topic Date Due   • Breast Cancer Screening: Mammogram  05/21/2023   • Colorectal Cancer Screening  06/14/2023   • Hepatitis C Screening  Completed         Topic Date Due   • Pneumococcal Vaccine: 65+ Years (1 - PCV) Never done   • COVID-19 Vaccine (4 - Zollie Sprain series) 02/04/2022   • Influenza Vaccine (1) 09/01/2023      Medicare Screening Tests and Risk Assessments:     Yuliya Loera is here for her Subsequent Wellness visit. Last Medicare Wellness visit information reviewed, patient interviewed and updates made to the record today. Health Risk Assessment:   Patient rates overall health as very good. Patient feels that their physical health rating is same. Patient is satisfied with their life. Eyesight was rated as same. Hearing was rated as same. Patient feels that their emotional and mental health rating is slightly better. Patients states they are never, rarely angry. Patient states they are never, rarely unusually tired/fatigued. Pain experienced in the last 7 days has been some. Patient's pain rating has been 5/10.  Patient states that she has experienced no weight loss or gain in last 6 months. Depression Screening:   PHQ-9 Score: 0      Fall Risk Screening: In the past year, patient has experienced: no history of falling in past year      Urinary Incontinence Screening:   Patient has not leaked urine accidently in the last six months. Home Safety:  Patient does not have trouble with stairs inside or outside of their home. Patient has working smoke alarms and has working carbon monoxide detector. Home safety hazards include: none. Nutrition:   Current diet is Regular. Medications:   Patient is currently taking over-the-counter supplements. OTC medications include: Multivitamin, D3. Patient is able to manage medications. Activities of Daily Living (ADLs)/Instrumental Activities of Daily Living (IADLs):   Walk and transfer into and out of bed and chair?: Yes  Dress and groom yourself?: Yes    Bathe or shower yourself?: Yes    Feed yourself? Yes  Do your laundry/housekeeping?: Yes  Manage your money, pay your bills and track your expenses?: Yes  Make your own meals?: Yes    Do your own shopping?: Yes    Previous Hospitalizations:   Any hospitalizations or ED visits within the last 12 months?: No      Advance Care Planning:   Living will: Yes    Durable POA for healthcare:  Yes    Advanced directive: Yes      Cognitive Screening:   Provider or family/friend/caregiver concerned regarding cognition?: No    PREVENTIVE SCREENINGS      Cardiovascular Screening:    General: Screening Not Indicated and History Lipid Disorder      Diabetes Screening:     General: Screening Not Indicated      Colorectal Cancer Screening:     General: Risks and Benefits Discussed    Due for: Cologuard      Breast Cancer Screening:     General: History Breast Cancer and Patient Declines      Cervical Cancer Screening:    General: Screening Not Indicated      Osteoporosis Screening:    General: Patient Declines      Abdominal Aortic Aneurysm (AAA) Screening:        General: Screening Not Indicated      Lung Cancer Screening:     General: Screening Not Indicated      Hepatitis C Screening:    General: Screening Current    Screening, Brief Intervention, and Referral to Treatment (SBIRT)    Screening  Typical number of drinks in a day: 0  Typical number of drinks in a week: 0  Interpretation: Low risk drinking behavior. AUDIT-C Screenin) How often did you have a drink containing alcohol in the past year? monthly or less  2) How many drinks did you have on a typical day when you were drinking in the past year? 1 to 2  3) How often did you have 6 or more drinks on one occasion in the past year? never    AUDIT-C Score: 1  Interpretation: Score 0-2 (female): Negative screen for alcohol misuse    Single Item Drug Screening:  How often have you used an illegal drug (including marijuana) or a prescription medication for non-medical reasons in the past year? never    Single Item Drug Screen Score: 0  Interpretation: Negative screen for possible drug use disorder    No results found. Physical Exam:     /68 (BP Location: Left arm, Patient Position: Sitting, Cuff Size: Standard)   Pulse 82   Temp 98.6 °F (37 °C) (Tympanic)   Ht 5' 5" (1.651 m)   Wt 55.5 kg (122 lb 6.4 oz)   LMP  (LMP Unknown)   SpO2 98%   BMI 20.37 kg/m²     Physical Exam  Constitutional:       General: She is not in acute distress. Appearance: She is well-developed. She is not diaphoretic. HENT:      Head: Normocephalic and atraumatic. Right Ear: External ear normal.      Left Ear: External ear normal.      Nose: Nose normal.      Mouth/Throat:      Pharynx: No oropharyngeal exudate. Eyes:      General:         Right eye: No discharge. Left eye: No discharge. Conjunctiva/sclera: Conjunctivae normal.      Pupils: Pupils are equal, round, and reactive to light. Neck:      Thyroid: No thyromegaly. Trachea: No tracheal deviation.    Cardiovascular:      Rate and Rhythm: Normal rate and regular rhythm. Heart sounds: Normal heart sounds. No murmur heard. No friction rub. No gallop. Pulmonary:      Effort: Pulmonary effort is normal. No respiratory distress. Breath sounds: Normal breath sounds. Abdominal:      General: There is no distension. Palpations: Abdomen is soft. Tenderness: There is no abdominal tenderness. There is no guarding or rebound. Musculoskeletal:         General: Normal range of motion. Lymphadenopathy:      Cervical: No cervical adenopathy. Skin:     General: Skin is warm. Neurological:      Mental Status: She is alert and oriented to person, place, and time. Cranial Nerves: No cranial nerve deficit. Psychiatric:         Behavior: Behavior normal.         Thought Content:  Thought content normal.         Judgment: Judgment normal.          Pippa Craig MD

## 2023-09-06 LAB — COLOGUARD RESULT REPORTABLE: NEGATIVE

## 2023-09-25 ENCOUNTER — CLINICAL SUPPORT (OUTPATIENT)
Dept: FAMILY MEDICINE CLINIC | Facility: CLINIC | Age: 73
End: 2023-09-25
Payer: MEDICARE

## 2023-09-25 DIAGNOSIS — E53.8 B12 DEFICIENCY: Primary | ICD-10-CM

## 2023-09-25 PROCEDURE — 96372 THER/PROPH/DIAG INJ SC/IM: CPT

## 2023-09-25 RX ORDER — CYANOCOBALAMIN 1000 UG/ML
1000 INJECTION, SOLUTION INTRAMUSCULAR; SUBCUTANEOUS
Status: SHIPPED | OUTPATIENT
Start: 2023-09-25

## 2023-09-25 RX ADMIN — CYANOCOBALAMIN 1000 MCG: 1000 INJECTION, SOLUTION INTRAMUSCULAR; SUBCUTANEOUS at 10:23

## 2023-10-31 ENCOUNTER — CLINICAL SUPPORT (OUTPATIENT)
Dept: FAMILY MEDICINE CLINIC | Facility: CLINIC | Age: 73
End: 2023-10-31
Payer: MEDICARE

## 2023-10-31 DIAGNOSIS — E53.8 B12 DEFICIENCY: Primary | ICD-10-CM

## 2023-10-31 PROCEDURE — 96372 THER/PROPH/DIAG INJ SC/IM: CPT

## 2023-10-31 RX ADMIN — CYANOCOBALAMIN 1000 MCG: 1000 INJECTION, SOLUTION INTRAMUSCULAR; SUBCUTANEOUS at 10:13

## 2024-02-21 PROBLEM — Z00.00 MEDICARE ANNUAL WELLNESS VISIT, SUBSEQUENT: Status: RESOLVED | Noted: 2019-04-12 | Resolved: 2024-02-21

## 2024-02-21 PROBLEM — J01.81 OTHER ACUTE RECURRENT SINUSITIS: Status: RESOLVED | Noted: 2018-03-16 | Resolved: 2024-02-21

## 2024-02-21 PROBLEM — H61.20 CERUMEN IMPACTION: Status: RESOLVED | Noted: 2020-01-08 | Resolved: 2024-02-21

## 2024-08-26 DIAGNOSIS — F33.41 RECURRENT MAJOR DEPRESSIVE DISORDER, IN PARTIAL REMISSION (HCC): ICD-10-CM

## 2024-08-26 DIAGNOSIS — H11.32 SUBCONJUNCTIVAL HEMORRHAGE OF LEFT EYE: ICD-10-CM

## 2024-08-26 DIAGNOSIS — E55.9 VITAMIN D DEFICIENCY: ICD-10-CM

## 2024-08-26 DIAGNOSIS — L30.9 DERMATITIS: ICD-10-CM

## 2024-08-26 DIAGNOSIS — S89.91XA INJURY OF RIGHT KNEE, INITIAL ENCOUNTER: ICD-10-CM

## 2024-08-26 DIAGNOSIS — F32.A DEPRESSION, UNSPECIFIED DEPRESSION TYPE: ICD-10-CM

## 2024-08-26 DIAGNOSIS — E46 PROTEIN-CALORIE MALNUTRITION, UNSPECIFIED SEVERITY (HCC): ICD-10-CM

## 2024-08-26 DIAGNOSIS — J45.909 UNCOMPLICATED ASTHMA, UNSPECIFIED ASTHMA SEVERITY, UNSPECIFIED WHETHER PERSISTENT: ICD-10-CM

## 2024-08-26 RX ORDER — FLUTICASONE PROPIONATE 50 MCG
2 SPRAY, SUSPENSION (ML) NASAL DAILY
Qty: 16 ML | Refills: 0 | Status: CANCELLED | OUTPATIENT
Start: 2024-08-26

## 2024-08-26 RX ORDER — TRIAMCINOLONE ACETONIDE 1 MG/G
CREAM TOPICAL 2 TIMES DAILY
Qty: 30 G | Refills: 0 | Status: CANCELLED | OUTPATIENT
Start: 2024-08-26

## 2024-08-29 ENCOUNTER — OFFICE VISIT (OUTPATIENT)
Dept: FAMILY MEDICINE CLINIC | Facility: CLINIC | Age: 74
End: 2024-08-29
Payer: MEDICARE

## 2024-08-29 VITALS
SYSTOLIC BLOOD PRESSURE: 110 MMHG | BODY MASS INDEX: 18.99 KG/M2 | WEIGHT: 114 LBS | DIASTOLIC BLOOD PRESSURE: 78 MMHG | HEART RATE: 82 BPM | TEMPERATURE: 97.3 F | HEIGHT: 65 IN | OXYGEN SATURATION: 96 %

## 2024-08-29 DIAGNOSIS — J01.90 ACUTE SINUSITIS, RECURRENCE NOT SPECIFIED, UNSPECIFIED LOCATION: Primary | ICD-10-CM

## 2024-08-29 DIAGNOSIS — S89.91XA INJURY OF RIGHT KNEE, INITIAL ENCOUNTER: ICD-10-CM

## 2024-08-29 DIAGNOSIS — F32.A DEPRESSION, UNSPECIFIED DEPRESSION TYPE: ICD-10-CM

## 2024-08-29 DIAGNOSIS — E55.9 VITAMIN D DEFICIENCY: ICD-10-CM

## 2024-08-29 DIAGNOSIS — H11.32 SUBCONJUNCTIVAL HEMORRHAGE OF LEFT EYE: ICD-10-CM

## 2024-08-29 DIAGNOSIS — E46 PROTEIN-CALORIE MALNUTRITION, UNSPECIFIED SEVERITY (HCC): ICD-10-CM

## 2024-08-29 DIAGNOSIS — J45.909 UNCOMPLICATED ASTHMA, UNSPECIFIED ASTHMA SEVERITY, UNSPECIFIED WHETHER PERSISTENT: ICD-10-CM

## 2024-08-29 DIAGNOSIS — L30.9 DERMATITIS: ICD-10-CM

## 2024-08-29 DIAGNOSIS — H91.93 BILATERAL HEARING LOSS, UNSPECIFIED HEARING LOSS TYPE: ICD-10-CM

## 2024-08-29 DIAGNOSIS — F33.41 RECURRENT MAJOR DEPRESSIVE DISORDER, IN PARTIAL REMISSION (HCC): ICD-10-CM

## 2024-08-29 PROCEDURE — 99214 OFFICE O/P EST MOD 30 MIN: CPT | Performed by: FAMILY MEDICINE

## 2024-08-29 PROCEDURE — G2211 COMPLEX E/M VISIT ADD ON: HCPCS | Performed by: FAMILY MEDICINE

## 2024-08-29 RX ORDER — FLUTICASONE PROPIONATE 50 MCG
2 SPRAY, SUSPENSION (ML) NASAL DAILY
Qty: 16 ML | Refills: 1 | Status: SHIPPED | OUTPATIENT
Start: 2024-08-29

## 2024-08-29 RX ORDER — PREDNISONE 10 MG/1
TABLET ORAL
Qty: 40 TABLET | Refills: 0 | Status: SHIPPED | OUTPATIENT
Start: 2024-08-29

## 2024-08-29 RX ORDER — DOXYCYCLINE HYCLATE 100 MG
100 TABLET ORAL 2 TIMES DAILY
Qty: 14 TABLET | Refills: 0 | Status: SHIPPED | OUTPATIENT
Start: 2024-08-29 | End: 2024-09-05

## 2024-08-30 ENCOUNTER — TELEPHONE (OUTPATIENT)
Age: 74
End: 2024-08-30

## 2024-08-30 DIAGNOSIS — J01.90 ACUTE SINUSITIS, RECURRENCE NOT SPECIFIED, UNSPECIFIED LOCATION: Primary | ICD-10-CM

## 2024-08-30 RX ORDER — AZITHROMYCIN 250 MG/1
TABLET, FILM COATED ORAL
Qty: 6 TABLET | Refills: 0 | Status: SHIPPED | OUTPATIENT
Start: 2024-08-30 | End: 2024-09-04

## 2024-08-30 NOTE — TELEPHONE ENCOUNTER
Pt called to inform doctor that the antibiotic that she is on is upsetting her stomach. Pt is wondering if she can be switched to the Zpack instead.

## 2024-09-02 NOTE — PROGRESS NOTES
Assessment/Plan:    73 y/o woman with: acute sinusitis, hearing loss, dermatitis, MDD, protein calorie malnutrition and asthma. Will continue meds. Add debrox drops and restart flonase and refer to ENT. Will add steroid taper. Discussed supportive care and return parameters.     No problem-specific Assessment & Plan notes found for this encounter.       Diagnoses and all orders for this visit:    Acute sinusitis, recurrence not specified, unspecified location  -     carbamide peroxide (DEBROX) 6.5 % otic solution; Administer 5 drops into both ears 2 (two) times a day  -     doxycycline hyclate (VIBRA-TABS) 100 mg tablet; Take 1 tablet (100 mg total) by mouth 2 (two) times a day for 7 days  -     predniSONE 10 mg tablet; Take 4 tabs daily x3d then 3x3d then 2x3d then 1x3d then 0.5x3d then stop.  -     Ambulatory Referral to Otolaryngology; Future    Dermatitis  -     fluticasone (FLONASE) 50 mcg/act nasal spray; 2 sprays into each nostril daily    Uncomplicated asthma, unspecified asthma severity, unspecified whether persistent  -     fluticasone (FLONASE) 50 mcg/act nasal spray; 2 sprays into each nostril daily    Subconjunctival hemorrhage of left eye  -     fluticasone (FLONASE) 50 mcg/act nasal spray; 2 sprays into each nostril daily    Protein-calorie malnutrition, unspecified severity (HCC)  -     fluticasone (FLONASE) 50 mcg/act nasal spray; 2 sprays into each nostril daily    Recurrent major depressive disorder, in partial remission (HCC)  -     fluticasone (FLONASE) 50 mcg/act nasal spray; 2 sprays into each nostril daily    Injury of right knee, initial encounter  -     fluticasone (FLONASE) 50 mcg/act nasal spray; 2 sprays into each nostril daily    Depression, unspecified depression type  -     fluticasone (FLONASE) 50 mcg/act nasal spray; 2 sprays into each nostril daily    Vitamin D deficiency  -     fluticasone (FLONASE) 50 mcg/act nasal spray; 2 sprays into each nostril daily    Bilateral hearing  "loss, unspecified hearing loss type  -     Ambulatory Referral to Otolaryngology; Future          Subjective:     Chief Complaint   Patient presents with    Sinus Problem     Patient is complaining of nasal drip which won't go away.    Ear Fullness     Patient is complaining of ear blockage left ear, which has caused hearing loss. Patient states when she bends over she gets very dizzy. No further concerns, ng        Patient ID: Sylvie Sánchez is a 74 y.o. female.    Patient is a 73 y/o woman who presents complaining of cough congestion and sinus pressure with blocked ears and hearing loss intermittently, no fevers chills nausea or vomiting. Pt also with dermatitis, MDD, protein calorie malnutrition and asthma and otherwise admits being stable on meds.    Sinus Problem  Associated symptoms include congestion, coughing and sinus pressure.   Ear Fullness   Associated symptoms include coughing.       The following portions of the patient's history were reviewed and updated as appropriate: allergies, current medications, past family history, past medical history, past social history, past surgical history and problem list.    Review of Systems   Constitutional: Negative.    HENT:  Positive for congestion and sinus pressure.    Eyes: Negative.    Respiratory:  Positive for cough.    Cardiovascular: Negative.    Gastrointestinal: Negative.    Endocrine: Negative.    Genitourinary: Negative.    Musculoskeletal: Negative.    Allergic/Immunologic: Negative.    Neurological: Negative.    Hematological: Negative.    Psychiatric/Behavioral: Negative.     All other systems reviewed and are negative.        Objective:      /78 (BP Location: Left arm, Patient Position: Sitting, Cuff Size: Standard)   Pulse 82   Temp (!) 97.3 °F (36.3 °C) (Tympanic)   Ht 5' 5\" (1.651 m)   Wt 51.7 kg (114 lb)   LMP  (LMP Unknown)   SpO2 96%   BMI 18.97 kg/m²          Physical Exam  Constitutional:       Appearance: She is well-developed. "   HENT:      Head: Atraumatic.      Right Ear: External ear normal.      Left Ear: External ear normal.   Eyes:      Extraocular Movements: EOM normal.      Conjunctiva/sclera: Conjunctivae normal.      Pupils: Pupils are equal, round, and reactive to light.   Cardiovascular:      Rate and Rhythm: Normal rate and regular rhythm.      Heart sounds: Normal heart sounds.   Pulmonary:      Effort: Pulmonary effort is normal. No respiratory distress.      Breath sounds: Normal breath sounds.   Abdominal:      General: There is no distension.      Palpations: Abdomen is soft.      Tenderness: There is no abdominal tenderness. There is no guarding or rebound.   Musculoskeletal:         General: Normal range of motion.      Cervical back: Normal range of motion.   Skin:     General: Skin is warm and dry.   Neurological:      Mental Status: She is alert and oriented to person, place, and time.      Cranial Nerves: No cranial nerve deficit.   Psychiatric:         Mood and Affect: Mood and affect normal.         Behavior: Behavior normal.         Thought Content: Thought content normal.         Judgment: Judgment normal.

## 2024-09-04 ENCOUNTER — OFFICE VISIT (OUTPATIENT)
Dept: FAMILY MEDICINE CLINIC | Facility: CLINIC | Age: 74
End: 2024-09-04
Payer: MEDICARE

## 2024-09-04 ENCOUNTER — APPOINTMENT (OUTPATIENT)
Dept: LAB | Facility: MEDICAL CENTER | Age: 74
End: 2024-09-04
Payer: MEDICARE

## 2024-09-04 VITALS
HEIGHT: 65 IN | OXYGEN SATURATION: 98 % | RESPIRATION RATE: 16 BRPM | SYSTOLIC BLOOD PRESSURE: 122 MMHG | HEART RATE: 70 BPM | TEMPERATURE: 98.7 F | BODY MASS INDEX: 19.36 KG/M2 | WEIGHT: 116.2 LBS | DIASTOLIC BLOOD PRESSURE: 86 MMHG

## 2024-09-04 DIAGNOSIS — M54.50 CHRONIC BILATERAL LOW BACK PAIN WITHOUT SCIATICA: ICD-10-CM

## 2024-09-04 DIAGNOSIS — G89.29 CHRONIC BILATERAL LOW BACK PAIN WITHOUT SCIATICA: ICD-10-CM

## 2024-09-04 DIAGNOSIS — M54.2 CERVICALGIA: ICD-10-CM

## 2024-09-04 DIAGNOSIS — Z12.31 ENCOUNTER FOR SCREENING MAMMOGRAM FOR MALIGNANT NEOPLASM OF BREAST: ICD-10-CM

## 2024-09-04 DIAGNOSIS — Z12.31 ENCOUNTER FOR SCREENING MAMMOGRAM FOR MALIGNANT NEOPLASM OF BREAST: Primary | ICD-10-CM

## 2024-09-04 DIAGNOSIS — L30.9 DERMATITIS: ICD-10-CM

## 2024-09-04 DIAGNOSIS — Z83.430 FAMILY HISTORY OF ELEVATED LIPOPROTEIN(A): ICD-10-CM

## 2024-09-04 DIAGNOSIS — Z00.00 MEDICARE ANNUAL WELLNESS VISIT, SUBSEQUENT: ICD-10-CM

## 2024-09-04 DIAGNOSIS — Z78.0 POSTMENOPAUSAL: ICD-10-CM

## 2024-09-04 PROCEDURE — 99214 OFFICE O/P EST MOD 30 MIN: CPT | Performed by: FAMILY MEDICINE

## 2024-09-04 PROCEDURE — G0439 PPPS, SUBSEQ VISIT: HCPCS | Performed by: FAMILY MEDICINE

## 2024-09-04 RX ORDER — TRIAMCINOLONE ACETONIDE 1 MG/G
CREAM TOPICAL 2 TIMES DAILY
Qty: 30 G | Refills: 0 | Status: SHIPPED | OUTPATIENT
Start: 2024-09-04

## 2024-09-04 NOTE — PROGRESS NOTES
Assessment and Plan:     Problem List Items Addressed This Visit          Musculoskeletal and Integument    Dermatitis    Relevant Medications    triamcinolone (KENALOG) 0.1 % cream       Surgery/Wound/Pain    Cervicalgia    Relevant Orders    Ambulatory Referral to Chiropractic    Chronic bilateral low back pain without sciatica    Relevant Orders    Ambulatory Referral to Chiropractic     Other Visit Diagnoses       Encounter for screening mammogram for malignant neoplasm of breast    -  Primary    Relevant Orders    Mammo screening bilateral w 3d and cad          73 y/o woman with: dermatitis, cervicalgia, chronic low back pain along with Medicare AWV. Will continue meds. Will refer to chiropractor as well. Discussed supportive care and return parameters.     Regarding Medicare Annual well visit. Discussed various safety and health maintenance issues including healthy diet like the Mediterranean diet, exercise, ample sleep, stress reduction, and healthy weight as tolerated. Discussed supportive care and return parameters.        Preventive health issues were discussed with patient, and age appropriate screening tests were ordered as noted in patient's After Visit Summary.  Personalized health advice and appropriate referrals for health education or preventive services given if needed, as noted in patient's After Visit Summary.     History of Present Illness:     Patient presents for a Medicare Wellness Visit    Patient is a 73 y/o woman who presents for follow-up on dermatitis, cervicalgia, chronic low back pain. Pt admits being otherwise stable on meds. No fevers chills nausea or vomiting. Pt also here for Medicare AWV admits being active eats and sleeps well.       Patient Care Team:  Aiden Richmond MD as PCP - General  C William Riedel, DO Beth Valderrama, PA-C (Vascular Surgery)  Ulisses Ham MD (Vascular Surgery)     Review of Systems:     Review of Systems   Constitutional: Negative.    HENT:  Negative.     Eyes: Negative.    Respiratory: Negative.     Cardiovascular: Negative.    Gastrointestinal: Negative.    Endocrine: Negative.    Genitourinary: Negative.    Musculoskeletal: Negative.    Allergic/Immunologic: Negative.    Neurological: Negative.    Hematological: Negative.    Psychiatric/Behavioral: Negative.     All other systems reviewed and are negative.     Problem List:     Patient Active Problem List   Diagnosis    Recurrent major depressive disorder, in partial remission (HCC)    Pleural thickening    Symptomatic varicose veins of both lower extremities    Vitamin D deficiency    Changing skin lesion    Asthma    Dermatitis    Superficial phlebitis of leg, right    Left knee injury    Left shoulder pain    Depression    Right foot pain    RLQ abdominal tenderness    Bug bite    Protein-calorie malnutrition (HCC)    Wynne's neuroma of right foot    Chronic pain of right ankle    Right knee injury    Abnormal EKG    Preoperative cardiovascular examination    Petechiae    Hyperlipidemia    B12 deficiency    RUQ pain    Right flank pain    Chronic right shoulder pain    Acute sinusitis    Cervicalgia    Chronic bilateral low back pain without sciatica      Past Medical and Surgical History:     Past Medical History:   Diagnosis Date    Allergic 1999    penicillin,ragweed,cat,dog,mold,grass,moderate soy    Anxiety     Asthma     Allergy Induced    Breast cancer (HCC) 2003    left breast phyllodes    Cancer (HCC)     Left Breast CA    Palpitations      Past Surgical History:   Procedure Laterality Date    BREAST BIOPSY Right 2005    benign    BREAST LUMPECTOMY Left 2003    phyllodes    COLONOSCOPY      HI ENDOVEN ABLTJ INCMPTNT VEIN XTR LASER 1ST VEIN Right 9/14/2021    Procedure: ENDOVASCULAR LASER THERAPY (EVLT)W/ PHLEBECTOMY x19;  Surgeon: Ulisses Ham MD;  Location: [facility];  Service: Vascular    TONSILLECTOMY      WISDOM TOOTH EXTRACTION        Family History:     Family History   Problem  Relation Age of Onset    Uterine cancer Mother     Varicose Veins Mother     Hypertension Mother     Dysfunctional uterine bleeding Mother     Endometrial cancer Mother 83    COPD Father     Heart disease Father         Cardiac disorder    COPD Sister     Lung cancer Sister 65    Heart attack Brother     No Known Problems Maternal Grandmother     No Known Problems Maternal Grandfather     No Known Problems Paternal Grandmother     No Known Problems Paternal Grandfather     Diabetes Family       Social History:     Social History     Socioeconomic History    Marital status: Single     Spouse name: None    Number of children: None    Years of education: None    Highest education level: Some college, no degree   Occupational History    Occupation: retired   poostal service   Tobacco Use    Smoking status: Former     Current packs/day: 0.00     Average packs/day: 0.3 packs/day for 20.0 years (5.0 ttl pk-yrs)     Types: Cigarettes     Start date:      Quit date:      Years since quittin.6    Smokeless tobacco: Never   Vaping Use    Vaping status: Never Used   Substance and Sexual Activity    Alcohol use: Not Currently     Alcohol/week: 1.0 standard drink of alcohol     Types: 1 Cans of beer per week     Comment: rarely    Drug use: No    Sexual activity: Not Currently   Other Topics Concern    None   Social History Narrative    Always uses seatbelt        Caffeine use consumes 2 cups of coffee per day         Social Determinants of Health     Financial Resource Strain: Low Risk  (2023)    Overall Financial Resource Strain (CARDIA)     Difficulty of Paying Living Expenses: Not hard at all   Food Insecurity: No Food Insecurity (9/3/2024)    Hunger Vital Sign     Worried About Running Out of Food in the Last Year: Never true     Ran Out of Food in the Last Year: Never true   Transportation Needs: No Transportation Needs (9/3/2024)    PRAPARE - Transportation     Lack of Transportation (Medical): No      Lack of Transportation (Non-Medical): No   Physical Activity: Sufficiently Active (5/19/2021)    Exercise Vital Sign     Days of Exercise per Week: 7 days     Minutes of Exercise per Session: 30 min   Stress: No Stress Concern Present (5/19/2021)    [place] [employer] - Occupational Stress Questionnaire     Feeling of Stress : Only a little   Social Connections: Moderately Isolated (5/19/2021)    Social Connection and Isolation Panel [NHANES]     Frequency of Communication with Friends and Family: More than three times a week     Frequency of Social Gatherings with Friends and Family: Three times a week     Attends Shinto Services: More than 4 times per year     Active Member of Clubs or Organizations: No     Attends Club or Organization Meetings: Never     Marital Status:    Intimate Partner Violence: Not At Risk (5/19/2021)    Humiliation, Afraid, Rape, and Kick questionnaire     Fear of Current or Ex-Partner: No     Emotionally Abused: No     Physically Abused: No     Sexually Abused: No   Housing Stability: Low Risk  (9/3/2024)    Housing Stability Vital Sign     Unable to Pay for Housing in the Last Year: No     Number of Times Moved in the Last Year: 0     Homeless in the Last Year: No      Medications and Allergies:     Current Outpatient Medications   Medication Sig Dispense Refill    azithromycin (Zithromax) 250 mg tablet Take 2 tablets (500 mg total) by mouth daily for 1 day, THEN 1 tablet (250 mg total) daily for 4 days. 6 tablet 0    fluticasone (FLONASE) 50 mcg/act nasal spray 2 sprays into each nostril daily 16 mL 1    loratadine (Alavert) 10 MG dissolvable tablet Take 10 mg by mouth as needed       Polyethylene Glycol 3350 (MIRALAX PO) Take by mouth as needed       triamcinolone (KENALOG) 0.1 % cream Apply topically 2 (two) times a day 30 g 0    albuterol (PROVENTIL HFA,VENTOLIN HFA) 90 mcg/act inhaler Inhale 1 puff as needed for wheezing (Patient not taking: Reported  on 9/4/2024) 8.5 g 2    carbamide peroxide (DEBROX) 6.5 % otic solution Administer 5 drops into both ears 2 (two) times a day (Patient not taking: Reported on 9/4/2024) 15 mL 0    doxycycline hyclate (VIBRA-TABS) 100 mg tablet Take 1 tablet (100 mg total) by mouth 2 (two) times a day for 7 days (Patient not taking: Reported on 9/4/2024) 14 tablet 0    predniSONE 10 mg tablet Take 4 tabs daily x3d then 3x3d then 2x3d then 1x3d then 0.5x3d then stop. (Patient not taking: Reported on 9/4/2024) 40 tablet 0     Current Facility-Administered Medications   Medication Dose Route Frequency Provider Last Rate Last Admin    cyanocobalamin injection 1,000 mcg  1,000 mcg Intramuscular Q30 Days Aiden Richmond MD   1,000 mcg at 10/31/23 1013    cyanocobalamin injection 1,000 mcg  1,000 mcg Intramuscular Q30 Days Aiden Richmond MD   1,000 mcg at 11/16/22 1445    cyanocobalamin injection 1,000 mcg  1,000 mcg Intramuscular Q30 Days Aiden Richmond MD   1,000 mcg at 05/17/23 1247    cyanocobalamin injection 1,000 mcg  1,000 mcg Intramuscular Q30 Days Aiden Richmond MD   1,000 mcg at 06/19/23 1504    cyanocobalamin injection 1,000 mcg  1,000 mcg Intramuscular Q30 Days Aiden Richmond MD   1,000 mcg at 07/17/23 0909    cyanocobalamin injection 1,000 mcg  1,000 mcg Intramuscular Q30 Days Aiden Richmond MD   1,000 mcg at 09/25/23 1023     Allergies   Allergen Reactions    Wasp Venom Protein Hives    Ambrosia Artemisiifolia (Ragweed) Skin Test Allergic Rhinitis    Cat Hair Extract Allergic Rhinitis    Isoflavones (Soy) Rash    Mold Extract [Trichophyton] Allergic Rhinitis    Penicillins Rash      Immunizations:     Immunization History   Administered Date(s) Administered    COVID-19 MODERNA VACC 0.5 ML IM 03/17/2021, 04/14/2021, 12/10/2021    INFLUENZA 11/20/2012, 11/15/2013, 10/20/2014, 10/03/2017, 11/17/2021    Influenza, high dose seasonal 0.7 mL 10/16/2019, 11/18/2020, 11/17/2021, 11/16/2022    Influenza,  seasonal, injectable 11/02/2020    Tdap 11/11/2009, 08/22/2016      Health Maintenance:         Topic Date Due    Breast Cancer Screening: Mammogram  05/21/2023    Colorectal Cancer Screening  08/29/2026    Hepatitis C Screening  Completed         Topic Date Due    Pneumococcal Vaccine: 65+ Years (1 of 2 - PCV) Never done    COVID-19 Vaccine (4 - 2023-24 season) 09/01/2024    Influenza Vaccine (1) 09/01/2024      Medicare Screening Tests and Risk Assessments:     Sylvie is here for her Subsequent Wellness visit. Last Medicare Wellness visit information reviewed, patient interviewed and updates made to the record today.      Health Risk Assessment:   Patient rates overall health as good. Patient feels that their physical health rating is slightly worse. Patient is satisfied with their life. Eyesight was rated as same. Hearing was rated as same. Patient feels that their emotional and mental health rating is same. Patients states they are never, rarely angry. Patient states they are never, rarely unusually tired/fatigued. Pain experienced in the last 7 days has been some. Patient's pain rating has been 4/10. Patient states that she has experienced no weight loss or gain in last 6 months.     Depression Screening:   PHQ-9 Score: 0      Fall Risk Screening:   In the past year, patient has experienced: no history of falling in past year      Urinary Incontinence Screening:   Patient has not leaked urine accidently in the last six months.     Home Safety:  Patient does not have trouble with stairs inside or outside of their home. Patient has working smoke alarms and has working carbon monoxide detector. Home safety hazards include: none.     Nutrition:   Current diet is Regular.     Medications:   Patient is currently taking over-the-counter supplements. OTC medications include: see medication list. Patient is able to manage medications.     Activities of Daily Living (ADLs)/Instrumental Activities of Daily Living  (IADLs):   Walk and transfer into and out of bed and chair?: Yes  Dress and groom yourself?: Yes    Bathe or shower yourself?: Yes    Feed yourself? Yes  Do your laundry/housekeeping?: Yes  Manage your money, pay your bills and track your expenses?: Yes  Make your own meals?: Yes    Do your own shopping?: Yes    Previous Hospitalizations:   Any hospitalizations or ED visits within the last 12 months?: No      Advance Care Planning:   Living will: No    Durable POA for healthcare: No    Advanced directive: No      Cognitive Screening:   Provider or family/friend/caregiver concerned regarding cognition?: No    PREVENTIVE SCREENINGS      Cardiovascular Screening:    General: Screening Not Indicated and History Lipid Disorder      Diabetes Screening:     General: Risks and Benefits Discussed    Due for: Blood Glucose      Colorectal Cancer Screening:     General: Screening Current      Breast Cancer Screening:     General: History Breast Cancer      Cervical Cancer Screening:    General: Screening Not Indicated      Osteoporosis Screening:    General: Risks and Benefits Discussed    Due for: DXA Axial      Abdominal Aortic Aneurysm (AAA) Screening:        General: Screening Not Indicated      Lung Cancer Screening:     General: Screening Not Indicated      Hepatitis C Screening:    General: Screening Current    Screening, Brief Intervention, and Referral to Treatment (SBIRT)    Screening  Typical number of drinks in a day: 0  Typical number of drinks in a week: 0  Interpretation: Low risk drinking behavior.    AUDIT-C Screenin) How often did you have a drink containing alcohol in the past year? never  2) How many drinks did you have on a typical day when you were drinking in the past year? 0  3) How often did you have 6 or more drinks on one occasion in the past year? never    AUDIT-C Score: 0  Interpretation: Score 0-2 (female): Negative screen for alcohol misuse    Single Item Drug Screening:  How often have you  "used an illegal drug (including marijuana) or a prescription medication for non-medical reasons in the past year? never    Single Item Drug Screen Score: 0  Interpretation: Negative screen for possible drug use disorder    No results found.     Physical Exam:     /86 (BP Location: Right arm, Patient Position: Sitting, Cuff Size: Standard)   Pulse 70   Temp 98.7 °F (37.1 °C) (Temporal)   Resp 16   Ht 5' 5\" (1.651 m)   Wt 52.7 kg (116 lb 3.2 oz)   LMP  (LMP Unknown)   SpO2 98%   BMI 19.34 kg/m²     Physical Exam  Constitutional:       General: She is not in acute distress.     Appearance: She is well-developed. She is not diaphoretic.   HENT:      Head: Normocephalic and atraumatic.      Right Ear: External ear normal.      Left Ear: External ear normal.      Nose: Nose normal.      Mouth/Throat:      Mouth: Oropharynx is clear and moist.      Pharynx: No oropharyngeal exudate.   Eyes:      General:         Right eye: No discharge.         Left eye: No discharge.      Extraocular Movements: EOM normal.      Conjunctiva/sclera: Conjunctivae normal.      Pupils: Pupils are equal, round, and reactive to light.   Neck:      Thyroid: No thyromegaly.      Trachea: No tracheal deviation.   Cardiovascular:      Rate and Rhythm: Normal rate and regular rhythm.      Heart sounds: Normal heart sounds. No murmur heard.     No friction rub. No gallop.   Pulmonary:      Effort: Pulmonary effort is normal. No respiratory distress.      Breath sounds: Normal breath sounds.   Abdominal:      General: There is no distension.      Palpations: Abdomen is soft.      Tenderness: There is no abdominal tenderness. There is no guarding or rebound.   Musculoskeletal:         General: Normal range of motion.   Lymphadenopathy:      Cervical: No cervical adenopathy.   Skin:     General: Skin is warm.   Neurological:      Mental Status: She is alert and oriented to person, place, and time.      Cranial Nerves: No cranial nerve " deficit.   Psychiatric:         Mood and Affect: Mood and affect normal.         Behavior: Behavior normal.         Thought Content: Thought content normal.         Judgment: Judgment normal.          Aiden Richmond MD

## 2024-09-05 ENCOUNTER — APPOINTMENT (OUTPATIENT)
Dept: LAB | Facility: MEDICAL CENTER | Age: 74
End: 2024-09-05
Payer: MEDICARE

## 2024-09-05 LAB
ALBUMIN SERPL BCG-MCNC: 3.9 G/DL (ref 3.5–5)
ALP SERPL-CCNC: 77 U/L (ref 34–104)
ALT SERPL W P-5'-P-CCNC: 15 U/L (ref 7–52)
ANION GAP SERPL CALCULATED.3IONS-SCNC: 10 MMOL/L (ref 4–13)
AST SERPL W P-5'-P-CCNC: 16 U/L (ref 13–39)
BASOPHILS # BLD AUTO: 0.05 THOUSANDS/ΜL (ref 0–0.1)
BASOPHILS NFR BLD AUTO: 1 % (ref 0–1)
BILIRUB SERPL-MCNC: 0.5 MG/DL (ref 0.2–1)
BUN SERPL-MCNC: 8 MG/DL (ref 5–25)
CALCIUM SERPL-MCNC: 8.9 MG/DL (ref 8.4–10.2)
CHLORIDE SERPL-SCNC: 99 MMOL/L (ref 96–108)
CHOLEST SERPL-MCNC: 170 MG/DL
CO2 SERPL-SCNC: 26 MMOL/L (ref 21–32)
CREAT SERPL-MCNC: 0.61 MG/DL (ref 0.6–1.3)
EOSINOPHIL # BLD AUTO: 0.08 THOUSAND/ΜL (ref 0–0.61)
EOSINOPHIL NFR BLD AUTO: 1 % (ref 0–6)
ERYTHROCYTE [DISTWIDTH] IN BLOOD BY AUTOMATED COUNT: 13.1 % (ref 11.6–15.1)
GFR SERPL CREATININE-BSD FRML MDRD: 90 ML/MIN/1.73SQ M
GLUCOSE P FAST SERPL-MCNC: 93 MG/DL (ref 65–99)
HCT VFR BLD AUTO: 44.2 % (ref 34.8–46.1)
HDLC SERPL-MCNC: 77 MG/DL
HGB BLD-MCNC: 14.8 G/DL (ref 11.5–15.4)
IMM GRANULOCYTES # BLD AUTO: 0.03 THOUSAND/UL (ref 0–0.2)
IMM GRANULOCYTES NFR BLD AUTO: 0 % (ref 0–2)
LDLC SERPL CALC-MCNC: 76 MG/DL (ref 0–100)
LYMPHOCYTES # BLD AUTO: 1.66 THOUSANDS/ΜL (ref 0.6–4.47)
LYMPHOCYTES NFR BLD AUTO: 20 % (ref 14–44)
MCH RBC QN AUTO: 29.4 PG (ref 26.8–34.3)
MCHC RBC AUTO-ENTMCNC: 33.5 G/DL (ref 31.4–37.4)
MCV RBC AUTO: 88 FL (ref 82–98)
MONOCYTES # BLD AUTO: 0.69 THOUSAND/ΜL (ref 0.17–1.22)
MONOCYTES NFR BLD AUTO: 8 % (ref 4–12)
NEUTROPHILS # BLD AUTO: 6 THOUSANDS/ΜL (ref 1.85–7.62)
NEUTS SEG NFR BLD AUTO: 70 % (ref 43–75)
NRBC BLD AUTO-RTO: 0 /100 WBCS
PLATELET # BLD AUTO: 293 THOUSANDS/UL (ref 149–390)
PMV BLD AUTO: 10.8 FL (ref 8.9–12.7)
POTASSIUM SERPL-SCNC: 4.4 MMOL/L (ref 3.5–5.3)
PROT SERPL-MCNC: 6.9 G/DL (ref 6.4–8.4)
RBC # BLD AUTO: 5.04 MILLION/UL (ref 3.81–5.12)
SODIUM SERPL-SCNC: 135 MMOL/L (ref 135–147)
TRIGL SERPL-MCNC: 86 MG/DL
TSH SERPL DL<=0.05 MIU/L-ACNC: 0.59 UIU/ML (ref 0.45–4.5)
WBC # BLD AUTO: 8.51 THOUSAND/UL (ref 4.31–10.16)

## 2024-09-05 PROCEDURE — 80053 COMPREHEN METABOLIC PANEL: CPT

## 2024-09-05 PROCEDURE — 84443 ASSAY THYROID STIM HORMONE: CPT

## 2024-09-05 PROCEDURE — 36415 COLL VENOUS BLD VENIPUNCTURE: CPT

## 2024-09-05 PROCEDURE — 85025 COMPLETE CBC W/AUTO DIFF WBC: CPT

## 2024-09-05 PROCEDURE — 80061 LIPID PANEL: CPT

## 2024-09-09 ENCOUNTER — OFFICE VISIT (OUTPATIENT)
Age: 74
End: 2024-09-09
Payer: MEDICARE

## 2024-09-09 VITALS
WEIGHT: 116 LBS | HEIGHT: 65 IN | DIASTOLIC BLOOD PRESSURE: 58 MMHG | SYSTOLIC BLOOD PRESSURE: 102 MMHG | BODY MASS INDEX: 19.33 KG/M2

## 2024-09-09 DIAGNOSIS — M99.01 SEGMENTAL DYSFUNCTION OF CERVICAL REGION: ICD-10-CM

## 2024-09-09 DIAGNOSIS — M47.815 SPONDYLOSIS OF THORACOLUMBAR REGION WITHOUT MYELOPATHY OR RADICULOPATHY: ICD-10-CM

## 2024-09-09 DIAGNOSIS — M99.05 SEGMENTAL DYSFUNCTION OF PELVIC REGION: ICD-10-CM

## 2024-09-09 DIAGNOSIS — M99.04 SEGMENTAL DYSFUNCTION OF SACRAL REGION: ICD-10-CM

## 2024-09-09 DIAGNOSIS — M54.6 PAIN IN THORACIC SPINE: ICD-10-CM

## 2024-09-09 DIAGNOSIS — M99.03 SEGMENTAL DYSFUNCTION OF LUMBAR REGION: Primary | ICD-10-CM

## 2024-09-09 DIAGNOSIS — M54.2 CERVICALGIA: ICD-10-CM

## 2024-09-09 DIAGNOSIS — M51.36 DDD (DEGENERATIVE DISC DISEASE), LUMBAR: ICD-10-CM

## 2024-09-09 DIAGNOSIS — M54.50 ACUTE BILATERAL LOW BACK PAIN WITHOUT SCIATICA: ICD-10-CM

## 2024-09-09 PROBLEM — M51.369 DDD (DEGENERATIVE DISC DISEASE), LUMBAR: Status: ACTIVE | Noted: 2024-09-09

## 2024-09-09 PROCEDURE — 98941 CHIROPRACT MANJ 3-4 REGIONS: CPT | Performed by: CHIROPRACTOR

## 2024-09-09 PROCEDURE — 99202 OFFICE O/P NEW SF 15 MIN: CPT | Performed by: CHIROPRACTOR

## 2024-09-09 NOTE — PROGRESS NOTES
Initial chiropractic visit--9/9/24, visit #1    Acute corrective treatment      Assessment/Plan:    1. Segmental dysfunction of lumbar region        2. DDD (degenerative disc disease), lumbar        3. Segmental dysfunction of sacral region        4. Segmental dysfunction of pelvic region        5. Segmental dysfunction of cervical region        6. Acute bilateral low back pain without sciatica        7. Cervicalgia  Ambulatory Referral to Chiropractic      8. Spondylosis of thoracolumbar region        9. Pain in lower thoracic spine            Review of Diagnostic Studies and Office Notes:    The patient's CT scan images of the abdomen and pelvis were reviewed prior to the chiropractic evaluation today.  Visualize the bone windows.  Moderate/severe degenerative disc disease is noted in the upper lumbar spine and T12-L1 as well as in the lumbosacral region.  Severe degenerative disc disease is noted with vacuum phenomenon at L5-S1.  Also reviewed Dr. Richmond' note from 9/4/2024.    Decision and Treatment Plan:    I reviewed my findings with the patient today.  Patient was interested in receiving chiropractic care and was treated today.  We will treat the patient 2x/week for the next three weeks and re-evaluate. If there is improvement in symptoms and objective findings, frequency will be reduced.      The patient will be treated with conservative gentle chiropractic care including myofascial release, joint mobilization, distraction, a home stretching and icing program.  Practic techniques will be gentle mobilization techniques.    The patient was taught two stretches today. The patient was advised to do the stretch for 3 sets of 15-20 seconds several times per day.The need to stretch to the point of tension only was discussed.     Patient Instructions:    Return for treatment later this week.   Ice 15 minutes as needed for posttreatment soreness.  Otherwise can use moist heat to the thoracolumbar and lumbar region  15 minutes 2-3 times per day.  Stretching 2-3 times per day.    Goals:    GOALS: Improve patient's ability to tolerate prolonged physical activity    TIME/Reviewed with Patient:  Time:  At least 22 minutes of time was spent with the patient during the consultation including the patient's history/current complaints, physical exam, reviewing the diagnostic report, reviewing home instruction/reducing risk factors with the patient, reviewing my findings with the patient, and discussing the treatment with the patient.     This is a separate identifiable portion of today's visit from the E and M code billed.    Treatment today consisted of myofascial release to the cervical paraspinals, right right levator scapulae, rhomboids, right upper trapezius bilaterally.  Myofascial release was performed to the thoracolumbar paraspinals as well as lumbar paraspinals and quadratus lumborum bilaterally.    Gentle mobilization/manipulation was performed to the T10-11 L1-L2 levels utilizing gentle side-lying flexion distraction as well as to L5-S1 and the left sacroiliac joint utilizing side-lying flexion distraction and Muse drop technique.  Gentle stairstepping mobilization was performed the cervical spine at C5-6 and C4-5.    Review of Systems   Constitutional:  Negative for chills, fever and unexpected weight change.   Gastrointestinal:  Negative for constipation and diarrhea.   Genitourinary:  Negative for difficulty urinating and urgency.       Subjective:      Sylvie Sánchez is a 74 y.o. female who presents today for chiropractic evaluation and possible treatment.  She was referred over by her PCP, Dr. Richmond.  She has a chief complaint of pain in the thoracolumbar region as well as pain in the lumbosacral region.  She also complains of right-sided neck pain.  She states that symptoms began approximately 2 weeks ago.  She is an avid  and feels it possibly being doors and doing yard work/gardening may have been a  "contributing factor to starting her symptoms.  She has been to chiropractors in the past.  She is to see her brother Dr. Augustus Sánchez in [place] for many years but he unfortunately has been  for many years  She then started seeing Dr. Chen in [facility] and he retired so she has not been anywhere in a while.  She states she was a .  Chiropractic care was helpful to keep her doing her job. She states she also did do physical therapy in the past.  The symptoms she is having in the mid to lower back is new.    She points to the thoracolumbar region at the site of most of the pain.  She also points to the lumbosacral region she denies radiation of pain down her arms or legs, numbness or tingling in the upper or lower extremities.    She denies fever, chills, night sweats, recent unexplained weight loss, changes in bowel/bladder habits, urinary incontinence or retention.    Objective:     /58   Ht 5' 5\" (1.651 m)   Wt 52.6 kg (116 lb)   LMP  (LMP Unknown)   BMI 19.30 kg/m²     Appearance: Well developed, well nourished, and in no acute distress    Alert and oriented x 3    Mood/Affect: calm and pleasant    Gait/Posture:    Gait: Normal    Antalgic posture: Normal    Lordosis: Cervical- decreased    Lordosis: Lumbar- normal    Kyphosis: Thoracic-there is an increase in the lower thoracic kyphosis.    Upper extremity reflexes:    Deep tendon reflexes were normal and symmetrical in the upper extremities.    Brooks's was negative bilaterally.    Cervical Orthopedic Tests:    Maximal foraminal Compression on the Right: negative .    Maximal foraminal Compression on the Left: negative .    Active Cervical Ranges of Motion:    Cervical range of motion is full and pain-free with the exception of right rotation which is 20% restricted with pain.  Left lateral bending causes pulling on the right side.    Lower extremity reflexes:    Deep tendon reflexes were normal and symmetrical in the " bilateral lower extremities.    Babinski was negative bilaterally.    Lumbar Orthopedic Tests:    Supine Straight Leg Raise was negative  on the Right.    Supine Straight Leg Raise was negative  on the Left.    Active Lumbar Ranges of Motion:    Lumbar range of motion examination shows flexion to be 25% restricted with pain in the thoracolumbar region.  Extension is 25% restricted with pain in the lumbosacral region.  Left lateral bending is 25% restricted with pain in the thoracolumbar region.  Right lateral bending is 20% restricted with pain in the thoracic lumbar region.    Palpation:  Moderate/severe spasm and tenderness is noted in the thoracolumbar paraspinal musculature and lower thoracic paraspinals.  There is moderate spasm and tenderness in the lower lumbar paraspinals bilaterally as well as quadratus lumborum bilaterally.    Moderate spasm and tenderness is noted in the right upper trapezius, cervical paraspinals bilaterally as well as right levator scapula.    Decreased mobility and tenderness is noted at C5-6, C6-7 as well as T3-4, T5-6, T10-11, L1-L2, L5-S1, left sacroiliac joint.     Dictation voice to text software has been used in the creation of this document. Please consider this in light of any contextual or grammatical errors.

## 2024-09-12 ENCOUNTER — PROCEDURE VISIT (OUTPATIENT)
Age: 74
End: 2024-09-12
Payer: MEDICARE

## 2024-09-12 DIAGNOSIS — M99.05 SEGMENTAL DYSFUNCTION OF PELVIC REGION: ICD-10-CM

## 2024-09-12 DIAGNOSIS — M99.01 SEGMENTAL DYSFUNCTION OF CERVICAL REGION: ICD-10-CM

## 2024-09-12 DIAGNOSIS — M51.36 DDD (DEGENERATIVE DISC DISEASE), LUMBAR: ICD-10-CM

## 2024-09-12 DIAGNOSIS — M54.50 ACUTE BILATERAL LOW BACK PAIN WITHOUT SCIATICA: ICD-10-CM

## 2024-09-12 DIAGNOSIS — M99.03 SEGMENTAL DYSFUNCTION OF LUMBAR REGION: Primary | ICD-10-CM

## 2024-09-12 DIAGNOSIS — M47.815 SPONDYLOSIS OF THORACOLUMBAR REGION WITHOUT MYELOPATHY OR RADICULOPATHY: ICD-10-CM

## 2024-09-12 DIAGNOSIS — M54.6 PAIN IN THORACIC SPINE: ICD-10-CM

## 2024-09-12 DIAGNOSIS — M99.04 SEGMENTAL DYSFUNCTION OF SACRAL REGION: ICD-10-CM

## 2024-09-12 DIAGNOSIS — M54.2 CERVICALGIA: ICD-10-CM

## 2024-09-12 PROCEDURE — 98941 CHIROPRACT MANJ 3-4 REGIONS: CPT | Performed by: CHIROPRACTOR

## 2024-09-12 NOTE — PROGRESS NOTES
Initial chiropractic visit--9/9/24, visit #2    Acute corrective treatment     Assessment:     1. Segmental dysfunction of lumbar region        2. DDD (degenerative disc disease), lumbar        3. Segmental dysfunction of sacral region        4. Segmental dysfunction of pelvic region        5. Segmental dysfunction of cervical region        6. Acute bilateral low back pain without sciatica        7. Cervicalgia        8. Spondylosis of thoracolumbar region        9. Pain in lower thoracic spine            Condition is the same    PLAN/TREATMENT:  Treatment today consisted of myofascial release to the cervical paraspinals, right right levator scapulae, rhomboids, right upper trapezius bilaterally.  Myofascial release was performed to the thoracolumbar paraspinals as well as lumbar paraspinals and quadratus lumborum bilaterally.     Gentle mobilization/manipulation was performed to the T10-11 L1-L2 levels utilizing gentle side-lying flexion distraction as well as to L5-S1 and the left sacroiliac joint utilizing side-lying flexion distraction and Muse drop technique.  Gentle stairstepping mobilization was performed the cervical spine at C5-6 and C4-5.    Return for treatment twice next week.   Continue using ice as needed for post treatment soreness.   Continue stretching.    Subjective:    Sylvie is here today with ongoing complaints of neck pain and LBP.  Had some mild soreness temporarily after last visit, but feels a little better today.     Objective:  Moderate/severe spasm and tenderness is noted in the thoracolumbar paraspinal musculature and lower thoracic paraspinals.  There is moderate spasm and tenderness in the lower lumbar paraspinals bilaterally as well as quadratus lumborum bilaterally.     Moderate spasm and tenderness is noted in the right upper trapezius, cervical paraspinals bilaterally as well as right levator scapula.     Decreased mobility and tenderness is noted at C5-6, C6-7 as well as  T3-4, T5-6, T10-11, L1-L2, L5-S1, left sacroiliac joint.

## 2024-09-16 ENCOUNTER — PROCEDURE VISIT (OUTPATIENT)
Age: 74
End: 2024-09-16
Payer: MEDICARE

## 2024-09-16 VITALS — BODY MASS INDEX: 18.89 KG/M2 | WEIGHT: 113.4 LBS | HEIGHT: 65 IN

## 2024-09-16 DIAGNOSIS — M47.815 SPONDYLOSIS OF THORACOLUMBAR REGION WITHOUT MYELOPATHY OR RADICULOPATHY: ICD-10-CM

## 2024-09-16 DIAGNOSIS — M54.2 CERVICALGIA: ICD-10-CM

## 2024-09-16 DIAGNOSIS — M99.01 SEGMENTAL DYSFUNCTION OF CERVICAL REGION: ICD-10-CM

## 2024-09-16 DIAGNOSIS — M51.36 DDD (DEGENERATIVE DISC DISEASE), LUMBAR: ICD-10-CM

## 2024-09-16 DIAGNOSIS — M99.05 SEGMENTAL DYSFUNCTION OF PELVIC REGION: ICD-10-CM

## 2024-09-16 DIAGNOSIS — M54.50 ACUTE BILATERAL LOW BACK PAIN WITHOUT SCIATICA: ICD-10-CM

## 2024-09-16 DIAGNOSIS — M99.03 SEGMENTAL DYSFUNCTION OF LUMBAR REGION: Primary | ICD-10-CM

## 2024-09-16 DIAGNOSIS — M54.6 PAIN IN THORACIC SPINE: ICD-10-CM

## 2024-09-16 DIAGNOSIS — M99.04 SEGMENTAL DYSFUNCTION OF SACRAL REGION: ICD-10-CM

## 2024-09-16 PROCEDURE — 98941 CHIROPRACT MANJ 3-4 REGIONS: CPT | Performed by: CHIROPRACTOR

## 2024-09-16 NOTE — PROGRESS NOTES
Initial chiropractic visit--9/9/24, visit #3    Acute corrective treatment       Assessment:     1. Segmental dysfunction of lumbar region        2. DDD (degenerative disc disease), lumbar        3. Segmental dysfunction of sacral region        4. Segmental dysfunction of pelvic region        5. Segmental dysfunction of cervical region        6. Acute bilateral low back pain without sciatica        7. Cervicalgia        8. Spondylosis of thoracolumbar region        9. Pain in lower thoracic spine              Condition is improving.    PLAN/TREATMENT:  Treatment today consisted of myofascial release to the cervical paraspinals, right right levator scapulae, rhomboids, right upper trapezius bilaterally.  Myofascial release was performed to the thoracolumbar paraspinals as well as lumbar paraspinals and quadratus lumborum bilaterally.     Gentle mobilization/manipulation was performed to the T10-11 L1-L2 levels utilizing gentle side-lying flexion distraction as well as to L5-S1 and the left sacroiliac joint utilizing side-lying flexion distraction and Muse drop technique.  Gentle stairstepping mobilization was performed the cervical spine at C5-6 and C4-5.    Return for treatment later this week.   Continue using ice as needed for post treatment soreness.   Continue stretching.    Subjective:    Sylvie is here today with ongoing complaints of neck pain and back pain.  Neck isn't as bad.  LB is a little better.  Having pain in mid back.      Objective:  Moderate spasm and tenderness is noted in the thoracolumbar paraspinal musculature and lower thoracic paraspinals.  There is moderate spasm and tenderness in the lower lumbar paraspinals bilaterally as well as quadratus lumborum bilaterally.     Moderate spasm and tenderness is noted in the right upper trapezius, cervical paraspinals bilaterally as well as right levator scapula and rhomboids.     Decreased mobility and tenderness is noted at C5-6, C6-7 as well as  T3-4, T5-6, T10-11, L1-L2, L5-S1, left sacroiliac joint.

## 2024-09-18 ENCOUNTER — PROCEDURE VISIT (OUTPATIENT)
Age: 74
End: 2024-09-18
Payer: MEDICARE

## 2024-09-18 VITALS — BODY MASS INDEX: 18.83 KG/M2 | WEIGHT: 113 LBS | HEIGHT: 65 IN

## 2024-09-18 DIAGNOSIS — M54.2 CERVICALGIA: ICD-10-CM

## 2024-09-18 DIAGNOSIS — M47.815 SPONDYLOSIS OF THORACOLUMBAR REGION WITHOUT MYELOPATHY OR RADICULOPATHY: ICD-10-CM

## 2024-09-18 DIAGNOSIS — M79.18 MYOFASCIAL PAIN: ICD-10-CM

## 2024-09-18 DIAGNOSIS — M54.6 PAIN IN THORACIC SPINE: ICD-10-CM

## 2024-09-18 DIAGNOSIS — M54.50 ACUTE BILATERAL LOW BACK PAIN WITHOUT SCIATICA: ICD-10-CM

## 2024-09-18 DIAGNOSIS — M99.03 SEGMENTAL DYSFUNCTION OF LUMBAR REGION: Primary | ICD-10-CM

## 2024-09-18 DIAGNOSIS — M99.04 SEGMENTAL DYSFUNCTION OF SACRAL REGION: ICD-10-CM

## 2024-09-18 DIAGNOSIS — M99.01 SEGMENTAL DYSFUNCTION OF CERVICAL REGION: ICD-10-CM

## 2024-09-18 DIAGNOSIS — M51.36 DDD (DEGENERATIVE DISC DISEASE), LUMBAR: ICD-10-CM

## 2024-09-18 DIAGNOSIS — M99.05 SEGMENTAL DYSFUNCTION OF PELVIC REGION: ICD-10-CM

## 2024-09-18 PROCEDURE — 98941 CHIROPRACT MANJ 3-4 REGIONS: CPT | Performed by: CHIROPRACTOR

## 2024-09-18 NOTE — PROGRESS NOTES
Initial chiropractic visit--9/9/24, visit #4    Acute corrective treatment       Assessment:     1. Segmental dysfunction of lumbar region        2. DDD (degenerative disc disease), lumbar        3. Segmental dysfunction of sacral region        4. Segmental dysfunction of pelvic region        5. Segmental dysfunction of cervical region        6. Acute bilateral low back pain without sciatica        7. Cervicalgia        8. Spondylosis of thoracolumbar region        9. Pain in lower thoracic spine        10. Myofascial pain              Condition is improving.    PLAN/TREATMENT:  Treatment today consisted of myofascial release to the cervical paraspinals, right right levator scapulae, rhomboids, right upper trapezius bilaterally.  Myofascial release was performed to the thoracolumbar paraspinals as well as lumbar paraspinals and quadratus lumborum bilaterally.     Gentle mobilization/manipulation was performed to the T10-11 L1-L2 levels utilizing gentle side-lying flexion distraction as well as to L5-S1 and the left sacroiliac joint utilizing side-lying flexion distraction and Muse drop technique.  Gentle stairstepping mobilization was performed the cervical spine at C5-6 and C4-5.    Return for treatment twice next week.   Continue using ice as needed for post treatment soreness.   Continue stretching.    Subjective:    Sylvie is here today with ongoing complaints of neck pain and back pain.  Neck and LB are doing better. Neck is 75% better, LB is 60% better.   Having pain in mid/upper back pain more on the right around the right shoulder blade.  Started after she was cleaning the floor between switching in a new washer/dryer.        Objective:  Mild/moderate spasm and tenderness is noted in the thoracolumbar paraspinal musculature and lower thoracic paraspinals.  There is mild/moderate spasm and tenderness in the lower lumbar paraspinals bilaterally as well as quadratus lumborum bilaterally.     Moderate spasm  and tenderness is noted in the right upper trapezius, cervical paraspinals bilaterally as well as right levator scapula and rhomboids. TP in right rhomboids reproduces RUB pain when palpated.      Decreased mobility and tenderness is noted at C5-6, C6-7 as well as T3-4, T5-6, T10-11, L1-L2, L5-S1, left sacroiliac joint.

## 2024-09-18 NOTE — PROGRESS NOTES
Assessment:     1. Segmental dysfunction of lumbar region        2. DDD (degenerative disc disease), lumbar        3. Segmental dysfunction of sacral region        4. Segmental dysfunction of pelvic region        5. Segmental dysfunction of cervical region        6. Acute bilateral low back pain without sciatica        7. Cervicalgia        8. Spondylosis of thoracolumbar region        9. Pain in lower thoracic spine                Condition is improving.    PLAN/TREATMENT:  Treatment today consisted of myofascial release to the cervical paraspinals, right right levator scapulae, rhomboids, right upper trapezius bilaterally.  Myofascial release was performed to the thoracolumbar paraspinals as well as lumbar paraspinals and quadratus lumborum bilaterally.     Gentle mobilization/manipulation was performed to the T10-11 L1-L2 levels utilizing gentle side-lying flexion distraction as well as to L5-S1 and the left sacroiliac joint utilizing side-lying flexion distraction and Muse drop technique.  Gentle stairstepping mobilization was performed the cervical spine at C5-6 and C4-5.    No follow-ups on file.   Continue using ice as needed for post treatment soreness.   Continue stretching.    Subjective:    Sylvie is here today with ongoing complaints of neck pain and back pain.  Neck isn't as bad.  LB is a little better.  Having pain in mid back.      Objective:  Moderate spasm and tenderness is noted in the thoracolumbar paraspinal musculature and lower thoracic paraspinals.  There is moderate spasm and tenderness in the lower lumbar paraspinals bilaterally as well as quadratus lumborum bilaterally.     Moderate spasm and tenderness is noted in the right upper trapezius, cervical paraspinals bilaterally as well as right levator scapula and rhomboids.     Decreased mobility and tenderness is noted at C5-6, C6-7 as well as T3-4, T5-6, T10-11, L1-L2, L5-S1, left sacroiliac joint.

## 2024-09-23 ENCOUNTER — PROCEDURE VISIT (OUTPATIENT)
Age: 74
End: 2024-09-23
Payer: MEDICARE

## 2024-09-23 VITALS — WEIGHT: 113 LBS | HEIGHT: 65 IN | BODY MASS INDEX: 18.83 KG/M2

## 2024-09-23 DIAGNOSIS — M54.2 CERVICALGIA: ICD-10-CM

## 2024-09-23 DIAGNOSIS — M99.05 SEGMENTAL DYSFUNCTION OF PELVIC REGION: ICD-10-CM

## 2024-09-23 DIAGNOSIS — M51.36 DDD (DEGENERATIVE DISC DISEASE), LUMBAR: ICD-10-CM

## 2024-09-23 DIAGNOSIS — M99.03 SEGMENTAL DYSFUNCTION OF LUMBAR REGION: Primary | ICD-10-CM

## 2024-09-23 DIAGNOSIS — M54.6 PAIN IN THORACIC SPINE: ICD-10-CM

## 2024-09-23 DIAGNOSIS — M79.18 MYOFASCIAL PAIN: ICD-10-CM

## 2024-09-23 DIAGNOSIS — M47.815 SPONDYLOSIS OF THORACOLUMBAR REGION WITHOUT MYELOPATHY OR RADICULOPATHY: ICD-10-CM

## 2024-09-23 DIAGNOSIS — M51.369 DDD (DEGENERATIVE DISC DISEASE), LUMBAR: ICD-10-CM

## 2024-09-23 DIAGNOSIS — M54.50 ACUTE BILATERAL LOW BACK PAIN WITHOUT SCIATICA: ICD-10-CM

## 2024-09-23 DIAGNOSIS — M99.04 SEGMENTAL DYSFUNCTION OF SACRAL REGION: ICD-10-CM

## 2024-09-23 DIAGNOSIS — M99.01 SEGMENTAL DYSFUNCTION OF CERVICAL REGION: ICD-10-CM

## 2024-09-23 PROCEDURE — 98941 CHIROPRACT MANJ 3-4 REGIONS: CPT | Performed by: CHIROPRACTOR

## 2024-09-23 NOTE — PROGRESS NOTES
Assessment:     1. Segmental dysfunction of lumbar region        2. DDD (degenerative disc disease), lumbar        3. Segmental dysfunction of sacral region        4. Segmental dysfunction of pelvic region        5. Segmental dysfunction of cervical region        6. Acute bilateral low back pain without sciatica        7. Cervicalgia        8. Spondylosis of thoracolumbar region        9. Pain in lower thoracic spine        10. Myofascial pain          Condition is improving.    PLAN/TREATMENT:  Treatment today consisted of myofascial release to the cervical paraspinals, right right levator scapulae, rhomboids, right upper trapezius bilaterally.  Myofascial release was performed to the thoracolumbar paraspinals as well as lumbar paraspinals and quadratus lumborum bilaterally.     Gentle mobilization/manipulation was performed to the T10-11 L1-L2 levels utilizing gentle side-lying flexion distraction as well as to L5-S1 and the left sacroiliac joint utilizing side-lying flexion distraction and Muse drop technique.  Gentle stairstepping mobilization was performed the cervical spine at C5-6 and C4-5.    Return for treatment later this week.   Continue using ice as needed for post treatment soreness.   Continue stretching.    Subjective:  Sylvie is here today with ongoing complaints of neck pain and back pain.  Neck is sore and tight. LB is a lot better. Having less mid back pain also.    Objective:  Mild/moderate spasm and tenderness is noted in the thoracolumbar paraspinal musculature and lower thoracic paraspinals.  There is moderate spasm and tenderness in the lower lumbar paraspinals bilaterally as well as quadratus lumborum bilaterally.     Moderate spasm and tenderness is noted in the right upper trapezius, cervical paraspinals bilaterally as well as right levator scapula and rhomboids.     Decreased mobility and tenderness is noted at C5-6, C6-7 as well as T3-4, T5-6, T10-11, L1-L2, L5-S1, left sacroiliac  joint.

## 2024-09-27 ENCOUNTER — PROCEDURE VISIT (OUTPATIENT)
Age: 74
End: 2024-09-27
Payer: MEDICARE

## 2024-09-27 VITALS — BODY MASS INDEX: 18.83 KG/M2 | WEIGHT: 113 LBS | HEIGHT: 65 IN

## 2024-09-27 DIAGNOSIS — M79.18 MYOFASCIAL PAIN: ICD-10-CM

## 2024-09-27 DIAGNOSIS — M54.50 ACUTE BILATERAL LOW BACK PAIN WITHOUT SCIATICA: ICD-10-CM

## 2024-09-27 DIAGNOSIS — M99.03 SEGMENTAL DYSFUNCTION OF LUMBAR REGION: Primary | ICD-10-CM

## 2024-09-27 DIAGNOSIS — M99.04 SEGMENTAL DYSFUNCTION OF SACRAL REGION: ICD-10-CM

## 2024-09-27 DIAGNOSIS — M47.815 SPONDYLOSIS OF THORACOLUMBAR REGION WITHOUT MYELOPATHY OR RADICULOPATHY: ICD-10-CM

## 2024-09-27 DIAGNOSIS — M54.6 PAIN IN THORACIC SPINE: ICD-10-CM

## 2024-09-27 DIAGNOSIS — M54.2 CERVICALGIA: ICD-10-CM

## 2024-09-27 DIAGNOSIS — M99.05 SEGMENTAL DYSFUNCTION OF PELVIC REGION: ICD-10-CM

## 2024-09-27 DIAGNOSIS — M51.36 DDD (DEGENERATIVE DISC DISEASE), LUMBAR: ICD-10-CM

## 2024-09-27 DIAGNOSIS — M51.369 DDD (DEGENERATIVE DISC DISEASE), LUMBAR: ICD-10-CM

## 2024-09-27 DIAGNOSIS — M99.01 SEGMENTAL DYSFUNCTION OF CERVICAL REGION: ICD-10-CM

## 2024-09-27 PROCEDURE — 98941 CHIROPRACT MANJ 3-4 REGIONS: CPT | Performed by: CHIROPRACTOR

## 2024-09-27 NOTE — PROGRESS NOTES
Assessment:     1. Segmental dysfunction of lumbar region        2. DDD (degenerative disc disease), lumbar        3. Segmental dysfunction of sacral region        4. Segmental dysfunction of pelvic region        5. Segmental dysfunction of cervical region        6. Acute bilateral low back pain without sciatica        7. Cervicalgia        8. Spondylosis of thoracolumbar region        9. Pain in lower thoracic spine        10. Myofascial pain          Condition is improving.    PLAN/TREATMENT:  Treatment today consisted of myofascial release to the cervical paraspinals, right right levator scapulae, rhomboids, right upper trapezius bilaterally.  Myofascial release was performed to the thoracolumbar paraspinals as well as lumbar paraspinals and quadratus lumborum bilaterally.     Gentle mobilization/manipulation was performed to the T10-11 L1-L2 levels utilizing gentle as well as to L5-S1 and the left sacroiliac joint utilizing side-lying flexion distraction and Muse drop technique.  Gentle stairstepping mobilization was performed the cervical spine at C5-6 and C4-5.     Return for treatment once next week.  Will treat once per week for the next 3 weeks and reevaluate.  Continue using ice as needed for post treatment soreness.   Continue stretching.    Subjective:  Sylvie is here today with ongoing complaints of neck pain and back pain.  Was doing well before today.  Was doing some yard work today and mid back is bothering her.  Was raking and picking up branches.  After doing so she felt tightness and stiffness in her mid and upper back.  The neck is still doing fairly well with just some mild tightness as is the lower back.      Objective:  Mild/moderate spasm and tenderness is noted in the mid thoracic paraspinal musculature and lower thoracic paraspinals.  There is moderate spasm and tenderness in the lower lumbar paraspinals bilaterally as well as quadratus lumborum bilaterally.     Moderate spasm and  tenderness is noted in the right upper trapezius, cervical paraspinals bilaterally as well as right levator scapula and rhomboids.     Decreased mobility and tenderness is noted at C5-6, C6-7 as well as T3-4, T5-6, T10-11, L1-L2, L5-S1, left sacroiliac joint.

## 2024-09-28 PROBLEM — J01.90 ACUTE SINUSITIS: Status: RESOLVED | Noted: 2024-08-29 | Resolved: 2024-09-28

## 2024-10-02 ENCOUNTER — PROCEDURE VISIT (OUTPATIENT)
Age: 74
End: 2024-10-02

## 2024-10-02 VITALS — WEIGHT: 113 LBS | HEIGHT: 65 IN | BODY MASS INDEX: 18.83 KG/M2

## 2024-10-02 DIAGNOSIS — M99.03 SEGMENTAL DYSFUNCTION OF LUMBAR REGION: Primary | ICD-10-CM

## 2024-10-02 DIAGNOSIS — M99.04 SEGMENTAL DYSFUNCTION OF SACRAL REGION: ICD-10-CM

## 2024-10-02 DIAGNOSIS — M99.05 SEGMENTAL DYSFUNCTION OF PELVIC REGION: ICD-10-CM

## 2024-10-02 DIAGNOSIS — M79.18 MYOFASCIAL PAIN: ICD-10-CM

## 2024-10-02 DIAGNOSIS — M51.360 DEGENERATION OF INTERVERTEBRAL DISC OF LUMBAR REGION WITH DISCOGENIC BACK PAIN: ICD-10-CM

## 2024-10-02 DIAGNOSIS — M54.6 PAIN IN THORACIC SPINE: ICD-10-CM

## 2024-10-02 DIAGNOSIS — M99.01 SEGMENTAL DYSFUNCTION OF CERVICAL REGION: ICD-10-CM

## 2024-10-02 DIAGNOSIS — M47.815 SPONDYLOSIS OF THORACOLUMBAR REGION WITHOUT MYELOPATHY OR RADICULOPATHY: ICD-10-CM

## 2024-10-02 DIAGNOSIS — M54.50 ACUTE BILATERAL LOW BACK PAIN WITHOUT SCIATICA: ICD-10-CM

## 2024-10-02 DIAGNOSIS — M54.2 CERVICALGIA: ICD-10-CM

## 2024-10-02 NOTE — PROGRESS NOTES
Assessment:     1. Segmental dysfunction of lumbar region        2. Degeneration of intervertebral disc of lumbar region with discogenic back pain        3. Segmental dysfunction of sacral region        4. Segmental dysfunction of pelvic region        5. Segmental dysfunction of cervical region        6. Acute bilateral low back pain without sciatica        7. Cervicalgia        8. Spondylosis of thoracolumbar region        9. Pain in lower thoracic spine        10. Myofascial pain            Condition is progressing.    PLAN/TREATMENT:  Treatment today consisted of myofascial release to the cervical paraspinals, right right levator scapulae, rhomboids, right upper trapezius bilaterally.  Myofascial release was performed to the thoracolumbar paraspinals as well as lumbar paraspinals and quadratus lumborum bilaterally.     Gentle mobilization/manipulation was performed to the T10-11 L1-L2 levels utilizing gentle as well as to L5-S1 and the left sacroiliac joint utilizing side-lying flexion distraction and Muse drop technique.  Gentle stairstepping mobilization was performed the cervical spine at C5-6 and C4-5.     Return for treatment once next week.  Taper treatment off further.    Continue using ice as needed for post treatment soreness.   Continue stretching.    Subjective:  Sylvie is here today with ongoing complaints of neck pain and back pain. She is feeling much better overall. Having mild pain in mid/low back.    Objective:  Mild spasm and tenderness is noted in the mid thoracic paraspinal musculature and lower thoracic paraspinals.  There is mild/moderate spasm and tenderness in the lower lumbar paraspinals bilaterally as well as quadratus lumborum bilaterally.     Mild/moderate spasm and tenderness is noted in the right upper trapezius, cervical paraspinals bilaterally as well as right levator scapula and rhomboids.     Decreased mobility and tenderness is noted at C5-6, C6-7 as well as T3-4, T5-6,  T10-11, L1-L2, L5-S1, left sacroiliac joint.

## 2024-10-04 PROBLEM — Z00.00 MEDICARE ANNUAL WELLNESS VISIT, SUBSEQUENT: Status: RESOLVED | Noted: 2019-04-12 | Resolved: 2024-10-04

## 2024-10-09 ENCOUNTER — PROCEDURE VISIT (OUTPATIENT)
Age: 74
End: 2024-10-09

## 2024-10-09 VITALS — HEIGHT: 65 IN | WEIGHT: 115 LBS | BODY MASS INDEX: 19.16 KG/M2

## 2024-10-09 DIAGNOSIS — M79.18 MYOFASCIAL PAIN: ICD-10-CM

## 2024-10-09 DIAGNOSIS — M47.815 SPONDYLOSIS OF THORACOLUMBAR REGION WITHOUT MYELOPATHY OR RADICULOPATHY: ICD-10-CM

## 2024-10-09 DIAGNOSIS — M51.360 DEGENERATION OF INTERVERTEBRAL DISC OF LUMBAR REGION WITH DISCOGENIC BACK PAIN: ICD-10-CM

## 2024-10-09 DIAGNOSIS — M99.05 SEGMENTAL DYSFUNCTION OF PELVIC REGION: ICD-10-CM

## 2024-10-09 DIAGNOSIS — M99.01 SEGMENTAL DYSFUNCTION OF CERVICAL REGION: ICD-10-CM

## 2024-10-09 DIAGNOSIS — M54.50 ACUTE BILATERAL LOW BACK PAIN WITHOUT SCIATICA: ICD-10-CM

## 2024-10-09 DIAGNOSIS — M99.04 SEGMENTAL DYSFUNCTION OF SACRAL REGION: ICD-10-CM

## 2024-10-09 DIAGNOSIS — M54.2 CERVICALGIA: ICD-10-CM

## 2024-10-09 DIAGNOSIS — M99.03 SEGMENTAL DYSFUNCTION OF LUMBAR REGION: Primary | ICD-10-CM

## 2024-10-09 DIAGNOSIS — M54.6 PAIN IN THORACIC SPINE: ICD-10-CM

## 2024-10-09 NOTE — PROGRESS NOTES
Assessment:     1. Segmental dysfunction of lumbar region        2. Degeneration of intervertebral disc of lumbar region with discogenic back pain        3. Segmental dysfunction of sacral region        4. Segmental dysfunction of pelvic region        5. Segmental dysfunction of cervical region        6. Acute bilateral low back pain without sciatica        7. Cervicalgia        8. Spondylosis of thoracolumbar region        9. Pain in lower thoracic spine        10. Myofascial pain            PLAN/TREATMENT:  Treatment today consisted of myofascial release to the cervical paraspinals, right right levator scapulae, rhomboids, right upper trapezius bilaterally.  Myofascial release was performed to the thoracolumbar paraspinals as well as lumbar paraspinals and quadratus lumborum bilaterally.     Gentle mobilization/manipulation was performed to the T10-11 L1-L2 levels utilizing gentle as well as to L5-S1 and the left sacroiliac joint utilizing side-lying flexion distraction and Muse drop technique.  Gentle stairstepping mobilization was performed the cervical spine at C5-6 and C4-5.     Return for treatment once next week.      Continue stretching.    Subjective:  Sylvie is here today with ongoing complaints of neck pain and back pain. She is feeling better overall. LB was sore yesterday after doing a lot of work around the house a few days ago but improved.  Neck is stiff from cleaning windows today.      Objective:  Mild spasm and tenderness is noted in the mid thoracic paraspinal musculature and lower thoracic paraspinals.  There is mild/moderate spasm and tenderness in the lower lumbar paraspinals bilaterally as well as quadratus lumborum bilaterally.     Mild/moderate spasm and tenderness is noted in the right upper trapezius, cervical paraspinals bilaterally as well as levator scapula and rhomboids.     Decreased mobility and tenderness is noted at C5-6, C6-7 as well as T3-4, T5-6, T10-11, L1-L2, L5-S1,  left sacroiliac joint.

## 2024-10-17 ENCOUNTER — PROCEDURE VISIT (OUTPATIENT)
Age: 74
End: 2024-10-17
Payer: MEDICARE

## 2024-10-17 VITALS — WEIGHT: 115.8 LBS | BODY MASS INDEX: 19.29 KG/M2 | HEIGHT: 65 IN

## 2024-10-17 DIAGNOSIS — M99.01 SEGMENTAL DYSFUNCTION OF CERVICAL REGION: ICD-10-CM

## 2024-10-17 DIAGNOSIS — M99.04 SEGMENTAL DYSFUNCTION OF SACRAL REGION: ICD-10-CM

## 2024-10-17 DIAGNOSIS — M54.59 MECHANICAL LOW BACK PAIN: ICD-10-CM

## 2024-10-17 DIAGNOSIS — M99.03 SEGMENTAL DYSFUNCTION OF LUMBAR REGION: Primary | ICD-10-CM

## 2024-10-17 DIAGNOSIS — M54.2 CERVICALGIA: ICD-10-CM

## 2024-10-17 DIAGNOSIS — M99.05 SEGMENTAL DYSFUNCTION OF PELVIC REGION: ICD-10-CM

## 2024-10-17 DIAGNOSIS — M54.6 PAIN IN THORACIC SPINE: ICD-10-CM

## 2024-10-17 DIAGNOSIS — M79.18 MYOFASCIAL PAIN: ICD-10-CM

## 2024-10-17 DIAGNOSIS — M51.360 DEGENERATION OF INTERVERTEBRAL DISC OF LUMBAR REGION WITH DISCOGENIC BACK PAIN: ICD-10-CM

## 2024-10-17 DIAGNOSIS — M47.815 SPONDYLOSIS OF THORACOLUMBAR REGION WITHOUT MYELOPATHY OR RADICULOPATHY: ICD-10-CM

## 2024-10-17 PROCEDURE — 98941 CHIROPRACT MANJ 3-4 REGIONS: CPT | Performed by: CHIROPRACTOR

## 2024-10-17 NOTE — PROGRESS NOTES
Assessment:     1. Segmental dysfunction of lumbar region        2. Degeneration of intervertebral disc of lumbar region with discogenic back pain        3. Segmental dysfunction of sacral region        4. Segmental dysfunction of pelvic region        5. Segmental dysfunction of cervical region        6. Cervicalgia        7. Spondylosis of thoracolumbar region        8. Pain in lower thoracic spine        9. Myofascial pain        10. Mechanical low back pain            PLAN/TREATMENT:  Treatment today consisted of myofascial release to the cervical paraspinals, right right levator scapulae, rhomboids, right upper trapezius bilaterally.  Myofascial release was performed to the thoracolumbar paraspinals as well as lumbar paraspinals and quadratus lumborum bilaterally.     Gentle mobilization/manipulation was performed to the T10-11 L1-L2 levels utilizing gentle as well as to L5-S1 and the left sacroiliac joint utilizing side-lying flexion distraction and Muse drop technique.  Gentle stairstepping mobilization was performed the cervical spine at C5-6 and C4-5.     Return for treatment in 1-2 weeks.      Continue stretching.    Subjective:  Sylvie is here today with ongoing complaints of neck pain and back pain. She is feeling better overall.     Objective:  Mild spasm and tenderness is noted in the mid thoracic paraspinal musculature and lower thoracic paraspinals.  There is mild/moderate spasm and tenderness in the lower lumbar paraspinals bilaterally as well as quadratus lumborum bilaterally.     Mild spasm and tenderness is noted in the right upper trapezius, cervical paraspinals bilaterally as well as levator scapula and rhomboids.     Decreased mobility and tenderness is noted at C5-6, C6-7 as well as T3-4, T5-6, T10-11, L1-L2, L5-S1, left sacroiliac joint.

## 2024-10-21 ENCOUNTER — HOSPITAL ENCOUNTER (OUTPATIENT)
Dept: BONE DENSITY | Facility: MEDICAL CENTER | Age: 74
Discharge: HOME | End: 2024-10-21
Payer: MEDICARE

## 2024-10-21 DIAGNOSIS — Z78.0 POSTMENOPAUSAL: ICD-10-CM

## 2024-10-21 PROCEDURE — 77080 DXA BONE DENSITY AXIAL: CPT

## 2024-10-25 ENCOUNTER — PROCEDURE VISIT (OUTPATIENT)
Age: 74
End: 2024-10-25
Payer: MEDICARE

## 2024-10-25 VITALS — BODY MASS INDEX: 19.63 KG/M2 | WEIGHT: 115 LBS | HEIGHT: 64 IN

## 2024-10-25 DIAGNOSIS — M54.6 PAIN IN THORACIC SPINE: ICD-10-CM

## 2024-10-25 DIAGNOSIS — M54.2 CERVICALGIA: ICD-10-CM

## 2024-10-25 DIAGNOSIS — M47.815 SPONDYLOSIS OF THORACOLUMBAR REGION WITHOUT MYELOPATHY OR RADICULOPATHY: ICD-10-CM

## 2024-10-25 DIAGNOSIS — M99.01 SEGMENTAL DYSFUNCTION OF CERVICAL REGION: ICD-10-CM

## 2024-10-25 DIAGNOSIS — M99.05 SEGMENTAL DYSFUNCTION OF PELVIC REGION: ICD-10-CM

## 2024-10-25 DIAGNOSIS — M99.04 SEGMENTAL DYSFUNCTION OF SACRAL REGION: ICD-10-CM

## 2024-10-25 DIAGNOSIS — M99.03 SEGMENTAL DYSFUNCTION OF LUMBAR REGION: Primary | ICD-10-CM

## 2024-10-25 DIAGNOSIS — M51.360 DEGENERATION OF INTERVERTEBRAL DISC OF LUMBAR REGION WITH DISCOGENIC BACK PAIN: ICD-10-CM

## 2024-10-25 DIAGNOSIS — M54.59 MECHANICAL LOW BACK PAIN: ICD-10-CM

## 2024-10-25 DIAGNOSIS — M79.18 MYOFASCIAL PAIN: ICD-10-CM

## 2024-10-25 PROCEDURE — 98941 CHIROPRACT MANJ 3-4 REGIONS: CPT | Performed by: CHIROPRACTOR

## 2024-10-25 NOTE — PROGRESS NOTES
Initial chiropractic visit--9/9/24, visit #10    Acute corrective treatment     Assessment:     1. Segmental dysfunction of lumbar region        2. Degeneration of intervertebral disc of lumbar region with discogenic back pain        3. Segmental dysfunction of sacral region        4. Segmental dysfunction of pelvic region        5. Segmental dysfunction of cervical region        6. Cervicalgia        7. Spondylosis of thoracolumbar region        8. Pain in lower thoracic spine        9. Myofascial pain        10. Mechanical low back pain          Improving.    PLAN/TREATMENT:  Treatment today consisted of myofascial release to the cervical paraspinals, right right levator scapulae, rhomboids, right upper trapezius bilaterally.  Myofascial release was performed to the thoracolumbar paraspinals as well as lumbar paraspinals and quadratus lumborum bilaterally.     Gentle mobilization/manipulation was performed to the T10-11 L1-L2 levels utilizing gentle as well as to L5-S1 and the left sacroiliac joint utilizing side-lying flexion distraction and Muse drop technique.  Gentle stairstepping mobilization was performed the cervical spine at C5-6 and C4-5.     Return for treatment in 1-2 weeks.  Reduce frequency.     Continue stretching.    Subjective:  Sylvie is here today with ongoing complaints of neck pain and back pain. She is feeling better overall.     Objective:  Mild spasm and tenderness is noted in the mid thoracic paraspinal musculature and lower thoracic paraspinals.  There is mild/moderate spasm and tenderness in the lower lumbar paraspinals bilaterally as well as quadratus lumborum bilaterally.     Mild spasm and tenderness is noted in the right upper trapezius, cervical paraspinals bilaterally as well as levator scapula and rhomboids.     Decreased mobility and tenderness is noted at C5-6, C6-7 as well as T3-4, T5-6, T10-11, L1-L2, L5-S1, left sacroiliac joint.

## 2024-11-07 ENCOUNTER — PROCEDURE VISIT (OUTPATIENT)
Age: 74
End: 2024-11-07
Payer: MEDICARE

## 2024-11-07 VITALS — HEIGHT: 65 IN | WEIGHT: 115 LBS | BODY MASS INDEX: 19.16 KG/M2

## 2024-11-07 DIAGNOSIS — M79.18 MYOFASCIAL PAIN: ICD-10-CM

## 2024-11-07 DIAGNOSIS — M51.360 DEGENERATION OF INTERVERTEBRAL DISC OF LUMBAR REGION WITH DISCOGENIC BACK PAIN: ICD-10-CM

## 2024-11-07 DIAGNOSIS — M99.03 SEGMENTAL DYSFUNCTION OF LUMBAR REGION: Primary | ICD-10-CM

## 2024-11-07 DIAGNOSIS — M47.815 SPONDYLOSIS OF THORACOLUMBAR REGION WITHOUT MYELOPATHY OR RADICULOPATHY: ICD-10-CM

## 2024-11-07 DIAGNOSIS — M99.01 SEGMENTAL DYSFUNCTION OF CERVICAL REGION: ICD-10-CM

## 2024-11-07 DIAGNOSIS — M54.59 MECHANICAL LOW BACK PAIN: ICD-10-CM

## 2024-11-07 DIAGNOSIS — M25.512 ACUTE PAIN OF LEFT SHOULDER: ICD-10-CM

## 2024-11-07 DIAGNOSIS — M99.05 SEGMENTAL DYSFUNCTION OF PELVIC REGION: ICD-10-CM

## 2024-11-07 DIAGNOSIS — M99.04 SEGMENTAL DYSFUNCTION OF SACRAL REGION: ICD-10-CM

## 2024-11-07 DIAGNOSIS — M54.6 PAIN IN THORACIC SPINE: ICD-10-CM

## 2024-11-07 PROCEDURE — 98941 CHIROPRACT MANJ 3-4 REGIONS: CPT | Performed by: CHIROPRACTOR

## 2024-11-07 NOTE — PROGRESS NOTES
Initial chiropractic visit--9/9/24, visit #11    Acute corrective treatment     Assessment:     1. Segmental dysfunction of lumbar region        2. Degeneration of intervertebral disc of lumbar region with discogenic back pain        3. Segmental dysfunction of sacral region        4. Segmental dysfunction of pelvic region        5. Segmental dysfunction of cervical region        6. Spondylosis of thoracolumbar region        7. Pain in lower thoracic spine        8. Myofascial pain        9. Mechanical low back pain        10. Acute pain of left shoulder  Ambulatory Referral to Orthopedic Surgery          Improving.    PLAN/TREATMENT:  Treatment today consisted of myofascial release to the cervical paraspinals, right right levator scapulae, rhomboids, right upper trapezius bilaterally.  Myofascial release was performed to the thoracolumbar paraspinals as well as lumbar paraspinals and quadratus lumborum bilaterally.     Gentle mobilization/manipulation was performed to the T10-11 L1-L2 levels utilizing gentle as well as to L5-S1 and the left sacroiliac joint utilizing side-lying flexion distraction and Muse drop technique.  Gentle stairstepping mobilization was performed the cervical spine at C5-6 and C4-5.     Pulsed (20%) ultrasound to the left anterior lateral shoulder was performed for 2 minutes at 1.2 W/cm².    Return for treatment in 1-2 weeks.    Referred to our orthopedic PA, Jonathan Stahl PA-C regarding left shoulder pain.  She states she would like to hold off and try ice rest and over-the-counter medication to see if the shoulder pain goes away.  If it does not she will make an appointment with Jonathan.    Continue stretching.  Ice shoulder 15 minutes 2-3 times per day.    Subjective:  Sylvie is here today with complaints of neck pain and back pain. She is feeling better overall regarding those areas.  Has mild stiffness and tightness in the neck/upper back on the right as well as lower back at times.    Having pain in the left shoulder today.  She developed it after painting a shed.  She was painting with the right hand but holding the paint with the left for four hours or so.  Left shoulder hurts with certain movements.  Denies radiation of pain down the arm.  Denies numbness or tingling in the upper extremities.    Objective:  Mild spasm and tenderness is noted in the mid thoracic paraspinal musculature and lower thoracic paraspinals.  There is mild/moderate spasm and tenderness in the lower lumbar paraspinals bilaterally as well as quadratus lumborum bilaterally.     Mild spasm and tenderness is noted in the right upper trapezius, cervical paraspinals bilaterally as well as levator scapula and rhomboids.     Decreased mobility and tenderness is noted at C5-6, C6-7 as well as T3-4, T5-6, T10-11, L1-L2, L5-S1, left sacroiliac joint.     Cervical range of motion is not painful.  Spurling's is negative bilaterally.  Left shoulder range of motion is mildly limited in abduction above 140 degrees with pain in the anterior shoulder.  External rotation is also painful.  Internal rotation is not painful.  Mild palpable tenderness at the anterior shoulder.

## 2024-12-26 ENCOUNTER — OFFICE VISIT (OUTPATIENT)
Age: 74
End: 2024-12-26
Payer: MEDICARE

## 2024-12-26 VITALS
HEART RATE: 68 BPM | OXYGEN SATURATION: 98 % | HEIGHT: 64 IN | RESPIRATION RATE: 16 BRPM | WEIGHT: 121 LBS | SYSTOLIC BLOOD PRESSURE: 114 MMHG | DIASTOLIC BLOOD PRESSURE: 78 MMHG | TEMPERATURE: 97.8 F | BODY MASS INDEX: 20.66 KG/M2

## 2024-12-26 DIAGNOSIS — L01.00 IMPETIGO: ICD-10-CM

## 2024-12-26 DIAGNOSIS — H10.32 ACUTE BACTERIAL CONJUNCTIVITIS OF LEFT EYE: Primary | ICD-10-CM

## 2024-12-26 PROCEDURE — G2211 COMPLEX E/M VISIT ADD ON: HCPCS | Performed by: STUDENT IN AN ORGANIZED HEALTH CARE EDUCATION/TRAINING PROGRAM

## 2024-12-26 PROCEDURE — 99214 OFFICE O/P EST MOD 30 MIN: CPT | Performed by: STUDENT IN AN ORGANIZED HEALTH CARE EDUCATION/TRAINING PROGRAM

## 2024-12-26 RX ORDER — MUPIROCIN 20 MG/G
OINTMENT TOPICAL 2 TIMES DAILY
Qty: 30 G | Refills: 0 | Status: SHIPPED | OUTPATIENT
Start: 2024-12-26

## 2024-12-26 RX ORDER — POLYMYXIN B SULFATE AND TRIMETHOPRIM 1; 10000 MG/ML; [USP'U]/ML
1 SOLUTION OPHTHALMIC EVERY 6 HOURS
Qty: 10 ML | Refills: 0 | Status: SHIPPED | OUTPATIENT
Start: 2024-12-26 | End: 2025-01-02

## 2024-12-26 NOTE — PROGRESS NOTES
Name: Sylvie Sánchez      : 1950      MRN: [id number]  Encounter Provider: Grabiel Chou MD  Encounter Date: 2024   Encounter department: [facility] PRIMARY CARE  :  Assessment & Plan  Acute bacterial conjunctivitis of left eye    Orders:  •  polymyxin b-trimethoprim (POLYTRIM) ophthalmic solution; Administer 1 drop into the left eye every 6 (six) hours for 7 days  Given worsening symptoms, will treat patient with Polytrim antibiotic as indicated above.  Patient does report history of allergies and advised patient to follow-up with office if symptoms worsen or fail to improve.  Advised patient to avoid touching the eye as well as maintaining distance to prevent spread.  Patient expressed understanding.  Impetigo    Orders:  •  mupirocin (BACTROBAN) 2 % ointment; Apply topically 2 (two) times a day  Given history of allergies, may be allergic reaction however appearance does look like impetigo.  Prescription for mupirocin ointment sent to pharmacy as indicated above, advised patient to follow-up with office if symptoms worsen or fail to improve.       History of Present Illness     Rash  This is a new problem. The current episode started in the past 7 days. The problem has been gradually worsening since onset. The affected locations include the head and face. The rash is characterized by pain. Associated with: Tide pods. The rash first occurred at home. Pertinent negatives include no anorexia, congestion, cough, diarrhea, fatigue, fever, itching, joint pain, shortness of breath or sore throat. Past treatments include nothing. Her past medical history is significant for allergies and asthma. There is no history of eczema. There were no sick contacts.   Eye Problem   The left eye is affected. The current episode started in the past 7 days. The problem has been gradually worsening. There was no injury mechanism. The patient is experiencing no pain. There is No known exposure to pink eye. She  "Does not wear contacts. Associated symptoms include an eye discharge (clear), eye redness, a foreign body sensation and itching. Pertinent negatives include no blurred vision, fever or nausea. She has tried nothing for the symptoms.     Review of Systems   Constitutional:  Negative for fatigue and fever.   HENT:  Negative for congestion and sore throat.    Eyes:  Positive for discharge (clear), redness and itching. Negative for blurred vision.   Respiratory:  Negative for cough and shortness of breath.    Gastrointestinal:  Negative for anorexia, diarrhea and nausea.   Musculoskeletal:  Negative for joint pain.   Skin:  Negative for itching.       Objective   /78 (BP Location: Left arm, Patient Position: Sitting, Cuff Size: Standard)   Pulse 68   Temp 97.8 °F (36.6 °C) (Temporal)   Resp 16   Ht 5' 4\" (1.626 m)   Wt 54.9 kg (121 lb)   LMP  (LMP Unknown)   SpO2 98%   BMI 20.77 kg/m²      Physical Exam  Constitutional:       General: She is not in acute distress.     Appearance: Normal appearance. She is not ill-appearing.   HENT:      Head: Normocephalic and atraumatic.   Eyes:      General:         Left eye: No foreign body, discharge or hordeolum.      Extraocular Movements: Extraocular movements intact.      Right eye: Normal extraocular motion.      Left eye: Normal extraocular motion.      Conjunctiva/sclera:      Right eye: Right conjunctiva is not injected. No exudate.     Left eye: Left conjunctiva is injected. No exudate.     Pupils: Pupils are equal, round, and reactive to light.   Cardiovascular:      Rate and Rhythm: Normal rate and regular rhythm.      Heart sounds: Normal heart sounds. No murmur heard.  Pulmonary:      Effort: Pulmonary effort is normal. No respiratory distress.      Breath sounds: Normal breath sounds.   Abdominal:      Palpations: Abdomen is soft.      Tenderness: There is no abdominal tenderness.   Musculoskeletal:      Right lower leg: No edema.      Left lower leg: No " edema.   Skin:     Findings: Rash (Impetigo like rash noted on forehead) present.   Neurological:      Mental Status: She is alert.          Statement Selected

## 2024-12-27 ENCOUNTER — TELEPHONE (OUTPATIENT)
Age: 74
End: 2024-12-27

## 2024-12-27 DIAGNOSIS — T78.40XA ALLERGIC REACTION, INITIAL ENCOUNTER: Primary | ICD-10-CM

## 2024-12-27 RX ORDER — METHYLPREDNISOLONE 4 MG/1
TABLET ORAL
Qty: 21 EACH | Refills: 0 | Status: SHIPPED | OUTPATIENT
Start: 2024-12-27

## 2024-12-27 NOTE — TELEPHONE ENCOUNTER
Prescription for Medrol Dosepak sent to pharmacy given patient's history and concern of allergic reaction.  If symptoms worsen or fail to improve, recommend patient go to ED for urgent ophthalmologic evaluation.

## 2024-12-27 NOTE — TELEPHONE ENCOUNTER
Pt called and said that her pink eye has worsened. It was 2 hours before she could get the eye to open today. The blisters are all around her eye and top of her head. She says they are painful.    She would like to know if there is something else she could try and how to proceed.    Please advise and follow up with patient.

## 2024-12-30 ENCOUNTER — HOSPITAL ENCOUNTER (EMERGENCY)
Facility: HOSPITAL | Age: 74
Discharge: HOME | End: 2024-12-30
Attending: EMERGENCY MEDICINE
Payer: MEDICARE

## 2024-12-30 VITALS
OXYGEN SATURATION: 96 % | DIASTOLIC BLOOD PRESSURE: 81 MMHG | WEIGHT: 116.18 LBS | SYSTOLIC BLOOD PRESSURE: 111 MMHG | HEART RATE: 94 BPM | BODY MASS INDEX: 19.94 KG/M2 | RESPIRATION RATE: 16 BRPM | TEMPERATURE: 98.4 F

## 2024-12-30 DIAGNOSIS — B02.31 HERPES ZOSTER CONJUNCTIVITIS: Primary | ICD-10-CM

## 2024-12-30 PROCEDURE — 99284 EMERGENCY DEPT VISIT MOD MDM: CPT | Performed by: EMERGENCY MEDICINE

## 2024-12-30 PROCEDURE — 99283 EMERGENCY DEPT VISIT LOW MDM: CPT

## 2024-12-30 RX ORDER — VALACYCLOVIR HYDROCHLORIDE 500 MG/1
1000 TABLET, FILM COATED ORAL EVERY 8 HOURS SCHEDULED
Status: DISCONTINUED | OUTPATIENT
Start: 2024-12-30 | End: 2024-12-30 | Stop reason: HOSPADM

## 2024-12-30 RX ORDER — VALACYCLOVIR HYDROCHLORIDE 1 G/1
1000 TABLET, FILM COATED ORAL 3 TIMES DAILY
Qty: 20 TABLET | Refills: 0 | Status: SHIPPED | OUTPATIENT
Start: 2024-12-30 | End: 2025-01-06

## 2024-12-30 RX ORDER — VALACYCLOVIR HYDROCHLORIDE 500 MG/1
1000 TABLET, FILM COATED ORAL EVERY 8 HOURS SCHEDULED
Status: DISCONTINUED | OUTPATIENT
Start: 2024-12-30 | End: 2024-12-30

## 2024-12-30 RX ADMIN — VALACYCLOVIR 1000 MG: 500 TABLET, FILM COATED ORAL at 12:10

## 2024-12-30 NOTE — ED ATTENDING ATTESTATION
12/30/2024  IMilton Seale DO Dr[place], saw and evaluated the patient. I have discussed the patient with the resident/non-physician practitioner and agree with the resident's/non-physician practitioner's findings, Plan of Care, and MDM as documented in the resident's/non-physician practitioner's note, except where noted. All available labs and Radiology studies were reviewed.  I was present for key portions of any procedure(s) performed by the resident/non-physician practitioner and I was immediately available to provide assistance.       At this point I agree with the current assessment done in the Emergency Department.  I have conducted an independent evaluation of this patient a history and physical is as follows:    74-year-old female presenting with worsening rash to the forehead noticed around.  Has seen ophthalmology.  Denying any visual difficulties or pain with moving her eye.  On evaluation she is resting comfortably.  She has blister type swelling inferior to the left eye.  There is scleral irritation no hyphema or hypopyon.  Extraocular motions intact.  Does not have any pain with moving her eye.  Rash concerning for herpes zoster ophthalmicus.  Not consistent with postseptal cellulitis.  Believe that she would benefit from antiviral.  Will discuss with ophthalmology as well.  Patient is not immunocompromised so do not believe that she would need admission for IV antivirals.  Believe that she could be discharged with p.o. antivirals with close ophthalmology follow-up.    Resident did discuss with ophthalmology office and was able to arrange outpatient follow-up for tomorrow morning.  Given valacyclovir prescription with first dose here in the emergency department.      ED Course         Critical Care Time  Procedures

## 2024-12-30 NOTE — ED PROVIDER NOTES
Time reflects when diagnosis was documented in both MDM as applicable and the Disposition within this note       Time User Action Codes Description Comment    12/30/2024 11:20 AM Ivan Haro Add [B02.31] Herpes zoster conjunctivitis           ED Disposition       ED Disposition   Discharge    Condition   Stable    Date/Time   Mon Dec 30, 2024 11:20 AM    Comment   Sylvie Sánchez discharge to home/self care.                   Assessment & Plan       Medical Decision Making  Sylvie Sánchez is a 74 yr old female who presents today for a rash on her face and swelling of her left eye. Patient reports she noticed a little cut on her forehead on Tuesday and the rash broke out on Thursday. Pt saw her PCP on Thursday in which she was given polytrim eye solution and mupirocin ointment. Patient reports itchiness and uncomfort over the rash. Patient denies any vision changes and vision pain. On physical exam, there is a dark red rash over the forehead extending to her nose and left cheek. There is conjunctivitis in the left eye and a blister under the eye. Pt reports increased tearing in the left eye.     Impression: Cellulitis, Shingles, preorbital cellulitis     Due to the nature of the rash, likely etiology is shingles. Patient given Valtrex 1g TID for 7 days and appointment to [facility] made for 12/31 at 8:30am. Patient denies vision changes and pain today.     Risk  Prescription drug management.             Medications   valACYclovir (VALTREX) tablet 1,000 mg (has no administration in time range)       ED Risk Strat Scores                  SBIRT 20yo+      Flowsheet Row Most Recent Value   Initial Alcohol Screen: US AUDIT-C     1. How often do you have a drink containing alcohol? 1 Filed at: 12/30/2024 0956   2. How many drinks containing alcohol do you have on a typical day you are drinking?  1 Filed at: 12/30/2024 0956   3a. Male UNDER 65: How often do you have five or more drinks on one  occasion? 0 Filed at: 12/30/2024 0956   3b. FEMALE Any Age, or MALE 65+: How often do you have 4 or more drinks on one occassion? 0 Filed at: 12/30/2024 0956   Audit-C Score 2 Filed at: 12/30/2024 0956   MARY: How many times in the past year have you...    Used an illegal drug or used a prescription medication for non-medical reasons? Never Filed at: 12/30/2024 0956                            History of Present Illness       Chief Complaint   Patient presents with    Facial Swelling     Pt reports forehead and left eye redness and swelling since Thursday but worse today.        Past Medical History:   Diagnosis Date    Allergic 1999    penicillin,ragweed,cat,dog,mold,grass,moderate soy    Allergic rhinitis When I was 12 years old    Anxiety     Asthma     Allergy Induced    Breast cancer (HCC) 2003    left breast phyllodes    Cancer (HCC)     Left Breast CA    Dental disease I have dentures    Ear problems When I was 12 years old    Otitis media 2017    Palpitations       Past Surgical History:   Procedure Laterality Date    BREAST BIOPSY Right 2005    benign    BREAST LUMPECTOMY Left 2003    phyllodes    COLONOSCOPY      NE ENDOVEN ABLTJ INCMPTNT VEIN XTR LASER 1ST VEIN Right 9/14/2021    Procedure: ENDOVASCULAR LASER THERAPY (EVLT)W/ PHLEBECTOMY x19;  Surgeon: Ulisses Ham MD;  Location: [facility];  Service: Vascular    TONSILLECTOMY      WISDOM TOOTH EXTRACTION        Family History   Problem Relation Age of Onset    Uterine cancer Mother     Varicose Veins Mother     Hypertension Mother     Dysfunctional uterine bleeding Mother     Endometrial cancer Mother 83    Cancer Mother         Uterus    COPD Father     Heart disease Father         Cardiac disorder    Asthma Father     Rashes / Skin problems Father     COPD Sister     Lung cancer Sister 65    Heart attack Brother     No Known Problems Maternal Grandmother     No Known Problems Maternal Grandfather     No Known Problems Paternal Grandmother     No Known  Problems Paternal Grandfather     Diabetes Family       Social History     Tobacco Use    Smoking status: Former     Current packs/day: 0.00     Average packs/day: 0.3 packs/day for 20.0 years (5.0 ttl pk-yrs)     Types: Cigarettes     Start date: 1990     Quit date: 2010     Years since quitting: 15.0    Smokeless tobacco: Never   Vaping Use    Vaping status: Never Used   Substance Use Topics    Alcohol use: Not Currently     Alcohol/week: 1.0 standard drink of alcohol     Types: 1 Cans of beer per week     Comment: rarely    Drug use: No      E-Cigarette/Vaping    E-Cigarette Use Never User       E-Cigarette/Vaping Substances    Nicotine No     THC No     CBD No     Flavoring No     Other No     Unknown No       I have reviewed and agree with the history as documented.     HPI    Review of Systems   Constitutional:  Negative for chills and fever.   HENT:  Positive for facial swelling. Negative for ear pain, hearing loss and sore throat.    Eyes:  Positive for redness. Negative for pain, discharge and visual disturbance.   Respiratory:  Negative for cough and shortness of breath.    Cardiovascular:  Negative for chest pain and palpitations.   Gastrointestinal:  Negative for abdominal pain and vomiting.   Genitourinary:  Negative for dysuria and hematuria.   Musculoskeletal:  Negative for arthralgias and back pain.   Skin:  Positive for rash.   Neurological:  Positive for numbness (over rash on forhead). Negative for seizures and syncope.   All other systems reviewed and are negative.          Objective       ED Triage Vitals   Temperature Pulse Blood Pressure Respirations SpO2 Patient Position - Orthostatic VS   12/30/24 0933 12/30/24 0933 12/30/24 0933 12/30/24 0933 12/30/24 0933 12/30/24 1000   98.4 °F (36.9 °C) 104 132/62 20 96 % Sitting      Temp Source Heart Rate Source BP Location FiO2 (%) Pain Score    12/30/24 0933 12/30/24 0933 12/30/24 0933 -- --    Oral Monitor Right arm        Vitals      Date and Time  Temp Pulse SpO2 Resp BP Pain Score FACES Pain Rating User   12/30/24 1000 -- 94 96 % 16 111/81 -- -- EG   12/30/24 0933 98.4 °F (36.9 °C) 104 96 % 20 132/62 -- -- HMR            Physical Exam  Vitals and nursing note reviewed.   Constitutional:       General: She is not in acute distress.     Appearance: She is well-developed.   HENT:      Head: Normocephalic and atraumatic.      Mouth/Throat:      Mouth: Mucous membranes are moist.      Pharynx: Oropharynx is clear.   Eyes:      General:         Right eye: No discharge.      Comments: Left eye conjunctivitis. No discharge noted out of eye  R eye wnl    Cardiovascular:      Rate and Rhythm: Normal rate and regular rhythm.      Heart sounds: No murmur heard.  Pulmonary:      Effort: Pulmonary effort is normal. No respiratory distress.      Breath sounds: Normal breath sounds.   Abdominal:      Palpations: Abdomen is soft.      Tenderness: There is no abdominal tenderness.   Musculoskeletal:         General: No swelling.      Cervical back: Neck supple.   Skin:     General: Skin is warm and dry.      Capillary Refill: Capillary refill takes less than 2 seconds.      Findings: Rash (over forhead extending into left cheek and nos) present.   Neurological:      Mental Status: She is alert.   Psychiatric:         Mood and Affect: Mood normal.         Results Reviewed       None            No orders to display       Procedures    ED Medication and Procedure Management   Prior to Admission Medications   Prescriptions Last Dose Informant Patient Reported? Taking?   Polyethylene Glycol 3350 (MIRALAX PO)  Self Yes No   Sig: Take by mouth as needed    albuterol (PROVENTIL HFA,VENTOLIN HFA) 90 mcg/act inhaler   No No   Sig: Inhale 1 puff as needed for wheezing   Patient not taking: Reported on 9/4/2024   carbamide peroxide (DEBROX) 6.5 % otic solution   No No   Sig: Administer 5 drops into both ears 2 (two) times a day   Patient not taking: Reported on 9/4/2024   fluticasone  (FLONASE) 50 mcg/act nasal spray   No No   Si sprays into each nostril daily   loratadine (Alavert) 10 MG dissolvable tablet  Self Yes No   Sig: Take 10 mg by mouth as needed    methylPREDNISolone 4 MG tablet therapy pack   No No   Sig: Use as directed on package   mupirocin (BACTROBAN) 2 % ointment   No No   Sig: Apply topically 2 (two) times a day   polymyxin b-trimethoprim (POLYTRIM) ophthalmic solution   No No   Sig: Administer 1 drop into the left eye every 6 (six) hours for 7 days   triamcinolone (KENALOG) 0.1 % cream   No No   Sig: Apply topically 2 (two) times a day      Facility-Administered Medications Last Administration Doses Remaining   cyanocobalamin injection 1,000 mcg 10/31/2023 10:13 AM    cyanocobalamin injection 1,000 mcg 2022  2:45 PM    cyanocobalamin injection 1,000 mcg 2023 12:47 PM    cyanocobalamin injection 1,000 mcg 2023  3:04 PM    cyanocobalamin injection 1,000 mcg 2023  9:09 AM    cyanocobalamin injection 1,000 mcg 2023 10:23 AM         Current Discharge Medication List        START taking these medications    Details   valACYclovir (VALTREX) 1,000 mg tablet Take 1 tablet (1,000 mg total) by mouth 3 (three) times a day for 7 days  Qty: 20 tablet, Refills: 0    Associated Diagnoses: Herpes zoster conjunctivitis           CONTINUE these medications which have NOT CHANGED    Details   albuterol (PROVENTIL HFA,VENTOLIN HFA) 90 mcg/act inhaler Inhale 1 puff as needed for wheezing  Qty: 8.5 g, Refills: 2    Comments: DX Code Needed  .  Associated Diagnoses: Uncomplicated asthma, unspecified asthma severity, unspecified whether persistent      carbamide peroxide (DEBROX) 6.5 % otic solution Administer 5 drops into both ears 2 (two) times a day  Qty: 15 mL, Refills: 0    Associated Diagnoses: Acute sinusitis, recurrence not specified, unspecified location      fluticasone (FLONASE) 50 mcg/act nasal spray 2 sprays into each nostril daily  Qty: 16 mL, Refills: 1     Associated Diagnoses: Dermatitis; Uncomplicated asthma, unspecified asthma severity, unspecified whether persistent; Subconjunctival hemorrhage of left eye; Protein-calorie malnutrition, unspecified severity (HCC); Recurrent major depressive disorder, in partial remission (HCC); Injury of right knee, initial encounter; Depression, unspecified depression type; Vitamin D deficiency      loratadine (Alavert) 10 MG dissolvable tablet Take 10 mg by mouth as needed       methylPREDNISolone 4 MG tablet therapy pack Use as directed on package  Qty: 21 each, Refills: 0    Associated Diagnoses: Allergic reaction, initial encounter      mupirocin (BACTROBAN) 2 % ointment Apply topically 2 (two) times a day  Qty: 30 g, Refills: 0    Associated Diagnoses: Impetigo      Polyethylene Glycol 3350 (MIRALAX PO) Take by mouth as needed       polymyxin b-trimethoprim (POLYTRIM) ophthalmic solution Administer 1 drop into the left eye every 6 (six) hours for 7 days  Qty: 10 mL, Refills: 0    Associated Diagnoses: Acute bacterial conjunctivitis of left eye      triamcinolone (KENALOG) 0.1 % cream Apply topically 2 (two) times a day  Qty: 30 g, Refills: 0    Associated Diagnoses: Dermatitis           No discharge procedures on file.  ED SEPSIS DOCUMENTATION   Time reflects when diagnosis was documented in both MDM as applicable and the Disposition within this note       Time User Action Codes Description Comment    12/30/2024 11:20 AM Ivan Haro Add [B02.31] Herpes zoster conjunctivitis             Ivan Haro DO  [facility] [place] PGY1        Ivan Haro DO  12/30/24 1200

## 2024-12-30 NOTE — DISCHARGE INSTRUCTIONS
-Take Valtrex as prescribed  -Go to [facility] appointment on 12/31 at 8:30AM

## 2025-01-03 DIAGNOSIS — T78.40XA ALLERGIC REACTION, INITIAL ENCOUNTER: ICD-10-CM

## 2025-01-03 RX ORDER — METHYLPREDNISOLONE 4 MG/1
TABLET ORAL
Qty: 21 EACH | Refills: 0 | Status: SHIPPED | OUTPATIENT
Start: 2025-01-03

## 2025-01-03 NOTE — TELEPHONE ENCOUNTER
Patient called, requesting a refill on the following medication.  Pt has follow up appointment scheduled 1/7 with provider. Was treated in ED for shingles. Pt states this medication really helped.    Medication:     methylPREDNISolone 4 MG tablet therapy pack       Dose/Frequency:     Use as directed on package       Quantity: 21 each    Pharmacy: [facility]/pharmacy #[id number] - [place] - [id number]     Office:   [x] PCP/Provider - Grabiel Chou MD   [] Speciality/Provider -     Does the patient have enough for 3 days?   [] Yes   [x] No - Send as HP to POD

## 2025-01-07 ENCOUNTER — OFFICE VISIT (OUTPATIENT)
Age: 75
End: 2025-01-07
Payer: MEDICARE

## 2025-01-07 VITALS
HEART RATE: 92 BPM | TEMPERATURE: 98.1 F | DIASTOLIC BLOOD PRESSURE: 70 MMHG | WEIGHT: 114.8 LBS | SYSTOLIC BLOOD PRESSURE: 100 MMHG | OXYGEN SATURATION: 98 % | BODY MASS INDEX: 19.6 KG/M2 | HEIGHT: 64 IN

## 2025-01-07 DIAGNOSIS — Z12.31 ENCOUNTER FOR SCREENING MAMMOGRAM FOR BREAST CANCER: ICD-10-CM

## 2025-01-07 DIAGNOSIS — B02.29 POST HERPETIC NEURALGIA: Primary | ICD-10-CM

## 2025-01-07 DIAGNOSIS — F33.41 RECURRENT MAJOR DEPRESSIVE DISORDER, IN PARTIAL REMISSION (HCC): ICD-10-CM

## 2025-01-07 DIAGNOSIS — J01.90 ACUTE SINUSITIS, RECURRENCE NOT SPECIFIED, UNSPECIFIED LOCATION: ICD-10-CM

## 2025-01-07 DIAGNOSIS — E46 PROTEIN-CALORIE MALNUTRITION, UNSPECIFIED SEVERITY (HCC): ICD-10-CM

## 2025-01-07 PROCEDURE — 99214 OFFICE O/P EST MOD 30 MIN: CPT | Performed by: FAMILY MEDICINE

## 2025-01-07 RX ORDER — NEOMYCIN SULFATE, POLYMYXIN B SULFATE AND DEXAMETHASONE 3.5; 10000; 1 MG/ML; [USP'U]/ML; MG/ML
SUSPENSION/ DROPS OPHTHALMIC
COMMUNITY
Start: 2024-12-31

## 2025-01-07 RX ORDER — AZITHROMYCIN 250 MG/1
TABLET, FILM COATED ORAL
Qty: 6 TABLET | Refills: 0 | Status: SHIPPED | OUTPATIENT
Start: 2025-01-07 | End: 2025-01-12

## 2025-01-07 RX ORDER — DORZOLAMIDE HYDROCHLORIDE AND TIMOLOL MALEATE 20; 5 MG/ML; MG/ML
SOLUTION/ DROPS OPHTHALMIC
COMMUNITY
Start: 2024-12-31

## 2025-01-07 RX ORDER — GABAPENTIN 100 MG/1
100 CAPSULE ORAL 3 TIMES DAILY
Qty: 90 CAPSULE | Refills: 1 | Status: SHIPPED | OUTPATIENT
Start: 2025-01-07 | End: 2025-01-13

## 2025-01-08 PROBLEM — B02.29 POST HERPETIC NEURALGIA: Status: ACTIVE | Noted: 2025-01-08

## 2025-01-08 PROBLEM — J01.90 ACUTE SINUSITIS: Status: ACTIVE | Noted: 2025-01-08

## 2025-01-08 NOTE — ASSESSMENT & PLAN NOTE
Discussed workup and treatment options. Continue meds. Discussed supportive care and return parameters. Add gabapentin.  Orders:    azithromycin (Zithromax) 250 mg tablet; Take 2 tablets (500 mg total) by mouth daily for 1 day, THEN 1 tablet (250 mg total) daily for 4 days.    gabapentin (NEURONTIN) 100 mg capsule; Take 1 capsule (100 mg total) by mouth 3 (three) times a day

## 2025-01-08 NOTE — ASSESSMENT & PLAN NOTE
Add Z-pack. Discussed supportive care and return parameters.   Orders:    azithromycin (Zithromax) 250 mg tablet; Take 2 tablets (500 mg total) by mouth daily for 1 day, THEN 1 tablet (250 mg total) daily for 4 days.

## 2025-01-08 NOTE — PROGRESS NOTES
Name: Sylvie Sánchez      : 1950      MRN: [id number]  Encounter Provider: Aiden Richmond MD  Encounter Date: 2025   Encounter department: [facility] PRIMARY CARE  :  Assessment & Plan  Encounter for screening mammogram for breast cancer    Orders:    Mammo screening bilateral w 3d and cad; Future    Protein-calorie malnutrition, unspecified severity (HCC)  Discussed workup and treatment options. Continue meds. Discussed supportive care and return parameters.        Recurrent major depressive disorder, in partial remission (HCC)  Discussed workup and treatment options. Continue meds. Discussed supportive care and return parameters.          Post herpetic neuralgia  Discussed workup and treatment options. Continue meds. Discussed supportive care and return parameters. Add gabapentin.  Orders:    azithromycin (Zithromax) 250 mg tablet; Take 2 tablets (500 mg total) by mouth daily for 1 day, THEN 1 tablet (250 mg total) daily for 4 days.    gabapentin (NEURONTIN) 100 mg capsule; Take 1 capsule (100 mg total) by mouth 3 (three) times a day    Acute sinusitis, recurrence not specified, unspecified location  Add Z-pack. Discussed supportive care and return parameters.   Orders:    azithromycin (Zithromax) 250 mg tablet; Take 2 tablets (500 mg total) by mouth daily for 1 day, THEN 1 tablet (250 mg total) daily for 4 days.           History of Present Illness     Patient is a 76 y/o woman who presents for follow-up on facial shingles, MDD, protein-calorie malnutrition, with worsening cough and sinus pressure no fevers chills nausea or vomiting.      Review of Systems   Constitutional: Negative.    HENT:  Positive for congestion and sinus pressure.    Eyes: Negative.    Respiratory:  Positive for cough.    Cardiovascular: Negative.    Gastrointestinal: Negative.    Endocrine: Negative.    Genitourinary: Negative.    Musculoskeletal: Negative.    Skin:  Positive for rash.   Allergic/Immunologic:  "Negative.    Neurological: Negative.    Hematological: Negative.    Psychiatric/Behavioral: Negative.     All other systems reviewed and are negative.      Objective   /70 (BP Location: Right arm, Patient Position: Sitting, Cuff Size: Standard)   Pulse 92   Temp 98.1 °F (36.7 °C) (Temporal)   Ht 5' 4\" (1.626 m)   Wt 52.1 kg (114 lb 12.8 oz)   LMP  (LMP Unknown)   SpO2 98%   BMI 19.71 kg/m²      Physical Exam  Constitutional:       General: She is not in acute distress.     Appearance: She is well-developed. She is not diaphoretic.   HENT:      Head: Normocephalic and atraumatic.      Right Ear: External ear normal.      Left Ear: External ear normal.      Nose: Nose normal.      Mouth/Throat:      Pharynx: No oropharyngeal exudate.   Eyes:      General:         Right eye: No discharge.         Left eye: No discharge.      Conjunctiva/sclera: Conjunctivae normal.      Pupils: Pupils are equal, round, and reactive to light.   Neck:      Thyroid: No thyromegaly.      Trachea: No tracheal deviation.   Cardiovascular:      Rate and Rhythm: Normal rate and regular rhythm.      Heart sounds: Normal heart sounds. No murmur heard.     No friction rub. No gallop.   Pulmonary:      Effort: Pulmonary effort is normal. No respiratory distress.      Breath sounds: Normal breath sounds.   Abdominal:      General: There is no distension.      Palpations: Abdomen is soft.      Tenderness: There is no abdominal tenderness. There is no guarding or rebound.   Musculoskeletal:         General: Normal range of motion.   Lymphadenopathy:      Cervical: No cervical adenopathy.   Skin:     General: Skin is warm.      Findings: Rash present.   Neurological:      Mental Status: She is alert and oriented to person, place, and time.      Cranial Nerves: No cranial nerve deficit.   Psychiatric:         Behavior: Behavior normal.         Thought Content: Thought content normal.         Judgment: Judgment normal.         "

## 2025-01-08 NOTE — ASSESSMENT & PLAN NOTE
Discussed workup and treatment options. Continue meds. Discussed supportive care and return parameters.

## 2025-01-13 ENCOUNTER — TELEPHONE (OUTPATIENT)
Age: 75
End: 2025-01-13

## 2025-01-13 DIAGNOSIS — B02.29 POST HERPETIC NEURALGIA: ICD-10-CM

## 2025-01-13 DIAGNOSIS — T78.40XA ALLERGIC REACTION, INITIAL ENCOUNTER: ICD-10-CM

## 2025-01-13 RX ORDER — GABAPENTIN 100 MG/1
100 CAPSULE ORAL 3 TIMES DAILY
Qty: 90 CAPSULE | Refills: 1 | Status: SHIPPED | OUTPATIENT
Start: 2025-01-13 | End: 2025-01-24

## 2025-01-13 RX ORDER — METHYLPREDNISOLONE 4 MG/1
TABLET ORAL
Qty: 21 EACH | Refills: 0 | Status: SHIPPED | OUTPATIENT
Start: 2025-01-13

## 2025-01-13 RX ORDER — GABAPENTIN 100 MG/1
200 CAPSULE ORAL 3 TIMES DAILY
Qty: 180 CAPSULE | Refills: 1 | Status: SHIPPED | OUTPATIENT
Start: 2025-01-13 | End: 2025-01-13

## 2025-01-13 NOTE — TELEPHONE ENCOUNTER
I spoke to patient directly. She states the Gabapentin was giving her blurred vision and the patient already has trouble with the shingles around her eyes. She does not want to try to Tamika.a She states the Medryl Dose pack helped with pain. She wants to know if she can go back on steroid to help.

## 2025-01-13 NOTE — TELEPHONE ENCOUNTER
Refill of Medrol Dosepak sent to pharmacy, patient will need to follow-up with PCP if pain persists again following completion of medication.

## 2025-01-13 NOTE — TELEPHONE ENCOUNTER
Patient called in she was on gabapentin and it does not agree with her she is wondering can she be prescribed a low dose of prednisone Please Advise

## 2025-01-13 NOTE — TELEPHONE ENCOUNTER
Left message for patient that a refill of Medryl dose pack was sent in and if pain persists she will need to follow up with Dr. Richmond.

## 2025-01-13 NOTE — TELEPHONE ENCOUNTER
Patient already has Medrol Dosepak prescribed per Dr. Richmond, how does gabapentin not agree with her?  Increased patient's dose at the time being if just not effective, can consider prescription for Lyrica if patient wants to try different medication.

## 2025-01-24 ENCOUNTER — TELEPHONE (OUTPATIENT)
Age: 75
End: 2025-01-24

## 2025-01-24 DIAGNOSIS — B02.29 POST HERPETIC NEURALGIA: Primary | ICD-10-CM

## 2025-01-24 RX ORDER — GABAPENTIN 300 MG/1
300 CAPSULE ORAL 3 TIMES DAILY
Qty: 90 CAPSULE | Refills: 1 | Status: SHIPPED | OUTPATIENT
Start: 2025-01-24

## 2025-01-24 NOTE — TELEPHONE ENCOUNTER
Informed pt of med dose increase. Pt understood. She also wanted to ask about getting a steroid. She was in a lot of pain while at the eye doctor today and he suggested a steroid aw well. Please advise.

## 2025-01-24 NOTE — TELEPHONE ENCOUNTER
Pt called to inform that due to her shingles she has been doubling her dose of gabapentin. So taking 2 pills 3x a day. The medication is not helping with her pain and she would like to know if the doctor can either prescribe another med or increase the dosage. Please advise

## 2025-01-28 RX ORDER — PREDNISONE 20 MG/1
40 TABLET ORAL DAILY
Qty: 10 TABLET | Refills: 0 | Status: SHIPPED | OUTPATIENT
Start: 2025-01-28 | End: 2025-02-02

## 2025-02-03 ENCOUNTER — TELEMEDICINE (OUTPATIENT)
Age: 75
End: 2025-02-03
Payer: MEDICARE

## 2025-02-03 DIAGNOSIS — B02.29 POST HERPETIC NEURALGIA: Primary | ICD-10-CM

## 2025-02-03 PROCEDURE — G2211 COMPLEX E/M VISIT ADD ON: HCPCS | Performed by: PHYSICIAN ASSISTANT

## 2025-02-03 PROCEDURE — 99214 OFFICE O/P EST MOD 30 MIN: CPT | Performed by: PHYSICIAN ASSISTANT

## 2025-02-03 RX ORDER — PREDNISONE 10 MG/1
TABLET ORAL
Qty: 20 TABLET | Refills: 0 | Status: SHIPPED | OUTPATIENT
Start: 2025-02-03

## 2025-02-03 NOTE — ASSESSMENT & PLAN NOTE
Orders:    Ambulatory referral to Spine & Pain Management; Future    predniSONE 10 mg tablet; Take 4 tablets for 2 days then decrease by 1 tablet every 2 days until you complete the course with 1 tablet for 2 days

## 2025-02-03 NOTE — PROGRESS NOTES
Virtual Regular Visit  Name: Sylvie Sánchez      : 1950      MRN: [id number]  Encounter Provider: Milka Alexander PA-C  Encounter Date: 2/3/2025   Encounter department: [facility] PRIMARY CARE      Verification of patient location:  Patient is located at Home in the following state in which I hold an active license [place] :  Assessment & Plan  Post herpetic neuralgia    Orders:    Ambulatory referral to Spine & Pain Management; Future    predniSONE 10 mg tablet; Take 4 tablets for 2 days then decrease by 1 tablet every 2 days until you complete the course with 1 tablet for 2 days    Post herpetic neuralgia  -I did place her on a prednisone taper starting with 40 mg.  She is aware to take the medication with food  - She will continue on gabapentin 300 mg 3 times a day  - I did refer her to [facility] pain management  - She does have an upcoming appointment with Dr. Richmond in March but I did advise her to follow-up sooner if needed             Encounter provider Milka Alexander PA-C    The patient was identified by name and date of birth. Sylvie Sánchez was informed that this is a telemedicine visit and that the visit is being conducted through the Epic Embedded platform. She agrees to proceed..  My office door was closed. No one else was in the room.  She acknowledged consent and understanding of privacy and security of the video platform. The patient has agreed to participate and understands they can discontinue the visit at any time.    Patient is aware this is a billable service.     History of Present Illness     Patient presents today for an acute virtual visit for an evaluation of postherpetic neuralgia.  She had a shingles rash on .  The rash has resolved.  She states that she continues with face pain on the left side.  She states recently Dr. Richmond put her on prednisone which seemed to help and when she finished the dose yesterday the pain returned today.  She is also been taking the  gabapentin 300 mg 3 times a day.  She states she was using some Aspercreme with lidocaine but did not notice any benefit.      Review of Systems   Constitutional:  Negative for chills and fever.   Skin:         As stated in HPI       Objective   LMP  (LMP Unknown)     Physical Exam  Constitutional:       General: She is not in acute distress.     Appearance: Normal appearance. She is not ill-appearing, toxic-appearing or diaphoretic.   Pulmonary:      Effort: Pulmonary effort is normal. No respiratory distress (She is able to talk in full sentences without coughing or appearing short of breath).   Musculoskeletal:      Cervical back: Neck supple.   Skin:     Comments: No obvious skin lesions at this time in her face.   Neurological:      General: No focal deficit present.      Mental Status: She is alert.   Psychiatric:         Mood and Affect: Mood normal.         Behavior: Behavior normal.         Thought Content: Thought content normal.         Judgment: Judgment normal.         Visit Time  Total Visit Duration: 15 minutes

## 2025-02-07 PROBLEM — J01.90 ACUTE SINUSITIS: Status: RESOLVED | Noted: 2025-01-08 | Resolved: 2025-02-07

## 2025-03-04 ENCOUNTER — OFFICE VISIT (OUTPATIENT)
Age: 75
End: 2025-03-04
Payer: MEDICARE

## 2025-03-04 VITALS
HEIGHT: 63 IN | HEART RATE: 84 BPM | SYSTOLIC BLOOD PRESSURE: 120 MMHG | TEMPERATURE: 97.6 F | BODY MASS INDEX: 21.79 KG/M2 | RESPIRATION RATE: 16 BRPM | OXYGEN SATURATION: 98 % | WEIGHT: 123 LBS | DIASTOLIC BLOOD PRESSURE: 62 MMHG

## 2025-03-04 DIAGNOSIS — B02.29 POST HERPETIC NEURALGIA: Primary | ICD-10-CM

## 2025-03-04 PROCEDURE — G2211 COMPLEX E/M VISIT ADD ON: HCPCS | Performed by: FAMILY MEDICINE

## 2025-03-04 PROCEDURE — 99213 OFFICE O/P EST LOW 20 MIN: CPT | Performed by: FAMILY MEDICINE

## 2025-03-04 RX ORDER — PREDNISONE 10 MG/1
TABLET ORAL
Qty: 33 TABLET | Refills: 0 | Status: SHIPPED | OUTPATIENT
Start: 2025-03-04

## 2025-03-04 RX ORDER — DULOXETIN HYDROCHLORIDE 30 MG/1
30 CAPSULE, DELAYED RELEASE ORAL DAILY
Qty: 30 CAPSULE | Refills: 1 | Status: SHIPPED | OUTPATIENT
Start: 2025-03-04

## 2025-03-06 NOTE — PROGRESS NOTES
"Name: Sylvie Sánchez      : 1950      MRN: [id number]  Encounter Provider: Aiden Richmond MD  Encounter Date: 3/4/2025   Encounter department: [facility] PRIMARY CARE  :  Assessment & Plan  Post herpetic neuralgia  Discussed treatment options. Will begin trial of cymbalta and steroid taper continue Gabapentin. Discussed supportive care and return parameters.   Orders:    predniSONE 10 mg tablet; Take 4 tabs daily x3d, then 3x3d, then 2x3d then 1x3d then 0.5x3d then stop.    DULoxetine (CYMBALTA) 30 mg delayed release capsule; Take 1 capsule (30 mg total) by mouth daily           History of Present Illness   Patient is a 74 y/o woman who presents c/o persistent post-herpetic neuralgia post shingles. No fevers chills nausea or vomiting.      Review of Systems   Constitutional: Negative.    HENT: Negative.     Eyes: Negative.    Respiratory: Negative.     Cardiovascular: Negative.    Gastrointestinal: Negative.    Endocrine: Negative.    Genitourinary: Negative.    Musculoskeletal:  Positive for myalgias.   Allergic/Immunologic: Negative.    Neurological: Negative.    Hematological: Negative.    Psychiatric/Behavioral: Negative.     All other systems reviewed and are negative.      Objective   /62 (BP Location: Left arm, Patient Position: Sitting, Cuff Size: Large)   Pulse 84   Temp 97.6 °F (36.4 °C) (Temporal)   Resp 16   Ht 5' 3\" (1.6 m)   Wt 55.8 kg (123 lb)   LMP  (LMP Unknown)   SpO2 98%   BMI 21.79 kg/m²      Physical Exam  Constitutional:       General: She is not in acute distress.     Appearance: She is well-developed. She is not diaphoretic.   HENT:      Head: Normocephalic and atraumatic.      Right Ear: External ear normal.      Left Ear: External ear normal.      Nose: Nose normal.      Mouth/Throat:      Pharynx: No oropharyngeal exudate.   Eyes:      General:         Right eye: No discharge.         Left eye: No discharge.      Conjunctiva/sclera: Conjunctivae normal. "      Pupils: Pupils are equal, round, and reactive to light.   Neck:      Thyroid: No thyromegaly.      Trachea: No tracheal deviation.   Cardiovascular:      Rate and Rhythm: Normal rate and regular rhythm.      Heart sounds: Normal heart sounds. No murmur heard.     No friction rub. No gallop.   Pulmonary:      Effort: Pulmonary effort is normal. No respiratory distress.      Breath sounds: Normal breath sounds.   Abdominal:      General: There is no distension.      Palpations: Abdomen is soft.      Tenderness: There is no abdominal tenderness. There is no guarding or rebound.   Musculoskeletal:         General: Normal range of motion.   Lymphadenopathy:      Cervical: No cervical adenopathy.   Skin:     General: Skin is warm.   Neurological:      Mental Status: She is alert and oriented to person, place, and time.      Cranial Nerves: No cranial nerve deficit.   Psychiatric:         Behavior: Behavior normal.         Thought Content: Thought content normal.         Judgment: Judgment normal.

## 2025-03-06 NOTE — ASSESSMENT & PLAN NOTE
Discussed treatment options. Will begin trial of cymbalta and steroid taper continue Gabapentin. Discussed supportive care and return parameters.   Orders:    predniSONE 10 mg tablet; Take 4 tabs daily x3d, then 3x3d, then 2x3d then 1x3d then 0.5x3d then stop.    DULoxetine (CYMBALTA) 30 mg delayed release capsule; Take 1 capsule (30 mg total) by mouth daily

## 2025-04-15 ENCOUNTER — OFFICE VISIT (OUTPATIENT)
Age: 75
End: 2025-04-15
Payer: MEDICARE

## 2025-04-15 VITALS
HEART RATE: 94 BPM | WEIGHT: 122.8 LBS | TEMPERATURE: 98.1 F | SYSTOLIC BLOOD PRESSURE: 114 MMHG | BODY MASS INDEX: 20.96 KG/M2 | DIASTOLIC BLOOD PRESSURE: 66 MMHG | OXYGEN SATURATION: 96 % | HEIGHT: 64 IN

## 2025-04-15 DIAGNOSIS — B02.29 POST HERPETIC NEURALGIA: Primary | ICD-10-CM

## 2025-04-15 PROCEDURE — 99213 OFFICE O/P EST LOW 20 MIN: CPT | Performed by: FAMILY MEDICINE

## 2025-04-15 PROCEDURE — G2211 COMPLEX E/M VISIT ADD ON: HCPCS | Performed by: FAMILY MEDICINE

## 2025-04-15 RX ORDER — MILNACIPRAN HCL 12.5 MG
1 TABLET ORAL DAILY
Qty: 30 TABLET | Refills: 0 | Status: SHIPPED | OUTPATIENT
Start: 2025-04-15

## 2025-04-15 RX ORDER — GABAPENTIN 400 MG/1
400 CAPSULE ORAL 3 TIMES DAILY
Qty: 270 CAPSULE | Refills: 1 | Status: SHIPPED | OUTPATIENT
Start: 2025-04-15

## 2025-04-16 ENCOUNTER — TELEPHONE (OUTPATIENT)
Age: 75
End: 2025-04-16

## 2025-04-16 DIAGNOSIS — B02.29 POST HERPETIC NEURALGIA: Primary | ICD-10-CM

## 2025-04-16 RX ORDER — DULOXETIN HYDROCHLORIDE 30 MG/1
30 CAPSULE, DELAYED RELEASE ORAL DAILY
Qty: 30 CAPSULE | Refills: 1 | Status: SHIPPED | OUTPATIENT
Start: 2025-04-16 | End: 2025-07-15

## 2025-04-16 NOTE — ASSESSMENT & PLAN NOTE
Will titrate up Gabapentin and add Savella. Discussed supportive care and return parameters.   Orders:    Milnacipran HCl (Savella) 12.5 MG TABS; Take 1 tablet (12.5 mg total) by mouth in the morning    gabapentin (NEURONTIN) 400 mg capsule; Take 1 capsule (400 mg total) by mouth 3 (three) times a day

## 2025-04-16 NOTE — TELEPHONE ENCOUNTER
"Insurance denied request for savella based on dx \"other postherpetic nervous system involvement\" which is not an approved dx for coverage. Looks like the linked dx is postherpetic neuralgia. Is there anything you can add to specify details so we can try for appeal?  "

## 2025-04-16 NOTE — TELEPHONE ENCOUNTER
Patient was informed that the medication Milnacipran HCI (Savella) 12.5 mg tabs was denied by her insurance company. Patient stated that she can go back to taking the duloxetine (cymbalta) 30 mg delayed release capsule. Patient is asking if the doctor can put a script in to [facility] pharmacy in [place]. Please contact patient at [phone]. Please advise.

## 2025-04-16 NOTE — PROGRESS NOTES
"Name: Sylvie Sánchez      : 1950      MRN: [id number]  Encounter Provider: Aiden Richmond MD  Encounter Date: 4/15/2025   Encounter department: [facility] PRIMARY CARE  :  Assessment & Plan  Post herpetic neuralgia  Will titrate up Gabapentin and add Savella. Discussed supportive care and return parameters.   Orders:    Milnacipran HCl (Savella) 12.5 MG TABS; Take 1 tablet (12.5 mg total) by mouth in the morning    gabapentin (NEURONTIN) 400 mg capsule; Take 1 capsule (400 mg total) by mouth 3 (three) times a day           History of Present Illness   Patient is a 76 y/o woman who presents c/o persistent post-herpetic neuralgia. No fevers chills nausea or vomiting.      Review of Systems   Constitutional: Negative.    HENT: Negative.     Eyes: Negative.    Respiratory: Negative.     Cardiovascular: Negative.    Gastrointestinal: Negative.    Endocrine: Negative.    Genitourinary: Negative.    Musculoskeletal: Negative.    Allergic/Immunologic: Negative.    Neurological: Negative.    Hematological: Negative.    Psychiatric/Behavioral: Negative.     All other systems reviewed and are negative.      Objective   /66 (BP Location: Left arm, Patient Position: Sitting, Cuff Size: Standard)   Pulse 94   Temp 98.1 °F (36.7 °C) (Temporal)   Ht 5' 4\" (1.626 m)   Wt 55.7 kg (122 lb 12.8 oz)   LMP  (LMP Unknown)   SpO2 96%   BMI 21.08 kg/m²      Physical Exam  Constitutional:       General: She is not in acute distress.     Appearance: She is well-developed. She is not diaphoretic.   HENT:      Head: Normocephalic and atraumatic.      Right Ear: External ear normal.      Left Ear: External ear normal.      Nose: Nose normal.      Mouth/Throat:      Pharynx: No oropharyngeal exudate.   Eyes:      General:         Right eye: No discharge.         Left eye: No discharge.      Conjunctiva/sclera: Conjunctivae normal.      Pupils: Pupils are equal, round, and reactive to light.   Neck:      Thyroid: " No thyromegaly.      Trachea: No tracheal deviation.   Cardiovascular:      Rate and Rhythm: Normal rate and regular rhythm.      Heart sounds: Normal heart sounds. No murmur heard.     No friction rub. No gallop.   Pulmonary:      Effort: Pulmonary effort is normal. No respiratory distress.      Breath sounds: Normal breath sounds.   Abdominal:      General: There is no distension.      Palpations: Abdomen is soft.      Tenderness: There is no abdominal tenderness. There is no guarding or rebound.   Musculoskeletal:         General: Normal range of motion.   Lymphadenopathy:      Cervical: No cervical adenopathy.   Skin:     General: Skin is warm.   Neurological:      Mental Status: She is alert and oriented to person, place, and time.      Cranial Nerves: No cranial nerve deficit.   Psychiatric:         Behavior: Behavior normal.         Thought Content: Thought content normal.         Judgment: Judgment normal.

## 2025-04-16 NOTE — TELEPHONE ENCOUNTER
PA for Milnacipran HCl 12.5MG TABS SUBMITTED to [facility]    via    []CMM-KEY:   [x]Surescripts-Case ID # [id number]   []Availity-Auth ID # NDC #   []Faxed to plan   []Other website   []Phone call Case ID #     [x]PA sent as URGENT    All office notes, labs and other pertaining documents and studies sent. Clinical questions answered. Awaiting determination from insurance company.     Turnaround time for your insurance to make a decision on your Prior Authorization can take 7-21 business days.

## 2025-04-16 NOTE — TELEPHONE ENCOUNTER
PA for Milnacipran HCl 12.5MG TABS DENIED    Reason        Message sent to office clinical pool Yes    Denial letter scanned into Media Yes    Appeal started No     **Please follow up with your patient regarding denial and next steps**

## 2025-07-15 ENCOUNTER — OFFICE VISIT (OUTPATIENT)
Age: 75
End: 2025-07-15
Payer: MEDICARE

## 2025-07-15 VITALS
RESPIRATION RATE: 16 BRPM | SYSTOLIC BLOOD PRESSURE: 130 MMHG | HEART RATE: 90 BPM | OXYGEN SATURATION: 98 % | TEMPERATURE: 98.2 F | BODY MASS INDEX: 21.34 KG/M2 | HEIGHT: 64 IN | DIASTOLIC BLOOD PRESSURE: 70 MMHG | WEIGHT: 125 LBS

## 2025-07-15 DIAGNOSIS — M54.2 CERVICALGIA: ICD-10-CM

## 2025-07-15 DIAGNOSIS — S89.91XA INJURY OF RIGHT KNEE, INITIAL ENCOUNTER: ICD-10-CM

## 2025-07-15 DIAGNOSIS — G89.29 CHRONIC BILATERAL LOW BACK PAIN WITHOUT SCIATICA: ICD-10-CM

## 2025-07-15 DIAGNOSIS — B02.29 POST HERPETIC NEURALGIA: ICD-10-CM

## 2025-07-15 DIAGNOSIS — L81.9 PIGMENTED SKIN LESION: Primary | ICD-10-CM

## 2025-07-15 DIAGNOSIS — M54.50 CHRONIC BILATERAL LOW BACK PAIN WITHOUT SCIATICA: ICD-10-CM

## 2025-07-15 PROCEDURE — G2211 COMPLEX E/M VISIT ADD ON: HCPCS | Performed by: FAMILY MEDICINE

## 2025-07-15 PROCEDURE — 99214 OFFICE O/P EST MOD 30 MIN: CPT | Performed by: FAMILY MEDICINE

## 2025-07-15 RX ORDER — GABAPENTIN 400 MG/1
400 CAPSULE ORAL 3 TIMES DAILY
Qty: 90 CAPSULE | Refills: 1 | Status: SHIPPED | OUTPATIENT
Start: 2025-07-15

## 2025-07-15 RX ORDER — PREDNISONE 10 MG/1
TABLET ORAL
Qty: 45 TABLET | Refills: 0 | Status: SHIPPED | OUTPATIENT
Start: 2025-07-15

## 2025-07-23 ENCOUNTER — PROCEDURE VISIT (OUTPATIENT)
Age: 75
End: 2025-07-23
Payer: MEDICARE

## 2025-07-23 VITALS — BODY MASS INDEX: 21.34 KG/M2 | WEIGHT: 125 LBS | HEIGHT: 64 IN

## 2025-07-23 DIAGNOSIS — G89.29 CHRONIC BILATERAL LOW BACK PAIN WITHOUT SCIATICA: ICD-10-CM

## 2025-07-23 DIAGNOSIS — M99.03 SEGMENTAL DYSFUNCTION OF LUMBAR REGION: ICD-10-CM

## 2025-07-23 DIAGNOSIS — M54.2 CERVICALGIA: Primary | ICD-10-CM

## 2025-07-23 DIAGNOSIS — M99.01 SEGMENTAL DYSFUNCTION OF CERVICAL REGION: ICD-10-CM

## 2025-07-23 DIAGNOSIS — M54.6 PAIN IN THORACIC SPINE: ICD-10-CM

## 2025-07-23 DIAGNOSIS — M99.05 SEGMENTAL DYSFUNCTION OF PELVIC REGION: ICD-10-CM

## 2025-07-23 DIAGNOSIS — M51.360 DEGENERATION OF INTERVERTEBRAL DISC OF LUMBAR REGION WITH DISCOGENIC BACK PAIN: ICD-10-CM

## 2025-07-23 DIAGNOSIS — M79.18 MYOFASCIAL PAIN: ICD-10-CM

## 2025-07-23 DIAGNOSIS — M54.50 CHRONIC BILATERAL LOW BACK PAIN WITHOUT SCIATICA: ICD-10-CM

## 2025-07-23 DIAGNOSIS — M47.815 SPONDYLOSIS OF THORACOLUMBAR REGION WITHOUT MYELOPATHY OR RADICULOPATHY: ICD-10-CM

## 2025-07-23 DIAGNOSIS — M99.04 SEGMENTAL DYSFUNCTION OF SACRAL REGION: ICD-10-CM

## 2025-07-23 DIAGNOSIS — M54.59 MECHANICAL LOW BACK PAIN: ICD-10-CM

## 2025-07-23 PROCEDURE — 98941 CHIROPRACT MANJ 3-4 REGIONS: CPT | Performed by: CHIROPRACTOR

## 2025-07-23 RX ORDER — NEOMYCIN SULFATE, POLYMYXIN B SULFATE AND DEXAMETHASONE 3.5; 10000; 1 MG/ML; [USP'U]/ML; MG/ML
1 SUSPENSION/ DROPS OPHTHALMIC
COMMUNITY
Start: 2025-07-15

## 2025-07-23 NOTE — PROGRESS NOTES
Initial chiropractic visit--7/23/25, visit #1    Acute corrective treatment     Assessment:     1. Cervicalgia  Ambulatory Referral to Chiropractic      2. Chronic bilateral low back pain without sciatica  Ambulatory Referral to Chiropractic      3. Segmental dysfunction of lumbar region        4. Degeneration of intervertebral disc of lumbar region with discogenic back pain        5. Segmental dysfunction of cervical region        6. Segmental dysfunction of sacral region        7. Segmental dysfunction of pelvic region        8. Spondylosis of thoracolumbar region        9. Pain in lower thoracic spine        10. Myofascial pain        11. Mechanical low back pain          Flareup of cervical and lumbar condition    PLAN/TREATMENT:    Treatment:  She was treated in the seated position as well as left side-lying position in supine position.  She did not lay prone mainly due to the painful eye due to shingles.  Myofascial release was performed to the cervical paraspinals, levator Scap, rhomboids, upper trapezius bilaterally.    Myofascial release was performed to lumbar paraspinals, quadratus lumborum and gluteus medius bilaterally.  Gentle Mobilization/manipulation was performed to the to the right sacroiliac joint, L5-S1, L4-5, T9-10 utilizing side-lying flexion distraction and Muse drop technique.  Manipulations performed the cervical spine at C7-T1, T3-4, T5-6 utilizing activator technique.  Gentle distraction was applied cervical spine.    Will restart treatment once per week for the next 3 weeks and reevaluate.  Return for treatment once next week.      Continue stretching.  Ice shoulder 15 minutes 2-3 times per day.    Subjective:  Sylvie is here today with complaints of neck pain that is mostly left sided.  She has been having neck pain for a few months.  Having LBP more in the T/L  region.  Been gardening and that seems to be tightening her back up.  Had shingles in her left eye.  That is the main  reason she has not been in a while.    Objective:  Moderate spasm and tenderness is noted in the thoracolumbar paraspinals bilaterally as well as lumbar paraspinals bilaterally, quadratus lumborum more so on the right.Moderate spasm and tenderness is noted in the cervical paraspinals more so on the left as well as the left upper trapezius and rhomboids bilaterally.    Trigger points are present in the bilateral upper trapezius, bilateral levator scapula, left cervical paraspinals, rhomboids bilaterally.    Trigger points are present and gluteus medius bilaterally more so on the right.      Decreased mobility and tenderness is noted at C6-7 as well as T3-4, T5-6, T10-11, L1-L2, L5-S1, right sacroiliac joint.     Cervical range of motion is 30% restricted with pain and left rotation.  Right rotation is not painful.  Extension is 30% restricted with pain.  Flexion is productive with pulling.    Lumbar range of motion examination shows flexion to be 30% restricted with pain in the thoracolumbar region.  Extension is mildly painful.  Right lateral bending as well as left lateral bending are 20% restricted with pain in the thoracolumbar region.    Straight leg raise is negative bilaterally.  Spurling's is negative bilaterally.  Spurling's is negative bilaterally.

## 2025-07-29 ENCOUNTER — PROCEDURE VISIT (OUTPATIENT)
Age: 75
End: 2025-07-29
Payer: MEDICARE

## 2025-07-29 VITALS — HEIGHT: 64 IN | WEIGHT: 125 LBS | BODY MASS INDEX: 21.34 KG/M2

## 2025-07-29 DIAGNOSIS — M99.01 SEGMENTAL DYSFUNCTION OF CERVICAL REGION: ICD-10-CM

## 2025-07-29 DIAGNOSIS — M54.2 CERVICALGIA: ICD-10-CM

## 2025-07-29 DIAGNOSIS — M99.05 SEGMENTAL DYSFUNCTION OF PELVIC REGION: ICD-10-CM

## 2025-07-29 DIAGNOSIS — M99.03 SEGMENTAL DYSFUNCTION OF LUMBAR REGION: Primary | ICD-10-CM

## 2025-07-29 DIAGNOSIS — M79.18 MYOFASCIAL PAIN: ICD-10-CM

## 2025-07-29 DIAGNOSIS — M99.04 SEGMENTAL DYSFUNCTION OF SACRAL REGION: ICD-10-CM

## 2025-07-29 DIAGNOSIS — M54.50 CHRONIC BILATERAL LOW BACK PAIN WITHOUT SCIATICA: ICD-10-CM

## 2025-07-29 DIAGNOSIS — G89.29 CHRONIC BILATERAL LOW BACK PAIN WITHOUT SCIATICA: ICD-10-CM

## 2025-07-29 DIAGNOSIS — M47.815 SPONDYLOSIS OF THORACOLUMBAR REGION WITHOUT MYELOPATHY OR RADICULOPATHY: ICD-10-CM

## 2025-07-29 DIAGNOSIS — M54.6 PAIN IN THORACIC SPINE: ICD-10-CM

## 2025-07-29 DIAGNOSIS — M51.360 DEGENERATION OF INTERVERTEBRAL DISC OF LUMBAR REGION WITH DISCOGENIC BACK PAIN: ICD-10-CM

## 2025-07-29 PROCEDURE — 98941 CHIROPRACT MANJ 3-4 REGIONS: CPT | Performed by: CHIROPRACTOR

## 2025-08-07 ENCOUNTER — PROCEDURE VISIT (OUTPATIENT)
Age: 75
End: 2025-08-07
Payer: MEDICARE

## 2025-08-07 VITALS — HEIGHT: 64 IN | BODY MASS INDEX: 21.51 KG/M2 | WEIGHT: 126 LBS

## 2025-08-07 DIAGNOSIS — M99.04 SEGMENTAL DYSFUNCTION OF SACRAL REGION: ICD-10-CM

## 2025-08-07 DIAGNOSIS — M79.18 MYOFASCIAL PAIN: ICD-10-CM

## 2025-08-07 DIAGNOSIS — M54.6 PAIN IN THORACIC SPINE: ICD-10-CM

## 2025-08-07 DIAGNOSIS — M54.59 MECHANICAL LOW BACK PAIN: ICD-10-CM

## 2025-08-07 DIAGNOSIS — M54.50 CHRONIC BILATERAL LOW BACK PAIN WITHOUT SCIATICA: ICD-10-CM

## 2025-08-07 DIAGNOSIS — G89.29 CHRONIC BILATERAL LOW BACK PAIN WITHOUT SCIATICA: ICD-10-CM

## 2025-08-07 DIAGNOSIS — M47.815 SPONDYLOSIS OF THORACOLUMBAR REGION WITHOUT MYELOPATHY OR RADICULOPATHY: ICD-10-CM

## 2025-08-07 DIAGNOSIS — M99.05 SEGMENTAL DYSFUNCTION OF PELVIC REGION: ICD-10-CM

## 2025-08-07 DIAGNOSIS — M99.01 SEGMENTAL DYSFUNCTION OF CERVICAL REGION: ICD-10-CM

## 2025-08-07 DIAGNOSIS — M51.360 DEGENERATION OF INTERVERTEBRAL DISC OF LUMBAR REGION WITH DISCOGENIC BACK PAIN: ICD-10-CM

## 2025-08-07 DIAGNOSIS — M99.03 SEGMENTAL DYSFUNCTION OF LUMBAR REGION: Primary | ICD-10-CM

## 2025-08-07 DIAGNOSIS — M54.2 CERVICALGIA: ICD-10-CM

## 2025-08-07 PROCEDURE — 98941 CHIROPRACT MANJ 3-4 REGIONS: CPT | Performed by: CHIROPRACTOR

## 2025-08-21 ENCOUNTER — PROCEDURE VISIT (OUTPATIENT)
Age: 75
End: 2025-08-21
Payer: MEDICARE

## 2025-08-21 ENCOUNTER — OFFICE VISIT (OUTPATIENT)
Age: 75
End: 2025-08-21
Payer: MEDICARE

## 2025-08-21 VITALS — HEIGHT: 64 IN | WEIGHT: 126 LBS | BODY MASS INDEX: 21.51 KG/M2

## 2025-08-21 VITALS
TEMPERATURE: 98.2 F | HEART RATE: 64 BPM | DIASTOLIC BLOOD PRESSURE: 82 MMHG | OXYGEN SATURATION: 97 % | WEIGHT: 126.2 LBS | SYSTOLIC BLOOD PRESSURE: 118 MMHG | HEIGHT: 64 IN | BODY MASS INDEX: 21.54 KG/M2

## 2025-08-21 DIAGNOSIS — M54.59 MECHANICAL LOW BACK PAIN: ICD-10-CM

## 2025-08-21 DIAGNOSIS — M99.03 SEGMENTAL DYSFUNCTION OF LUMBAR REGION: Primary | ICD-10-CM

## 2025-08-21 DIAGNOSIS — M54.6 PAIN IN THORACIC SPINE: ICD-10-CM

## 2025-08-21 DIAGNOSIS — M54.50 CHRONIC BILATERAL LOW BACK PAIN WITHOUT SCIATICA: ICD-10-CM

## 2025-08-21 DIAGNOSIS — G89.29 CHRONIC BILATERAL LOW BACK PAIN WITHOUT SCIATICA: ICD-10-CM

## 2025-08-21 DIAGNOSIS — B02.29 POST HERPETIC NEURALGIA: Primary | ICD-10-CM

## 2025-08-21 DIAGNOSIS — M99.01 SEGMENTAL DYSFUNCTION OF CERVICAL REGION: ICD-10-CM

## 2025-08-21 DIAGNOSIS — M79.18 MYOFASCIAL PAIN: ICD-10-CM

## 2025-08-21 DIAGNOSIS — S89.92XA INJURY OF LEFT KNEE, INITIAL ENCOUNTER: ICD-10-CM

## 2025-08-21 DIAGNOSIS — M51.360 DEGENERATION OF INTERVERTEBRAL DISC OF LUMBAR REGION WITH DISCOGENIC BACK PAIN: ICD-10-CM

## 2025-08-21 DIAGNOSIS — M47.815 SPONDYLOSIS OF THORACOLUMBAR REGION WITHOUT MYELOPATHY OR RADICULOPATHY: ICD-10-CM

## 2025-08-21 DIAGNOSIS — M99.04 SEGMENTAL DYSFUNCTION OF SACRAL REGION: ICD-10-CM

## 2025-08-21 DIAGNOSIS — M99.05 SEGMENTAL DYSFUNCTION OF PELVIC REGION: ICD-10-CM

## 2025-08-21 DIAGNOSIS — M54.2 CERVICALGIA: ICD-10-CM

## 2025-08-21 PROCEDURE — 99213 OFFICE O/P EST LOW 20 MIN: CPT | Performed by: FAMILY MEDICINE

## 2025-08-21 PROCEDURE — G2211 COMPLEX E/M VISIT ADD ON: HCPCS | Performed by: FAMILY MEDICINE

## 2025-08-21 PROCEDURE — 98941 CHIROPRACT MANJ 3-4 REGIONS: CPT | Performed by: CHIROPRACTOR

## 2025-08-21 RX ORDER — PREDNISONE 10 MG/1
TABLET ORAL
Qty: 45 TABLET | Refills: 0 | Status: SHIPPED | OUTPATIENT
Start: 2025-08-21

## (undated) DEVICE — INTENDED FOR TISSUE SEPARATION, AND OTHER PROCEDURES THAT REQUIRE A SHARP SURGICAL BLADE TO PUNCTURE OR CUT.: Brand: BARD-PARKER SAFETY BLADES SIZE 11, STERILE

## (undated) DEVICE — IRR SET 4M PG F/TUMESCENT

## (undated) DEVICE — ADHESIVE SKIN HIGH VISCOSITY EXOFIN 1ML

## (undated) DEVICE — TONGUE DEPRESSOR STERILE

## (undated) DEVICE — INTENDED FOR TISSUE SEPARATION, AND OTHER PROCEDURES THAT REQUIRE A SHARP SURGICAL BLADE TO PUNCTURE OR CUT.: Brand: BARD-PARKER SAFETY BLADES SIZE 15, STERILE

## (undated) DEVICE — NEEDLE SPINAL 20G X 3.5 LF

## (undated) DEVICE — KERLIX BANDAGE ROLL: Brand: KERLIX

## (undated) DEVICE — GLOVE PI ULTRA TOUCH SZ.7.0

## (undated) DEVICE — GLOVE SRG BIOGEL 6.5

## (undated) DEVICE — ULTRASOUND GEL STERILE FOIL PK

## (undated) DEVICE — ACE WRAP 6 IN STERILE

## (undated) DEVICE — BETHLEHEM UNIVERSAL MINOR GEN: Brand: CARDINAL HEALTH

## (undated) DEVICE — COBAN 4 IN STERILE

## (undated) DEVICE — DRAPE SHEET X-LG

## (undated) DEVICE — GLOVE INDICATOR PI UNDERGLOVE SZ 6.5 BLUE

## (undated) DEVICE — CHLORAPREP HI-LITE 26ML ORANGE

## (undated) DEVICE — COVER PROBE INTRAOPERATIVE 6 X 96 IN

## (undated) DEVICE — STOPCOCK 3-WAY

## (undated) DEVICE — ACE WRAP 4 IN STERILE

## (undated) DEVICE — GLOVE PI ULTRA TOUCH SZ.6.5

## (undated) DEVICE — SYRINGE 20ML LL

## (undated) DEVICE — FIBER PROC KIT GOLD TIP 21G 45CM NEVERTOUCH

## (undated) DEVICE — DRAPE SHEET THREE QUARTER

## (undated) DEVICE — TIBURON SPLIT SHEET: Brand: CONVERTORS

## (undated) DEVICE — 3M™ STERI-STRIP™ REINFORCED ADHESIVE SKIN CLOSURES, R1547, 1/2 IN X 4 IN (12 MM X 100 MM), 6 STRIPS/ENVELOPE: Brand: 3M™ STERI-STRIP™